# Patient Record
Sex: FEMALE | Race: WHITE | NOT HISPANIC OR LATINO | URBAN - NONMETROPOLITAN AREA
[De-identification: names, ages, dates, MRNs, and addresses within clinical notes are randomized per-mention and may not be internally consistent; named-entity substitution may affect disease eponyms.]

---

## 2017-01-11 RX ORDER — OMEPRAZOLE 20 MG/1
CAPSULE, DELAYED RELEASE ORAL
Qty: 90 CAP | Refills: 0 | Status: SHIPPED | OUTPATIENT
Start: 2017-01-11 | End: 2017-04-01 | Stop reason: SDUPTHER

## 2017-01-11 NOTE — TELEPHONE ENCOUNTER
Was the patient seen in the last year in this department? Yes     Does patient have an active prescription for medications requested? No     Received Request Via: Pharmacy

## 2017-01-18 RX ORDER — ERGOCALCIFEROL 1.25 MG/1
CAPSULE ORAL
Qty: 12 CAP | Refills: 0 | Status: SHIPPED | OUTPATIENT
Start: 2017-01-18 | End: 2017-04-12 | Stop reason: SDUPTHER

## 2017-02-13 ENCOUNTER — OFFICE VISIT (OUTPATIENT)
Dept: URGENT CARE | Facility: PHYSICIAN GROUP | Age: 57
End: 2017-02-13
Payer: COMMERCIAL

## 2017-02-13 VITALS
DIASTOLIC BLOOD PRESSURE: 88 MMHG | TEMPERATURE: 99.5 F | HEIGHT: 66 IN | RESPIRATION RATE: 16 BRPM | BODY MASS INDEX: 32.3 KG/M2 | OXYGEN SATURATION: 95 % | HEART RATE: 96 BPM | SYSTOLIC BLOOD PRESSURE: 142 MMHG | WEIGHT: 201 LBS

## 2017-02-13 DIAGNOSIS — M77.11 LATERAL EPICONDYLITIS OF RIGHT ELBOW: ICD-10-CM

## 2017-02-13 PROCEDURE — 99213 OFFICE O/P EST LOW 20 MIN: CPT | Performed by: NURSE PRACTITIONER

## 2017-02-13 RX ORDER — MELOXICAM 7.5 MG/1
7.5 TABLET ORAL DAILY
Qty: 30 TAB | Refills: 0 | Status: SHIPPED | OUTPATIENT
Start: 2017-02-13 | End: 2017-03-17 | Stop reason: SDUPTHER

## 2017-02-13 RX ORDER — HYDROCODONE BITARTRATE AND ACETAMINOPHEN 5; 325 MG/1; MG/1
1-2 TABLET ORAL EVERY 6 HOURS PRN
Qty: 20 TAB | Refills: 0 | Status: SHIPPED | OUTPATIENT
Start: 2017-02-13 | End: 2019-01-01

## 2017-02-13 ASSESSMENT — ENCOUNTER SYMPTOMS
WEAKNESS: 1
JOINT SWELLING: 1
MYALGIAS: 0
FALLS: 0
ARTHRALGIAS: 0
FEVER: 0

## 2017-02-13 NOTE — Clinical Note
February 13, 2017         Patient: Emily Torres   YOB: 1960   Date of Visit: 2/13/2017           To Whom it May Concern:    Emily Torres was seen in my clinic on 2/13/2017. Please excuse her from work for 2/13/17.        Sincerely,           CAITLYN Matta.  Electronically Signed

## 2017-02-13 NOTE — PROGRESS NOTES
Subjective:      Emily Torres is a 56 y.o. female who presents with Elbow Pain            Arm Injury  This is a new problem. Episode onset: 2 months ago. The problem occurs constantly. The problem has been unchanged. Associated symptoms include joint swelling (sujective) and weakness (right arm). Pertinent negatives include no arthralgias, fever or myalgias. The symptoms are aggravated by exertion (bending arm/ lifting objects). She has tried acetaminophen, NSAIDs and ice for the symptoms. The treatment provided no relief.       Review of Systems   Constitutional: Negative for fever and malaise/fatigue.   Musculoskeletal: Positive for joint swelling (sujective). Negative for myalgias, joint pain, falls and arthralgias.   Neurological: Positive for weakness (right arm).   All other systems reviewed and are negative.    PMH:  has a past medical history of Hypertension; GERD (gastroesophageal reflux disease); Dyslipidemia (5/15/2014); Hypothyroid (5/15/2014); Unspecified vitamin D deficiency (5/15/2014); Screening (3/17/2015); and Primary insomnia (8/24/2016).  MEDS:   Current outpatient prescriptions:   •  meloxicam (MOBIC) 7.5 MG Tab, Take 1 Tab by mouth every day., Disp: 30 Tab, Rfl: 0  •  hydrocodone-acetaminophen (NORCO) 5-325 MG Tab per tablet, Take 1-2 Tabs by mouth every 6 hours as needed., Disp: 20 Tab, Rfl: 0  •  vitamin D, Ergocalciferol, (DRISDOL) 08441 UNITS Cap capsule, TAKE 1 CAP BY MOUTH EVERY 7 DAYS., Disp: 12 Cap, Rfl: 0  •  omeprazole (PRILOSEC) 20 MG delayed-release capsule, TAKE 1 CAP BY MOUTH EVERY DAY., Disp: 90 Cap, Rfl: 0  •  lisinopril-hydrochlorothiazide (PRINZIDE, ZESTORETIC) 20-25 MG per tablet, TAKE 1 TAB BY MOUTH EVERY DAY., Disp: 90 Tab, Rfl: 3  •  vitamin D, Ergocalciferol, (DRISDOL) 00784 UNITS Cap capsule, TAKE 1 CAP BY MOUTH EVERY 7 DAYS., Disp: 12 Cap, Rfl: 0  •  zolpidem (AMBIEN) 5 MG Tab, Take 1 Tab by mouth at bedtime as needed for Sleep., Disp: 30 Tab, Rfl: 5  •   "ipratropium-albuterol (DUONEB) 0.5-2.5 (3) MG/3ML nebulizer solution, 3 mL by Nebulization route every 6 hours as needed for Shortness of Breath., Disp: 3 mL, Rfl: 3  •  fluticasone (FLOVENT HFA) 44 MCG/ACT AERO, Inhale 2 Puffs by mouth 2 times a day., Disp: 1 Inhaler, Rfl: 3  •  albuterol (VENTOLIN OR PROVENTIL) 108 (90 BASE) MCG/ACT AERS, Inhale 2 Puffs by mouth every 6 hours as needed for Shortness of Breath., Disp: 8.5 g, Rfl: 3  ALLERGIES:   Allergies   Allergen Reactions   • Shellfish Allergy      SURGHX:   Past Surgical History   Procedure Laterality Date   • Hemorrhoidectomy       SOCHX:  reports that she quit smoking about 5 years ago. Her smoking use included Cigarettes. She has a 5 pack-year smoking history. She has never used smokeless tobacco. She reports that she drinks about 0.5 oz of alcohol per week.  FH: family history includes Hypertension in her father and mother.       Objective:     /88 mmHg  Pulse 96  Temp(Src) 37.5 °C (99.5 °F)  Resp 16  Ht 1.676 m (5' 6\")  Wt 91.173 kg (201 lb)  BMI 32.46 kg/m2  SpO2 95%  Breastfeeding? No     Physical Exam   Constitutional: She is oriented to person, place, and time. Vital signs are normal. She appears well-developed and well-nourished.   HENT:   Head: Normocephalic.   Eyes: EOM are normal. Pupils are equal, round, and reactive to light.   Neck: Normal range of motion.   Cardiovascular: Normal rate and regular rhythm.    Pulmonary/Chest: Effort normal.   Musculoskeletal: She exhibits tenderness (lateral epicondyle right arm).        Right forearm: She exhibits tenderness. She exhibits no swelling, no edema and no deformity.        Arms:  Neurological: She is alert and oriented to person, place, and time. She has normal reflexes.   Skin: Skin is warm and dry.   Psychiatric: She has a normal mood and affect. Her behavior is normal. Thought content normal.   Vitals reviewed.              Assessment/Plan:     1. Lateral epicondylitis of right " elbow  - meloxicam (MOBIC) 7.5 MG Tab; Take 1 Tab by mouth every day.  Dispense: 30 Tab; Refill: 0  - hydrocodone-acetaminophen (NORCO) 5-325 MG Tab per tablet; Take 1-2 Tabs by mouth every 6 hours as needed.  Dispense: 20 Tab; Refill: 0  - RICE  - Force brace  - Physical therapy exercises  Follow up PRN if s/s do not improve or worsen

## 2017-02-13 NOTE — MR AVS SNAPSHOT
"        Emily Utter   2017 10:05 AM   Office Visit   MRN: [id number]    Department:  [facility]   Dept Phone:  [phone]    Description:  Female : 1960   Provider:  IRMA Matta           Reason for Visit     Elbow Pain irritated it X 2 months, getting worse, cannot lift any weight with that arm due to pain      Allergies as of 2017     Allergen Noted Reactions    Shellfish Allergy 05/15/2014         You were diagnosed with     Lateral epicondylitis of right elbow   [997074]         Vital Signs     Blood Pressure Pulse Temperature Respirations Height Weight    142/88 mmHg 96 37.5 °C (99.5 °F) 16 1.676 m (5' 6\") 91.173 kg (201 lb)    Body Mass Index Oxygen Saturation Breastfeeding? Smoking Status          32.46 kg/m2 95% No Former Smoker        Basic Information     Date Of Birth Sex Race Ethnicity Preferred Language    1960 Female White Non- English      Your appointments     Mar 10, 2017  8:00 AM   Adult Draw/Collection with [facility]   [facility] (--)    [address]  [place] [address]   [phone]            Mar 17, 2017 11:00 AM   Established Patient with Jeanine ALAN M.D.   [facility] (--)    [address]  [place] [id number] [phone]           You will be receiving a confirmation call a few days before your appointment from our automated call confirmation system.              Problem List              ICD-10-CM Priority Class Noted - Resolved    HTN (hypertension) I10 Medium Chronic 2013 - Present    GERD (gastroesophageal reflux disease) K21.9 Medium Chronic 2013 - Present    Dyslipidemia E78.5   5/15/2014 - Present    Hypothyroid E03.9   5/15/2014 - Present    Vitamin D deficiency E55.9   5/15/2014 - Present    Screening Z13.9   3/17/2015 - Present    Obesity E66.9   3/17/2015 - Present    Primary insomnia F51.01   2016 - Present    Mild intermittent asthma without complication " J45.20   8/24/2016 - Present      Health Maintenance        Date Due Completion Dates    IMM DTaP/Tdap/Td Vaccine (1 - Tdap) 10/21/1981 ---    IMM PNEUMOCOCCAL 19-64 (ADULT) MEDIUM RISK SERIES (1 of 1 - PPSV23) 10/21/1981 ---    COLONOSCOPY 10/21/2012 ---    PAP SMEAR 5/15/2013 5/15/2010 (Prv Comp)    Override on 5/15/2010: Previously completed    MAMMOGRAM 10/15/2015 10/15/2014    IMM INFLUENZA (1) 9/1/2016 ---            Current Immunizations     No immunizations on file.      Below and/or attached are the medications your provider expects you to take. Review all of your home medications and newly ordered medications with your provider and/or pharmacist. Follow medication instructions as directed by your provider and/or pharmacist. Please keep your medication list with you and share with your provider. Update the information when medications are discontinued, doses are changed, or new medications (including over-the-counter products) are added; and carry medication information at all times in the event of emergency situations     Allergies:  SHELLFISH ALLERGY - (reactions not documented)               Medications  Valid as of: February 13, 2017 - 11:48 AM    Generic Name Brand Name Tablet Size Instructions for use    Albuterol Sulfate (Aero Soln) albuterol 108 (90 BASE) MCG/ACT Inhale 2 Puffs by mouth every 6 hours as needed for Shortness of Breath.        Ergocalciferol (Cap) DRISDOL 97500 UNITS TAKE 1 CAP BY MOUTH EVERY 7 DAYS.        Ergocalciferol (Cap) DRISDOL 48379 UNITS TAKE 1 CAP BY MOUTH EVERY 7 DAYS.        Fluticasone Propionate HFA (Aerosol) FLOVENT HFA 44 MCG/ACT Inhale 2 Puffs by mouth 2 times a day.        Hydrocodone-Acetaminophen (Tab) NORCO 5-325 MG Take 1-2 Tabs by mouth every 6 hours as needed.        Ipratropium-Albuterol (Solution) DUONEB 0.5-2.5 (3) MG/3ML 3 mL by Nebulization route every 6 hours as needed for Shortness of Breath.        Lisinopril-Hydrochlorothiazide (Tab) PRINZIDE,  ZESTORETIC 20-25 MG TAKE 1 TAB BY MOUTH EVERY DAY.        Meloxicam (Tab) MOBIC 7.5 MG Take 1 Tab by mouth every day.        Omeprazole (CAPSULE DELAYED RELEASE) PRILOSEC 20 MG TAKE 1 CAP BY MOUTH EVERY DAY.        Zolpidem Tartrate (Tab) AMBIEN 5 MG Take 1 Tab by mouth at bedtime as needed for Sleep.        .                 Medicines prescribed today were sent to:     [id number] - [place], [place] - [address]    [address] [place] [address]    Phone: [phone] Fax: [phone]    Open 24 Hours?: No      Medication refill instructions:       If your prescription bottle indicates you have medication refills left, it is not necessary to call your provider’s office. Please contact your pharmacy and they will refill your medication.    If your prescription bottle indicates you do not have any refills left, you may request refills at any time through one of the following ways: The online [email or web address] system (except Urgent Care), by calling your provider’s office, or by asking your pharmacy to contact your provider’s office with a refill request. Medication refills are processed only during regular business hours and may not be available until the next business day. Your provider may request additional information or to have a follow-up visit with you prior to refilling your medication.   *Please Note: Medication refills are assigned a new Rx number when refilled electronically. Your pharmacy may indicate that no refills were authorized even though a new prescription for the same medication is available at the pharmacy. Please request the medicine by name with the pharmacy before contacting your provider for a refill.           [email or web address] Access Code: Activation code not generated  Current [email or web address] Status: Active

## 2017-03-10 ENCOUNTER — HOSPITAL ENCOUNTER (OUTPATIENT)
Dept: LAB | Facility: MEDICAL CENTER | Age: 57
End: 2017-03-10
Attending: INTERNAL MEDICINE
Payer: COMMERCIAL

## 2017-03-10 DIAGNOSIS — E03.9 HYPOTHYROID: ICD-10-CM

## 2017-03-10 DIAGNOSIS — I10 HTN (HYPERTENSION): ICD-10-CM

## 2017-03-10 DIAGNOSIS — E78.5 DYSLIPIDEMIA: ICD-10-CM

## 2017-03-10 DIAGNOSIS — E55.9 VITAMIN D DEFICIENCY: ICD-10-CM

## 2017-03-10 LAB
25(OH)D3 SERPL-MCNC: 52 NG/ML (ref 30–100)
ALBUMIN SERPL BCP-MCNC: 4.2 G/DL (ref 3.2–4.9)
ALBUMIN/GLOB SERPL: 1.4 G/DL
ALP SERPL-CCNC: 53 U/L (ref 30–99)
ALT SERPL-CCNC: 42 U/L (ref 2–50)
ANION GAP SERPL CALC-SCNC: 11 MMOL/L (ref 0–11.9)
AST SERPL-CCNC: 48 U/L (ref 12–45)
BASOPHILS # BLD AUTO: 0.05 K/UL (ref 0–0.12)
BASOPHILS NFR BLD AUTO: 1.4 % (ref 0–1.8)
BILIRUB SERPL-MCNC: 0.6 MG/DL (ref 0.1–1.5)
BUN SERPL-MCNC: 21 MG/DL (ref 8–22)
CALCIUM SERPL-MCNC: 9.9 MG/DL (ref 8.5–10.5)
CHLORIDE SERPL-SCNC: 103 MMOL/L (ref 96–112)
CHOLEST SERPL-MCNC: 234 MG/DL (ref 100–199)
CO2 SERPL-SCNC: 26 MMOL/L (ref 20–33)
CREAT SERPL-MCNC: 0.83 MG/DL (ref 0.5–1.4)
EOSINOPHIL # BLD: 0.03 K/UL (ref 0–0.51)
EOSINOPHIL NFR BLD AUTO: 0.8 % (ref 0–6.9)
ERYTHROCYTE [DISTWIDTH] IN BLOOD BY AUTOMATED COUNT: 42 FL (ref 35.9–50)
GLOBULIN SER CALC-MCNC: 3 G/DL (ref 1.9–3.5)
GLUCOSE SERPL-MCNC: 89 MG/DL (ref 65–99)
HCT VFR BLD AUTO: 40.3 % (ref 37–47)
HDLC SERPL-MCNC: 54 MG/DL
HGB BLD-MCNC: 13.1 G/DL (ref 12–16)
IMM GRANULOCYTES # BLD AUTO: 0.01 K/UL (ref 0–0.11)
IMM GRANULOCYTES NFR BLD AUTO: 0.3 % (ref 0–0.9)
LDLC SERPL CALC-MCNC: 108 MG/DL
LYMPHOCYTES # BLD: 1.53 K/UL (ref 1–4.8)
LYMPHOCYTES NFR BLD AUTO: 43.3 % (ref 22–41)
MCH RBC QN AUTO: 32.5 PG (ref 27–33)
MCHC RBC AUTO-ENTMCNC: 32.5 G/DL (ref 33.6–35)
MCV RBC AUTO: 100 FL (ref 81.4–97.8)
MONOCYTES # BLD: 0.26 K/UL (ref 0–0.85)
MONOCYTES NFR BLD AUTO: 7.4 % (ref 0–13.4)
NEUTROPHILS # BLD: 1.65 K/UL (ref 2–7.15)
NEUTROPHILS NFR BLD AUTO: 46.8 % (ref 44–72)
NRBC # BLD AUTO: 0 K/UL
NRBC BLD-RTO: 0 /100 WBC
PLATELET # BLD AUTO: 253 K/UL (ref 164–446)
PMV BLD AUTO: 9.2 FL (ref 9–12.9)
POTASSIUM SERPL-SCNC: 3.7 MMOL/L (ref 3.6–5.5)
PROT SERPL-MCNC: 7.2 G/DL (ref 6–8.2)
RBC # BLD AUTO: 4.03 M/UL (ref 4.2–5.4)
SODIUM SERPL-SCNC: 140 MMOL/L (ref 135–145)
T4 FREE SERPL-MCNC: 0.59 NG/DL (ref 0.53–1.43)
TRIGL SERPL-MCNC: 359 MG/DL (ref 0–149)
TSH SERPL DL<=0.005 MIU/L-ACNC: 4.08 UIU/ML (ref 0.3–3.7)
WBC # BLD AUTO: 3.5 K/UL (ref 4.8–10.8)

## 2017-03-10 PROCEDURE — 84439 ASSAY OF FREE THYROXINE: CPT

## 2017-03-10 PROCEDURE — 80061 LIPID PANEL: CPT

## 2017-03-10 PROCEDURE — 84443 ASSAY THYROID STIM HORMONE: CPT

## 2017-03-10 PROCEDURE — 85025 COMPLETE CBC W/AUTO DIFF WBC: CPT

## 2017-03-10 PROCEDURE — 80053 COMPREHEN METABOLIC PANEL: CPT

## 2017-03-10 PROCEDURE — 82306 VITAMIN D 25 HYDROXY: CPT

## 2017-03-10 PROCEDURE — 36415 COLL VENOUS BLD VENIPUNCTURE: CPT

## 2017-03-17 ENCOUNTER — OFFICE VISIT (OUTPATIENT)
Dept: MEDICAL GROUP | Facility: PHYSICIAN GROUP | Age: 57
End: 2017-03-17
Payer: COMMERCIAL

## 2017-03-17 VITALS
OXYGEN SATURATION: 97 % | RESPIRATION RATE: 16 BRPM | SYSTOLIC BLOOD PRESSURE: 144 MMHG | TEMPERATURE: 98.6 F | WEIGHT: 201 LBS | DIASTOLIC BLOOD PRESSURE: 100 MMHG | HEART RATE: 76 BPM | BODY MASS INDEX: 32.46 KG/M2

## 2017-03-17 DIAGNOSIS — E78.5 DYSLIPIDEMIA: ICD-10-CM

## 2017-03-17 DIAGNOSIS — E66.09 OBESITY DUE TO EXCESS CALORIES, UNSPECIFIED OBESITY SEVERITY: ICD-10-CM

## 2017-03-17 DIAGNOSIS — M77.11 LATERAL EPICONDYLITIS OF RIGHT ELBOW: ICD-10-CM

## 2017-03-17 DIAGNOSIS — I10 ESSENTIAL HYPERTENSION: ICD-10-CM

## 2017-03-17 DIAGNOSIS — E03.9 HYPOTHYROIDISM, UNSPECIFIED TYPE: ICD-10-CM

## 2017-03-17 PROCEDURE — 99214 OFFICE O/P EST MOD 30 MIN: CPT | Performed by: INTERNAL MEDICINE

## 2017-03-17 RX ORDER — MELOXICAM 7.5 MG/1
7.5 TABLET ORAL DAILY
Qty: 30 TAB | Refills: 3 | Status: SHIPPED | OUTPATIENT
Start: 2017-03-17 | End: 2019-01-01

## 2017-03-17 RX ORDER — LEVOTHYROXINE SODIUM 0.03 MG/1
25 TABLET ORAL
Qty: 60 TAB | Refills: 3 | Status: SHIPPED | OUTPATIENT
Start: 2017-03-17 | End: 2017-11-20 | Stop reason: SDUPTHER

## 2017-03-17 NOTE — MR AVS SNAPSHOT
Emily Torres   3/17/2017 11:00 AM   Office Visit   MRN: [id number]    Department:  [facility]   Dept Phone:  [phone]    Description:  Female : 1960   Provider:  Jeanine ALAN M.D.           Reason for Visit     Other fv labs      Allergies as of 3/17/2017     Allergen Noted Reactions    Shellfish Allergy 05/15/2014         You were diagnosed with     Essential hypertension   [6208773]       Lateral epicondylitis of right elbow   [168538]       Hypothyroidism, unspecified type   [6906614]       Dyslipidemia   [477607]       Obesity due to excess calories, unspecified obesity severity   [2669065]         Vital Signs     Blood Pressure Pulse Temperature Respirations Weight Oxygen Saturation    144/100 mmHg 76 37 °C (98.6 °F) 16 91.173 kg (201 lb) 97%    Smoking Status                   Former Smoker           Basic Information     Date Of Birth Sex Race Ethnicity Preferred Language    1960 Female White Non- English      Your appointments     2017  3:20 PM   Established Patient with Jeanine ALAN M.D.   [facility] (--)    [address] [id number] [phone]           You will be receiving a confirmation call a few days before your appointment from our automated call confirmation system.              Problem List              ICD-10-CM Priority Class Noted - Resolved    HTN (hypertension) I10 Medium Chronic 2013 - Present    GERD (gastroesophageal reflux disease) K21.9 Medium Chronic 2013 - Present    Dyslipidemia E78.5   5/15/2014 - Present    Hypothyroid E03.9   5/15/2014 - Present    Vitamin D deficiency E55.9   5/15/2014 - Present    Screening Z13.9   3/17/2015 - Present    Obesity E66.9   3/17/2015 - Present    Primary insomnia F51.01   2016 - Present    Mild intermittent asthma without complication J45.20   2016 - Present    Lateral epicondylitis of right elbow M77.11   3/17/2017 - Present         Health Maintenance        Date Due Completion Dates    IMM DTaP/Tdap/Td Vaccine (1 - Tdap) 10/21/1981 ---    IMM PNEUMOCOCCAL 19-64 (ADULT) MEDIUM RISK SERIES (1 of 1 - PPSV23) 10/21/1981 ---    COLONOSCOPY 10/21/2012 ---    PAP SMEAR 5/15/2013 5/15/2010 (Prv Comp)    Override on 5/15/2010: Previously completed    MAMMOGRAM 10/15/2015 10/15/2014    IMM INFLUENZA (1) 9/1/2016 ---            Current Immunizations     No immunizations on file.      Below and/or attached are the medications your provider expects you to take. Review all of your home medications and newly ordered medications with your provider and/or pharmacist. Follow medication instructions as directed by your provider and/or pharmacist. Please keep your medication list with you and share with your provider. Update the information when medications are discontinued, doses are changed, or new medications (including over-the-counter products) are added; and carry medication information at all times in the event of emergency situations     Allergies:  SHELLFISH ALLERGY - (reactions not documented)               Medications  Valid as of: March 17, 2017 - 11:37 AM    Generic Name Brand Name Tablet Size Instructions for use    Albuterol Sulfate (Aero Soln) albuterol 108 (90 BASE) MCG/ACT Inhale 2 Puffs by mouth every 6 hours as needed for Shortness of Breath.        Ergocalciferol (Cap) DRISDOL 04321 UNITS TAKE 1 CAP BY MOUTH EVERY 7 DAYS.        Ergocalciferol (Cap) DRISDOL 71810 UNITS TAKE 1 CAP BY MOUTH EVERY 7 DAYS.        Fluticasone Propionate HFA (Aerosol) FLOVENT HFA 44 MCG/ACT Inhale 2 Puffs by mouth 2 times a day.        Hydrocodone-Acetaminophen (Tab) NORCO 5-325 MG Take 1-2 Tabs by mouth every 6 hours as needed.        Ipratropium-Albuterol (Solution) DUONEB 0.5-2.5 (3) MG/3ML 3 mL by Nebulization route every 6 hours as needed for Shortness of Breath.        Levothyroxine Sodium (Tab) SYNTHROID 25 MCG Take 1 Tab by mouth Every morning on an empty  stomach.        Lisinopril-Hydrochlorothiazide (Tab) PRINZIDE, ZESTORETIC 20-25 MG TAKE 1 TAB BY MOUTH EVERY DAY.        Meloxicam (Tab) MOBIC 7.5 MG Take 1 Tab by mouth every day.        Omeprazole (CAPSULE DELAYED RELEASE) PRILOSEC 20 MG TAKE 1 CAP BY MOUTH EVERY DAY.        Zolpidem Tartrate (Tab) AMBIEN 5 MG Take 1 Tab by mouth at bedtime as needed for Sleep.        .                 Medicines prescribed today were sent to:     [facility] #[id number] - [place] - [address]    [address] [place] [address]    Phone: [phone] Fax: [phone]    Open 24 Hours?: No      Medication refill instructions:       If your prescription bottle indicates you have medication refills left, it is not necessary to call your provider’s office. Please contact your pharmacy and they will refill your medication.    If your prescription bottle indicates you do not have any refills left, you may request refills at any time through one of the following ways: The online [employer] system (except Urgent Care), by calling your provider’s office, or by asking your pharmacy to contact your provider’s office with a refill request. Medication refills are processed only during regular business hours and may not be available until the next business day. Your provider may request additional information or to have a follow-up visit with you prior to refilling your medication.   *Please Note: Medication refills are assigned a new Rx number when refilled electronically. Your pharmacy may indicate that no refills were authorized even though a new prescription for the same medication is available at the pharmacy. Please request the medicine by name with the pharmacy before contacting your provider for a refill.        Your To Do List     Future Labs/Procedures Complete By Expires    TSH  As directed 3/17/2018      Instructions      Goal bp at 135/85 most of the time.            [employer] Access Code: Activation code not generated  Current  MyChart Status: Active

## 2017-03-17 NOTE — ASSESSMENT & PLAN NOTE
Patient reports that her elbow is a bit better she has just completed her course of meloxicam and it seems to have helped. She is being very careful about how she uses her arm.

## 2017-03-17 NOTE — PROGRESS NOTES
Chief Complaint   Patient presents with   • Other     fv labs       HISTORY OF PRESENT ILLNESS: Patient is a 56 y.o. female established patient who presents today to discuss the medical issues below.    HTN (hypertension)  Home readings at the pharmacy usually in the 130-130/70 range.  No chest pain palpitations edema    Lateral epicondylitis of right elbow  Patient reports that her elbow is a bit better she has just completed her course of meloxicam and it seems to have helped. She is being very careful about how she uses her arm. No numbness or weakness pain at the lateral elbow worse with supination.    Hypothyroid  Patient is not on replacement and never has been. She did have lab work done. She is starting a new diet today otherwise weight has been pretty steady. No constipation or diarrhea. No hair or skin changes    Dyslipidemia  Patient working on diet, she has lowered the cholesterol weight is status quo.      Obesity  Working on diet, new weight watchers program came today.        Patient Active Problem List    Diagnosis Date Noted   • HTN (hypertension) 06/20/2013     Priority: Medium     Class: Chronic   • GERD (gastroesophageal reflux disease) 06/20/2013     Priority: Medium     Class: Chronic   • Lateral epicondylitis of right elbow 03/17/2017   • Primary insomnia 08/24/2016   • Mild intermittent asthma without complication 08/24/2016   • Screening 03/17/2015   • Obesity 03/17/2015   • Dyslipidemia 05/15/2014   • Hypothyroid 05/15/2014   • Vitamin D deficiency 05/15/2014       Allergies:Shellfish allergy    Current Outpatient Prescriptions   Medication Sig Dispense Refill   • levothyroxine (SYNTHROID) 25 MCG Tab Take 1 Tab by mouth Every morning on an empty stomach. 60 Tab 3   • meloxicam (MOBIC) 7.5 MG Tab Take 1 Tab by mouth every day. 30 Tab 3   • vitamin D, Ergocalciferol, (DRISDOL) 20726 UNITS Cap capsule TAKE 1 CAP BY MOUTH EVERY 7 DAYS. 12 Cap 0   • omeprazole (PRILOSEC) 20 MG delayed-release  capsule TAKE 1 CAP BY MOUTH EVERY DAY. 90 Cap 0   • lisinopril-hydrochlorothiazide (PRINZIDE, ZESTORETIC) 20-25 MG per tablet TAKE 1 TAB BY MOUTH EVERY DAY. 90 Tab 3   • albuterol (VENTOLIN OR PROVENTIL) 108 (90 BASE) MCG/ACT AERS Inhale 2 Puffs by mouth every 6 hours as needed for Shortness of Breath. 8.5 g 3   • hydrocodone-acetaminophen (NORCO) 5-325 MG Tab per tablet Take 1-2 Tabs by mouth every 6 hours as needed. 20 Tab 0   • vitamin D, Ergocalciferol, (DRISDOL) 78809 UNITS Cap capsule TAKE 1 CAP BY MOUTH EVERY 7 DAYS. 12 Cap 0   • zolpidem (AMBIEN) 5 MG Tab Take 1 Tab by mouth at bedtime as needed for Sleep. 30 Tab 5   • ipratropium-albuterol (DUONEB) 0.5-2.5 (3) MG/3ML nebulizer solution 3 mL by Nebulization route every 6 hours as needed for Shortness of Breath. 3 mL 3   • fluticasone (FLOVENT HFA) 44 MCG/ACT AERO Inhale 2 Puffs by mouth 2 times a day. 1 Inhaler 3     No current facility-administered medications for this visit.         Past Medical History   Diagnosis Date   • Hypertension    • GERD (gastroesophageal reflux disease)    • Dyslipidemia 5/15/2014     Recently started working out with  - 3 weeks ago ,  Will try lifestyle x 3-4 months    • Hypothyroid 5/15/2014     TSH 3.82 - repeat in Sept   • Unspecified vitamin D deficiency 5/15/2014     5 uncorrected. Start 50,000IU daily.    • Screening 3/17/2015   • Primary insomnia 8/24/2016   • Lateral epicondylitis of right elbow 3/17/2017       Social History   Substance Use Topics   • Smoking status: Former Smoker -- 1.00 packs/day for 5 years     Types: Cigarettes     Quit date: 05/15/2011   • Smokeless tobacco: Never Used   • Alcohol Use: 0.5 oz/week     1 Glasses of wine per week      Comment: twice weekly       Family Status   Relation Status Death Age   • Mother Alive    • Father Alive    • Sister Alive      Family History   Problem Relation Age of Onset   • Hypertension Mother    • Hypertension Father        ROS:     Respiratory: Negative for cough, sputum production, shortness of breath or wheezing.    Cardiovascular: Negative for chest pain, palpitations, orthopnea, dyspnea with exertion or edema.   Gastrointestinal: Negative for GI upset, nausea, vomiting, abdominal pain, constipation or diarrhea.   Genitourinary: Negative for dysuria, urgency, hesitancy or frequency.       Exam:    Blood pressure 144/100, pulse 76, temperature 37 °C (98.6 °F), resp. rate 16, weight 91.173 kg (201 lb), SpO2 97 %.  General:  Well nourished, well developed female in NAD.  HENT: Normocephalic, bilateral TMs are intact, nasal and oral mucosa with no lesions,   Neck: Supple without bruit. Thyroid is not enlarged.  Pulmonary: Clear to ausculation and percussion.  Normal effort. No rales, rhonchi, or wheezing.  Cardiovascular: Regular rate and rhythm without murmur.   Abdomen: Normal bowel sounds soft and nontender no palpable liver spleen bladder mass.  Extremities: No LE edema noted.  Neuro: Grossly nonfocal.  Psych: Alert and oriented to person, place, and time. Appropriate mood and conversation.    LABS:  Results reviewed and discussed with the patient, questions answered.      This dictation was created using voice recognition software. I have made reasonable attempts to correct errors, however, errors of grammar and content may exist.          Assessment/Plan:    1. Essential hypertension  Borderline discussed home monitoring. She has had good control generally has been doing a lot of chores this morning. We discussed goal, consider medication adjustment if persisting. Monitor with weight loss program. No secondary symptoms.    2. Lateral epicondylitis of right elbow  Refill on the meloxicam. She is utilizing a brace gradually improving, discussed consideration for injection if persisting.  - meloxicam (MOBIC) 7.5 MG Tab; Take 1 Tab by mouth every day.  Dispense: 30 Tab; Refill: 3    3. Hypothyroidism, unspecified type  Borderline TSH  discussed options start low-dose thyroid replacement monitor  - TSH; Future    4. Dyslipidemia  Monitor with adjust to normalize the TSH and diet exercise weight loss program    5. Obesity due to excess calories, unspecified obesity severity  Reviewed weight loss diet programs, behavior modification. Support    Patient was seen for 30 minutes face to face of which more than 50% of the time was spent in counseling and coordination of care regarding the above problems.

## 2017-03-17 NOTE — ASSESSMENT & PLAN NOTE
Patient is not on replacement and never has been she did have lab work done. She is starting a new diet today otherwise weight has been pretty steady. No constipation or diarrhea.

## 2017-04-02 RX ORDER — OMEPRAZOLE 20 MG/1
CAPSULE, DELAYED RELEASE ORAL
Qty: 90 CAP | Refills: 0 | Status: SHIPPED | OUTPATIENT
Start: 2017-04-02 | End: 2017-05-01 | Stop reason: SDUPTHER

## 2017-04-13 RX ORDER — ERGOCALCIFEROL 1.25 MG/1
CAPSULE ORAL
Qty: 12 CAP | Refills: 1 | Status: SHIPPED | OUTPATIENT
Start: 2017-04-13 | End: 2017-10-22 | Stop reason: SDUPTHER

## 2017-08-03 ENCOUNTER — OFFICE VISIT (OUTPATIENT)
Dept: MEDICAL GROUP | Facility: PHYSICIAN GROUP | Age: 57
End: 2017-08-03
Payer: COMMERCIAL

## 2017-08-03 VITALS
DIASTOLIC BLOOD PRESSURE: 76 MMHG | BODY MASS INDEX: 31.82 KG/M2 | OXYGEN SATURATION: 97 % | HEART RATE: 93 BPM | SYSTOLIC BLOOD PRESSURE: 110 MMHG | WEIGHT: 198 LBS | HEIGHT: 66 IN | TEMPERATURE: 98.1 F | RESPIRATION RATE: 16 BRPM

## 2017-08-03 DIAGNOSIS — R79.89 ELEVATED TSH: ICD-10-CM

## 2017-08-03 DIAGNOSIS — B07.0 PLANTAR WART: ICD-10-CM

## 2017-08-03 DIAGNOSIS — E66.09 OBESITY DUE TO EXCESS CALORIES, UNSPECIFIED OBESITY SEVERITY: ICD-10-CM

## 2017-08-03 PROCEDURE — 17110 DESTRUCTION B9 LES UP TO 14: CPT | Performed by: INTERNAL MEDICINE

## 2017-08-03 PROCEDURE — 99212 OFFICE O/P EST SF 10 MIN: CPT | Mod: 25 | Performed by: INTERNAL MEDICINE

## 2017-08-03 ASSESSMENT — PATIENT HEALTH QUESTIONNAIRE - PHQ9: CLINICAL INTERPRETATION OF PHQ2 SCORE: 0

## 2017-08-03 ASSESSMENT — PAIN SCALES - GENERAL: PAINLEVEL: 9=SEVERE PAIN

## 2017-08-03 NOTE — MR AVS SNAPSHOT
"        Emily Utter   8/3/2017 4:00 PM   Office Visit   MRN: [id number]    Department:  [facility]   Dept Phone:  [phone]    Description:  Female : 1960   Provider:  Jeanine ALAN M.D.           Reason for Visit     Warts wart on right foot painful/hard x2 months     Immunizations TDAP     Other Mammo/FIT test       Allergies as of 8/3/2017     Allergen Noted Reactions    Shellfish Allergy 05/15/2014         You were diagnosed with     Obesity due to excess calories, unspecified obesity severity   [6214731]       Plantar wart   [078.12.ICD-9-CM]       Elevated TSH   [276633]         Vital Signs     Blood Pressure Pulse Temperature Respirations Height Weight    110/76 mmHg 93 36.7 °C (98.1 °F) 16 1.676 m (5' 6\") 89.812 kg (198 lb)    Body Mass Index Oxygen Saturation Smoking Status             31.97 kg/m2 97% Former Smoker         Basic Information     Date Of Birth Sex Race Ethnicity Preferred Language    1960 Female White Non- English      Your appointments     Oct 06, 2017  7:30 AM   Adult Draw/Collection with [facility]   [facility] (--)    [address] [facility] [address]   [phone]            Oct 11, 2017  1:30 PM   Established Patient with Jeanine ALAN M.D.   [facility] (--)    [address] [employer] [place] [address] [phone]           You will be receiving a confirmation call a few days before your appointment from our automated call confirmation system.              Problem List              ICD-10-CM Priority Class Noted - Resolved    HTN (hypertension) I10 Medium Chronic 2013 - Present    GERD (gastroesophageal reflux disease) K21.9 Medium Chronic 2013 - Present    Dyslipidemia E78.5   5/15/2014 - Present    Hypothyroid E03.9   5/15/2014 - Present    Vitamin D deficiency E55.9   5/15/2014 - Present    Screening [id number]   3/17/2015 - Present    Obesity E66.9   3/17/2015 - Present    Primary insomnia F51.01   " 8/24/2016 - Present    Mild intermittent asthma without complication J45.20   8/24/2016 - Present    Lateral epicondylitis of right elbow M77.11   3/17/2017 - Present    Plantar wart B07.0   8/3/2017 - Present    Elevated TSH R94.6   8/3/2017 - Present      Health Maintenance        Date Due Completion Dates    IMM DTaP/Tdap/Td Vaccine (1 - Tdap) 10/21/1981 ---    COLONOSCOPY 10/21/2012 ---    PAP SMEAR 5/15/2013 5/15/2010 (Prv Comp)    Override on 5/15/2010: Previously completed    MAMMOGRAM 10/15/2015 10/15/2014    IMM INFLUENZA (1) 9/1/2017 ---            Current Immunizations     Pneumococcal polysaccharide vaccine (PPSV-23) 11/10/2011      Below and/or attached are the medications your provider expects you to take. Review all of your home medications and newly ordered medications with your provider and/or pharmacist. Follow medication instructions as directed by your provider and/or pharmacist. Please keep your medication list with you and share with your provider. Update the information when medications are discontinued, doses are changed, or new medications (including over-the-counter products) are added; and carry medication information at all times in the event of emergency situations     Allergies:  SHELLFISH ALLERGY - (reactions not documented)               Medications  Valid as of: August 03, 2017 -  4:43 PM    Generic Name Brand Name Tablet Size Instructions for use    Albuterol Sulfate (Aero Soln) albuterol 108 (90 BASE) MCG/ACT Inhale 2 Puffs by mouth every 6 hours as needed for Shortness of Breath.        Ergocalciferol (Cap) DRISDOL 99774 UNITS TAKE 1 CAP BY MOUTH EVERY 7 DAYS.        Ergocalciferol (Cap) DRISDOL 18591 UNITS TAKE 1 CAP BY MOUTH EVERY 7 DAYS.        Fluticasone Propionate HFA (Aerosol) FLOVENT HFA 44 MCG/ACT Inhale 2 Puffs by mouth 2 times a day.        Hydrocodone-Acetaminophen (Tab) NORCO 5-325 MG Take 1-2 Tabs by mouth every 6 hours as needed.        Ipratropium-Albuterol  (Solution) DUONEB 0.5-2.5 (3) MG/3ML 3 mL by Nebulization route every 6 hours as needed for Shortness of Breath.        Levothyroxine Sodium (Tab) SYNTHROID 25 MCG Take 1 Tab by mouth Every morning on an empty stomach.        Lisinopril-Hydrochlorothiazide (Tab) PRINZIDE, ZESTORETIC 20-25 MG TAKE 1 TAB BY MOUTH EVERY DAY.        Meloxicam (Tab) MOBIC 7.5 MG Take 1 Tab by mouth every day.        Omeprazole (CAPSULE DELAYED RELEASE) PRILOSEC 20 MG TAKE 1 CAP BY MOUTH EVERY DAY.        Zolpidem Tartrate (Tab) AMBIEN 5 MG Take 1 Tab by mouth at bedtime as needed for Sleep.        .                 Medicines prescribed today were sent to:     [facility][id number] - [place] - [address]    [address]  [place] [address]    Phone: [phone] Fax: [phone]    Open 24 Hours?: No      Medication refill instructions:       If your prescription bottle indicates you have medication refills left, it is not necessary to call your provider’s office. Please contact your pharmacy and they will refill your medication.    If your prescription bottle indicates you do not have any refills left, you may request refills at any time through one of the following ways: The online [employer] system (except Urgent Care), by calling your provider’s office, or by asking your pharmacy to contact your provider’s office with a refill request. Medication refills are processed only during regular business hours and may not be available until the next business day. Your provider may request additional information or to have a follow-up visit with you prior to refilling your medication.   *Please Note: Medication refills are assigned a new Rx number when refilled electronically. Your pharmacy may indicate that no refills were authorized even though a new prescription for the same medication is available at the pharmacy. Please request the medicine by name with the pharmacy before contacting your provider for a refill.           [employer]  Access Code: Activation code not generated  Current MyChart Status: Active

## 2017-08-03 NOTE — PROGRESS NOTES
Chief Complaint   Patient presents with   • Warts     wart on right foot painful/hard x2 months    • Immunizations     TDAP    • Other     Mammo/FIT test        HISTORY OF PRESENT ILLNESS: Patient is a 56 y.o. female established patient who presents today to discuss below issues      Obesity  Patient states she is working on weight loss, she states she had a big lunch.      Plantar wart  Patient reports she noticed a sore spot on the bottom of her right foot times about 2 months now this is just gradually increasing in tenderness. She did trip on the stairs about a week ago sprained her ankle and it was more awkward to be able to not walk on that spot.    Hypothyroid  Patient did have borderline TSH in the past has been taking 25 µg of thyroid feels a bit better. Has not had lab work done.    Patient Active Problem List    Diagnosis Date Noted   • HTN (hypertension) 06/20/2013     Priority: Medium     Class: Chronic   • GERD (gastroesophageal reflux disease) 06/20/2013     Priority: Medium     Class: Chronic   • Plantar wart 08/03/2017   • Lateral epicondylitis of right elbow 03/17/2017   • Primary insomnia 08/24/2016   • Mild intermittent asthma without complication 08/24/2016   • Screening 03/17/2015   • Obesity 03/17/2015   • Dyslipidemia 05/15/2014   • Hypothyroid 05/15/2014   • Vitamin D deficiency 05/15/2014      Allergies:Shellfish allergy    Current Outpatient Prescriptions   Medication Sig Dispense Refill   • omeprazole (PRILOSEC) 20 MG delayed-release capsule TAKE 1 CAP BY MOUTH EVERY DAY. 90 Cap 3   • levothyroxine (SYNTHROID) 25 MCG Tab Take 1 Tab by mouth Every morning on an empty stomach. 60 Tab 3   • vitamin D, Ergocalciferol, (DRISDOL) 24625 UNITS Cap capsule TAKE 1 CAP BY MOUTH EVERY 7 DAYS. 12 Cap 0   • lisinopril-hydrochlorothiazide (PRINZIDE, ZESTORETIC) 20-25 MG per tablet TAKE 1 TAB BY MOUTH EVERY DAY. 90 Tab 3   • fluticasone (FLOVENT HFA) 44 MCG/ACT AERO Inhale 2 Puffs by mouth 2 times a day.  "1 Inhaler 3   • albuterol (VENTOLIN OR PROVENTIL) 108 (90 BASE) MCG/ACT AERS Inhale 2 Puffs by mouth every 6 hours as needed for Shortness of Breath. 8.5 g 3   • vitamin D, Ergocalciferol, (DRISDOL) 27791 UNITS Cap capsule TAKE 1 CAP BY MOUTH EVERY 7 DAYS. 12 Cap 1   • meloxicam (MOBIC) 7.5 MG Tab Take 1 Tab by mouth every day. (Patient not taking: Reported on 8/3/2017) 30 Tab 3   • hydrocodone-acetaminophen (NORCO) 5-325 MG Tab per tablet Take 1-2 Tabs by mouth every 6 hours as needed. (Patient not taking: Reported on 8/3/2017) 20 Tab 0   • zolpidem (AMBIEN) 5 MG Tab Take 1 Tab by mouth at bedtime as needed for Sleep. 30 Tab 5   • ipratropium-albuterol (DUONEB) 0.5-2.5 (3) MG/3ML nebulizer solution 3 mL by Nebulization route every 6 hours as needed for Shortness of Breath. 3 mL 3     No current facility-administered medications for this visit.       Social History   Substance Use Topics   • Smoking status: Former Smoker -- 1.00 packs/day for 5 years     Types: Cigarettes     Quit date: 05/15/2011   • Smokeless tobacco: Never Used   • Alcohol Use: 0.5 oz/week     1 Glasses of wine per week      Comment: twice weekly     Social History     Social History Narrative    TCID,        Family History   Problem Relation Age of Onset   • Hypertension Mother    • Hypertension Father        ROS:  Per HPI    Exam:  Blood pressure 110/76, pulse 93, temperature 36.7 °C (98.1 °F), resp. rate 16, height 1.676 m (5' 6\"), weight 89.812 kg (198 lb), SpO2 97 %. in no apparent distress  Skin: no rashes in visible areas, warm, dry  Cardiovascular: Regular rate and rhythm without murmur.   Pulmonary: Clear to ausculation. No rales, ronchi, or wheezing.  Abdomen: soft non-tender, no palpable liver, spleen, bladder or masses.  Extremities: no clubbing, cyanosis, or edema. The plantar surface proximal to the fifth digit plantar wart no edema erythema   Psych: alert and oriented x 3, judgement and memory " intact        Assessment/Plan:  1. Obesity due to excess calories, unspecified obesity severity  Reviewed diet and exercise weight loss    2. Plantar wart  Area prepped with alcohol utilizing curettes the wart is curetted liquid nitrogen applied recheck in 6 weeks    3. Elevated TSH   discussed laboratory assessment prior to follow-up schedule.    Patient was seen for  10 minutes face to face of which more than 50% of the time was spent in counseling and coordination of care regarding the above problems.

## 2017-08-03 NOTE — ASSESSMENT & PLAN NOTE
Patient reports she noticed a sore spot on the bottom of her right foot times about 2 months now this is just gradually increasing in tenderness. She did trip on the stairs about a week ago sprained her ankle and it was more awkward to be able to not walk on that spot.

## 2017-10-23 RX ORDER — ERGOCALCIFEROL 1.25 MG/1
CAPSULE ORAL
Qty: 12 CAP | Refills: 1 | Status: SHIPPED | OUTPATIENT
Start: 2017-10-23 | End: 2018-03-26 | Stop reason: SDUPTHER

## 2017-12-11 ENCOUNTER — OFFICE VISIT (OUTPATIENT)
Dept: URGENT CARE | Facility: PHYSICIAN GROUP | Age: 57
End: 2017-12-11
Payer: COMMERCIAL

## 2017-12-11 VITALS
BODY MASS INDEX: 32.09 KG/M2 | SYSTOLIC BLOOD PRESSURE: 132 MMHG | RESPIRATION RATE: 20 BRPM | OXYGEN SATURATION: 95 % | DIASTOLIC BLOOD PRESSURE: 88 MMHG | TEMPERATURE: 99 F | HEART RATE: 85 BPM | HEIGHT: 66 IN | WEIGHT: 199.7 LBS

## 2017-12-11 DIAGNOSIS — Z48.02 ENCOUNTER FOR REMOVAL OF SUTURES: ICD-10-CM

## 2017-12-11 PROCEDURE — 99213 OFFICE O/P EST LOW 20 MIN: CPT | Performed by: NURSE PRACTITIONER

## 2017-12-11 ASSESSMENT — ENCOUNTER SYMPTOMS
VOMITING: 0
SHORTNESS OF BREATH: 0
NAUSEA: 0
MYALGIAS: 0
CHILLS: 0
EYE PAIN: 0
DIZZINESS: 0
SORE THROAT: 0
FEVER: 0

## 2017-12-11 NOTE — PROGRESS NOTES
Subjective:     Emily Torres is a 57 y.o. female who presents for Suture / Staple Removal (removal of stitches fro right forearm)       Suture / Staple Removal   The sutures were placed 11 to 14 days ago. She tried regular soap and water washings since the wound repair. The treatment provided significant relief. Her temperature was unmeasured prior to arrival. There has been no drainage from the wound. There is no redness present. There is no swelling present. There is no pain present. She has no difficulty moving the affected extremity or digit.     Past Medical History:   Diagnosis Date   • Dyslipidemia 5/15/2014    Recently started working out with  - 3 weeks ago ,  Will try lifestyle x 3-4 months    • GERD (gastroesophageal reflux disease)    • Hypertension    • Hypothyroid 5/15/2014    TSH 3.82 - repeat in Sept   • Lateral epicondylitis of right elbow 3/17/2017   • Primary insomnia 8/24/2016   • Screening 3/17/2015   • Unspecified vitamin D deficiency 5/15/2014    5 uncorrected. Start 50,000IU daily.      Past Surgical History:   Procedure Laterality Date   • HEMORRHOIDECTOMY       Social History     Social History   • Marital status:      Spouse name: N/A   • Number of children: N/A   • Years of education: N/A     Occupational History   • Not on file.     Social History Main Topics   • Smoking status: Former Smoker     Packs/day: 1.00     Years: 5.00     Types: Cigarettes     Quit date: 5/15/2011   • Smokeless tobacco: Never Used   • Alcohol use 0.5 oz/week     1 Glasses of wine per week      Comment: twice weekly   • Drug use: No   • Sexual activity: Yes     Partners: Male      Comment:      Other Topics Concern   • Not on file     Social History Narrative    TCID,       Family History   Problem Relation Age of Onset   • Hypertension Mother    • Hypertension Father     Review of Systems   Constitutional: Negative for chills and fever.   HENT: Negative for sore throat.  "   Eyes: Negative for pain.   Respiratory: Negative for shortness of breath.    Cardiovascular: Negative for chest pain.   Gastrointestinal: Negative for nausea and vomiting.   Genitourinary: Negative for hematuria.   Musculoskeletal: Negative for myalgias.   Skin: Negative for rash.   Neurological: Negative for dizziness.     Allergies   Allergen Reactions   • Shellfish Allergy       Objective:   /88   Pulse 85   Temp 37.2 °C (99 °F)   Resp 20   Ht 1.676 m (5' 6\")   Wt 90.6 kg (199 lb 11.2 oz)   SpO2 95%   BMI 32.23 kg/m²   Physical Exam   Constitutional: She is oriented to person, place, and time. She appears well-developed and well-nourished. No distress.   HENT:   Head: Normocephalic and atraumatic.   Eyes: Conjunctivae and EOM are normal. Pupils are equal, round, and reactive to light.   Cardiovascular: Normal rate and regular rhythm.    No murmur heard.  Pulmonary/Chest: Effort normal and breath sounds normal. No respiratory distress.   Abdominal: Soft. She exhibits no distension. There is no tenderness.   Neurological: She is alert and oriented to person, place, and time. She has normal reflexes. No sensory deficit.   Skin: Skin is warm and dry. Laceration noted. No erythema.        4 intact sutures removed. Not signs of infection, no erythema, no drainage, wound well approximated and healed.    Psychiatric: She has a normal mood and affect.         Assessment/Plan:   Assessment    1. Encounter for removal of sutures    4 sutures are removed without complication. Patient advised to keep sunscreen and vitamin E oil on scab to help healing process.    Patient given precautionary s/sx that mandate immediate follow up and evaluation in the ED. Advised of risks of not doing so.    DDX, Supportive care, and indications for immediate follow-up discussed with patient.    Instructed to return to clinic or nearest emergency department if we are not available for any change in condition, further concerns, " or worsening of symptoms.    The patient demonstrated a good understanding and agreed with the treatment plan.

## 2018-01-01 ENCOUNTER — OFFICE VISIT (OUTPATIENT)
Dept: URGENT CARE | Facility: PHYSICIAN GROUP | Age: 58
End: 2018-01-01
Payer: COMMERCIAL

## 2018-01-01 VITALS
HEIGHT: 66 IN | DIASTOLIC BLOOD PRESSURE: 94 MMHG | OXYGEN SATURATION: 96 % | SYSTOLIC BLOOD PRESSURE: 136 MMHG | WEIGHT: 193 LBS | BODY MASS INDEX: 31.02 KG/M2 | HEART RATE: 84 BPM | TEMPERATURE: 98.9 F | RESPIRATION RATE: 16 BRPM

## 2018-01-01 DIAGNOSIS — E03.9 HYPOTHYROIDISM, UNSPECIFIED TYPE: ICD-10-CM

## 2018-01-01 DIAGNOSIS — I10 ESSENTIAL HYPERTENSION: ICD-10-CM

## 2018-01-01 DIAGNOSIS — E55.9 VITAMIN D DEFICIENCY: ICD-10-CM

## 2018-01-01 DIAGNOSIS — E78.5 DYSLIPIDEMIA: ICD-10-CM

## 2018-01-01 DIAGNOSIS — J01.90 SUBACUTE SINUSITIS, UNSPECIFIED LOCATION: ICD-10-CM

## 2018-01-01 DIAGNOSIS — E66.9 OBESITY (BMI 30-39.9): ICD-10-CM

## 2018-01-01 PROCEDURE — 99214 OFFICE O/P EST MOD 30 MIN: CPT | Performed by: PHYSICIAN ASSISTANT

## 2018-01-01 RX ORDER — AMOXICILLIN AND CLAVULANATE POTASSIUM 875; 125 MG/1; MG/1
1 TABLET, FILM COATED ORAL 2 TIMES DAILY
Qty: 14 TAB | Refills: 0 | Status: SHIPPED | OUTPATIENT
Start: 2018-01-01 | End: 2018-01-01

## 2018-01-01 RX ORDER — LEVOTHYROXINE SODIUM 0.03 MG/1
25 TABLET ORAL
Qty: 90 TAB | Refills: 0 | Status: SHIPPED | OUTPATIENT
Start: 2018-01-01 | End: 2019-01-01 | Stop reason: SDUPTHER

## 2018-01-10 ENCOUNTER — TELEPHONE (OUTPATIENT)
Dept: MEDICAL GROUP | Facility: PHYSICIAN GROUP | Age: 58
End: 2018-01-10

## 2018-01-10 DIAGNOSIS — Z12.11 COLON CANCER SCREENING: ICD-10-CM

## 2018-01-10 NOTE — TELEPHONE ENCOUNTER
Patient stated that she has one in 2009 with Dr. Santoro (will request records) and she got a news letter stating that she is due for a colonoscopy in the mail.

## 2018-01-10 NOTE — TELEPHONE ENCOUNTER
Please check and see why she needs another colonoscopy, did she have abnormality on the first one? (any history of colon polyps?) usually we do normal colonoscopies in 10 years so we need a reason to refer.  I can place a referral if we know why.    We need a copy of the colonoscopy as well so can she tell us where the last one was done and try to get a copy.

## 2018-01-10 NOTE — TELEPHONE ENCOUNTER
1. Caller Name: Emily                      Call Back Number: [phone]    2. Message: pt called and stated that she is due for a colonoscopy and she would like an order so that she can go and get it done.  Please advise     3. Patient approves office to leave a detailed voicemail/MyChart message: N\A

## 2018-01-24 ENCOUNTER — TELEPHONE (OUTPATIENT)
Dept: MEDICAL GROUP | Facility: PHYSICIAN GROUP | Age: 58
End: 2018-01-24

## 2018-01-24 DIAGNOSIS — Z12.11 COLON CANCER SCREENING: ICD-10-CM

## 2018-01-24 DIAGNOSIS — K21.9 GASTROESOPHAGEAL REFLUX DISEASE, ESOPHAGITIS PRESENCE NOT SPECIFIED: ICD-10-CM

## 2018-01-24 NOTE — TELEPHONE ENCOUNTER
1. Caller Name: Emily                      Call Back Number: [phone]     2. Message: Emily said that a colonoscopy is going to be too expensive and wanted to know if she can do the FIT test instead?  Please advise    3. Patient approves office to leave a detailed voicemail/MyChart message: N\A

## 2018-01-24 NOTE — TELEPHONE ENCOUNTER
She can use the FIT as this is better than nothing however, she needs to understand this is suboptimal screening and I recommend the colonoscopy.

## 2018-02-16 ENCOUNTER — OFFICE VISIT (OUTPATIENT)
Dept: URGENT CARE | Facility: PHYSICIAN GROUP | Age: 58
End: 2018-02-16
Payer: COMMERCIAL

## 2018-02-16 VITALS
HEART RATE: 90 BPM | TEMPERATURE: 97.3 F | RESPIRATION RATE: 16 BRPM | SYSTOLIC BLOOD PRESSURE: 126 MMHG | WEIGHT: 190 LBS | DIASTOLIC BLOOD PRESSURE: 72 MMHG | OXYGEN SATURATION: 97 % | HEIGHT: 66 IN | BODY MASS INDEX: 30.53 KG/M2

## 2018-02-16 DIAGNOSIS — J98.8 WHEEZING-ASSOCIATED RESPIRATORY INFECTION (WARI): ICD-10-CM

## 2018-02-16 PROCEDURE — 99214 OFFICE O/P EST MOD 30 MIN: CPT | Performed by: PHYSICIAN ASSISTANT

## 2018-02-16 RX ORDER — DOXYCYCLINE HYCLATE 100 MG
100 TABLET ORAL 2 TIMES DAILY
Qty: 20 TAB | Refills: 0 | Status: SHIPPED | OUTPATIENT
Start: 2018-02-16 | End: 2018-02-26

## 2018-02-16 RX ORDER — ALBUTEROL SULFATE 90 UG/1
2 AEROSOL, METERED RESPIRATORY (INHALATION) EVERY 4 HOURS PRN
Qty: 1 INHALER | Refills: 0 | Status: SHIPPED | OUTPATIENT
Start: 2018-02-16 | End: 2019-01-01

## 2018-02-16 NOTE — PROGRESS NOTES
Chief Complaint   Patient presents with   • Cough     x2wks nasal drainage, bodyache       HISTORY OF PRESENT ILLNESS: Patient is a 57 y.o. female who presents today because she has a two-week history of right-sided nasal and sinus pain, pressure, drainage and congestion, coughing, wheezing, some shortness of breath, some blood production. Denies any recent fevers, chills, nausea, vomiting or diarrhea. She has been using over-the-counter cough and cold medications, DayQuil, NyQuil, no improvement.    Patient Active Problem List    Diagnosis Date Noted   • HTN (hypertension) 06/20/2013     Priority: Medium     Class: Chronic   • GERD (gastroesophageal reflux disease) 06/20/2013     Priority: Medium     Class: Chronic   • Plantar wart 08/03/2017   • Elevated TSH 08/03/2017   • Lateral epicondylitis of right elbow 03/17/2017   • Primary insomnia 08/24/2016   • Mild intermittent asthma without complication 08/24/2016   • Screening 03/17/2015   • Obesity 03/17/2015   • Dyslipidemia 05/15/2014   • Hypothyroid 05/15/2014   • Vitamin D deficiency 05/15/2014       Allergies:Shellfish allergy    Current Outpatient Prescriptions Ordered in [facility]   Medication Sig Dispense Refill   • doxycycline (VIBRAMYCIN) 100 MG Tab Take 1 Tab by mouth 2 times a day for 10 days. 20 Tab 0   • albuterol 108 (90 Base) MCG/ACT Aero Soln inhalation aerosol Inhale 2 Puffs by mouth every four hours as needed. 1 Inhaler 0   • levothyroxine (SYNTHROID) 25 MCG Tab TAKE 1 TAB BY MOUTH EVERY MORNING ON AN EMPTY STOMACH. 90 Tab 1   • lisinopril-hydrochlorothiazide (PRINZIDE, ZESTORETIC) 20-25 MG per tablet TAKE 1 TAB BY MOUTH EVERY DAY. 90 Tab 1   • vitamin D, Ergocalciferol, (DRISDOL) 75863 units Cap capsule TAKE 1 CAP BY MOUTH EVERY 7 DAYS. 12 Cap 1   • omeprazole (PRILOSEC) 20 MG delayed-release capsule TAKE 1 CAP BY MOUTH EVERY DAY. 90 Cap 3   • albuterol (VENTOLIN OR PROVENTIL) 108 (90 BASE) MCG/ACT AERS Inhale 2 Puffs by mouth every 6 hours as  needed for Shortness of Breath. 8.5 g 3   • meloxicam (MOBIC) 7.5 MG Tab Take 1 Tab by mouth every day. (Patient not taking: Reported on 8/3/2017) 30 Tab 3   • hydrocodone-acetaminophen (NORCO) 5-325 MG Tab per tablet Take 1-2 Tabs by mouth every 6 hours as needed. (Patient not taking: Reported on 8/3/2017) 20 Tab 0   • zolpidem (AMBIEN) 5 MG Tab Take 1 Tab by mouth at bedtime as needed for Sleep. 30 Tab 5   • ipratropium-albuterol (DUONEB) 0.5-2.5 (3) MG/3ML nebulizer solution 3 mL by Nebulization route every 6 hours as needed for Shortness of Breath. 3 mL 3   • fluticasone (FLOVENT HFA) 44 MCG/ACT AERO Inhale 2 Puffs by mouth 2 times a day. 1 Inhaler 3     No current Epic-ordered facility-administered medications on file.        Past Medical History:   Diagnosis Date   • Dyslipidemia 5/15/2014    Recently started working out with  - 3 weeks ago ,  Will try lifestyle x 3-4 months    • GERD (gastroesophageal reflux disease)    • Hypertension    • Hypothyroid 5/15/2014    TSH 3.82 - repeat in Sept   • Lateral epicondylitis of right elbow 3/17/2017   • Primary insomnia 8/24/2016   • Screening 3/17/2015   • Unspecified vitamin D deficiency 5/15/2014    5 uncorrected. Start 50,000IU daily.        Social History   Substance Use Topics   • Smoking status: Former Smoker     Packs/day: 1.00     Years: 5.00     Types: Cigarettes     Quit date: 5/15/2011   • Smokeless tobacco: Never Used   • Alcohol use 0.5 oz/week     1 Glasses of wine per week      Comment: twice weekly       Family Status   Relation Status   • Mother Alive   • Father Alive   • Sister Alive     Family History   Problem Relation Age of Onset   • Hypertension Mother    • Hypertension Father        ROS:  Review of Systems   Constitutional: Negative for fever, chills, weight loss and malaise/fatigue.   HENT: Negative for ear pain, nosebleeds, positive for congestion, no sore throat and neck pain.    Eyes: Negative for blurred vision.  "  Respiratory: Positive for cough, sputum production, shortness of breath and wheezing.    Cardiovascular: Negative for chest pain, palpitations, orthopnea and leg swelling.   Gastrointestinal: Negative for heartburn, nausea, vomiting and abdominal pain.   Genitourinary: Negative for dysuria, urgency and frequency.     Exam:  Blood pressure 126/72, pulse 90, temperature 36.3 °C (97.3 °F), resp. rate 16, height 1.676 m (5' 5.98\"), weight 86.2 kg (190 lb), SpO2 97 %, not currently breastfeeding.  General:  Well nourished, well developed female in NAD  Head:Normocephalic, atraumatic  Eyes: PERRLA, EOM within normal limits, no conjunctival injection, no scleral icterus, visual fields and acuity grossly intact.  Ears: Normal shape and symmetry, no tenderness, no discharge. External canals are without any significant edema or erythema. Tympanic membranes are without any inflammation, no effusion. Gross auditory acuity is intact  Nose: Symmetrical without tenderness, no discharge. Nasal mucosa on the right is erythematous and edematous almost to the point of occlusion  Mouth: reasonable hygiene, no erythema exudates or tonsillar enlargement.  Neck: no masses, range of motion within normal limits, no tracheal deviation. No obvious thyroid enlargement.  Pulmonary: chest is symmetrical with respiration, scattered wheezes and rhonchi bilaterally, not clearing with cough   Cardiovascular: regular rate and rhythm without murmurs, rubs, or gallops.  Extremities: no clubbing, cyanosis, or edema.    Please note that this dictation was created using voice recognition software. I have made every reasonable attempt to correct obvious errors, but I expect that there are errors of grammar and possibly content that I did not discover before finalizing the note.    Assessment/Plan:  1. Wheezing-associated respiratory infection (WARI)  doxycycline (VIBRAMYCIN) 100 MG Tab    albuterol 108 (90 Base) MCG/ACT Aero Soln inhalation aerosol "       Followup with primary care in the next 7-10 days if not significantly improving, return to the urgent care or go to the emergency room sooner for any worsening of symptoms.

## 2018-03-06 ENCOUNTER — TELEPHONE (OUTPATIENT)
Dept: MEDICAL GROUP | Facility: PHYSICIAN GROUP | Age: 58
End: 2018-03-06

## 2018-03-29 ENCOUNTER — TELEPHONE (OUTPATIENT)
Dept: MEDICAL GROUP | Facility: PHYSICIAN GROUP | Age: 58
End: 2018-03-29

## 2018-03-29 RX ORDER — ERGOCALCIFEROL 1.25 MG/1
CAPSULE ORAL
Qty: 12 CAP | Refills: 3 | Status: SHIPPED | OUTPATIENT
Start: 2018-03-29 | End: 2019-01-01 | Stop reason: SDUPTHER

## 2018-05-08 RX ORDER — OMEPRAZOLE 20 MG/1
CAPSULE, DELAYED RELEASE ORAL
Qty: 90 CAP | Refills: 0 | Status: SHIPPED | OUTPATIENT
Start: 2018-05-08 | End: 2018-11-20 | Stop reason: SDUPTHER

## 2018-06-20 ENCOUNTER — TELEPHONE (OUTPATIENT)
Dept: MEDICAL GROUP | Facility: PHYSICIAN GROUP | Age: 58
End: 2018-06-20

## 2018-06-20 DIAGNOSIS — Z12.31 ENCOUNTER FOR SCREENING MAMMOGRAM FOR BREAST CANCER: ICD-10-CM

## 2018-06-20 NOTE — TELEPHONE ENCOUNTER
1. Caller Name: Pt                      Call Back Number: [phone] (home)     2. Message: Pt called in stating that she is due for a Mammo and would like an order placed.    3. Patient approves office to leave a detailed voicemail/MyChart message: yes

## 2018-12-03 NOTE — TELEPHONE ENCOUNTER
*PT NEEDS TO MAKE APPT AND GET LAB UPDATED*  Was the patient seen in the last year in this department? No     Does patient have an active prescription for medications requested? No     Received Request Via: Pharmacy      Pt met protocol?: No    LAST OV 08/03/2017      Lab Results  Component Value Date/Time   TSHULTRASEN 4.080 (H) 03/10/2017 0825

## 2018-12-07 PROBLEM — E66.9 OBESITY (BMI 30-39.9): Status: ACTIVE | Noted: 2018-01-01

## 2018-12-07 NOTE — PROGRESS NOTES
Chief Complaint   Patient presents with   • Pharyngitis       HISTORY OF PRESENT ILLNESS: Patient is a 58 y.o. female who presents today because  she has a 4-6-week history of sinus congestion, intermittent sore throat, intermittent loss of voice.  She has failed over-the-counter regimens.    Patient Active Problem List    Diagnosis Date Noted   • HTN (hypertension) 06/20/2013     Priority: Medium     Class: Chronic   • GERD (gastroesophageal reflux disease) 06/20/2013     Priority: Medium     Class: Chronic   • Obesity (BMI 30-39.9) 12/07/2018   • Plantar wart 08/03/2017   • Elevated TSH 08/03/2017   • Lateral epicondylitis of right elbow 03/17/2017   • Primary insomnia 08/24/2016   • Mild intermittent asthma without complication 08/24/2016   • Screening 03/17/2015   • Obesity 03/17/2015   • Dyslipidemia 05/15/2014   • Hypothyroid 05/15/2014   • Vitamin D deficiency 05/15/2014       Allergies:Shellfish allergy    Current Outpatient Prescriptions Ordered in [facility]   Medication Sig Dispense Refill   • amoxicillin-clavulanate (AUGMENTIN) 875-125 MG Tab Take 1 Tab by mouth 2 times a day for 7 days. 14 Tab 0   • levothyroxine (SYNTHROID) 25 MCG Tab TAKE 1 TAB BY MOUTH EVERY MORNING ON AN EMPTY STOMACH. 90 Tab 0   • omeprazole (PRILOSEC) 20 MG delayed-release capsule TAKE 1 CAPSULE BY MOUTH EVERY DAY 90 Cap 1   • lisinopril-hydrochlorothiazide (PRINZIDE, ZESTORETIC) 20-25 MG per tablet TAKE 1 TAB BY MOUTH EVERY DAY. 90 Tab 1   • vitamin D, Ergocalciferol, (DRISDOL) 76280 units Cap capsule TAKE 1 CAP BY MOUTH EVERY 7 DAYS. 12 Cap 3   • albuterol 108 (90 Base) MCG/ACT Aero Soln inhalation aerosol Inhale 2 Puffs by mouth every four hours as needed. 1 Inhaler 0   • meloxicam (MOBIC) 7.5 MG Tab Take 1 Tab by mouth every day. (Patient not taking: Reported on 8/3/2017) 30 Tab 3   • hydrocodone-acetaminophen (NORCO) 5-325 MG Tab per tablet Take 1-2 Tabs by mouth every 6 hours as needed. (Patient not taking: Reported on 8/3/2017)  20 Tab 0   • zolpidem (AMBIEN) 5 MG Tab Take 1 Tab by mouth at bedtime as needed for Sleep. 30 Tab 5   • ipratropium-albuterol (DUONEB) 0.5-2.5 (3) MG/3ML nebulizer solution 3 mL by Nebulization route every 6 hours as needed for Shortness of Breath. 3 mL 3   • fluticasone (FLOVENT HFA) 44 MCG/ACT AERO Inhale 2 Puffs by mouth 2 times a day. 1 Inhaler 3   • albuterol (VENTOLIN OR PROVENTIL) 108 (90 BASE) MCG/ACT AERS Inhale 2 Puffs by mouth every 6 hours as needed for Shortness of Breath. 8.5 g 3     No current [facility]-ordered facility-administered medications on file.        Past Medical History:   Diagnosis Date   • Dyslipidemia 5/15/2014    Recently started working out with  - 3 weeks ago ,  Will try lifestyle x 3-4 months    • GERD (gastroesophageal reflux disease)    • Hypertension    • Hypothyroid 5/15/2014    TSH 3.82 - repeat in Sept   • Lateral epicondylitis of right elbow 3/17/2017   • Primary insomnia 8/24/2016   • Screening 3/17/2015   • Unspecified vitamin D deficiency 5/15/2014    5 uncorrected. Start 50,000IU daily.        Social History   Substance Use Topics   • Smoking status: Former Smoker     Packs/day: 1.00     Years: 5.00     Types: Cigarettes     Quit date: 5/15/2011   • Smokeless tobacco: Never Used   • Alcohol use 0.5 oz/week     1 Glasses of wine per week      Comment: twice weekly       Family Status   Relation Status   • Mo Alive   • Fa Alive   • Sis Alive     Family History   Problem Relation Age of Onset   • Hypertension Mother    • Hypertension Father        ROS:  Review of Systems   Constitutional: Negative for fever, chills, weight loss and malaise/fatigue.   HENT: Negative for ear pain, nosebleeds, positive for congestion, sore throat and no neck pain.    Eyes: Negative for blurred vision.   Respiratory: Positive for cough, no sputum production, shortness of breath and wheezing.    Cardiovascular: Negative for chest pain, palpitations, orthopnea and leg swelling.  "  Gastrointestinal: Negative for heartburn, nausea, vomiting and abdominal pain.   Genitourinary: Negative for dysuria, urgency and frequency.     Exam:  Blood pressure 136/94, pulse 84, temperature 37.2 °C (98.9 °F), temperature source Temporal, resp. rate 16, height 1.676 m (5' 6\"), weight 87.5 kg (193 lb), SpO2 96 %, not currently breastfeeding.  General:  Well nourished, well developed female in NAD, poor vocal tone  Head:Normocephalic, atraumatic  Eyes: PERRLA, EOM within normal limits, no conjunctival injection, no scleral icterus, visual fields and acuity grossly intact.  Ears: Normal shape and symmetry, no tenderness, no discharge. External canals are without any significant edema or erythema. Tympanic membranes are without any inflammation, no effusion. Gross auditory acuity is intact  Nose: Symmetrical without tenderness, no discharge.  Nasal mucosa on the right is erythematous and edematous to the point of occlusion, there is posterior nasal cavity exudate  Mouth: reasonable hygiene, no erythema exudates or tonsillar enlargement.  Neck: no masses, range of motion within normal limits, no tracheal deviation. No obvious thyroid enlargement.  Pulmonary: chest is symmetrical with respiration, no wheezes, crackles, or rhonchi.  Cardiovascular: regular rate and rhythm without murmurs, rubs, or gallops.  Extremities: no clubbing, cyanosis, or edema.    Please note that this dictation was created using voice recognition software. I have made every reasonable attempt to correct obvious errors, but I expect that there are errors of grammar and possibly content that I did not discover before finalizing the note.    Assessment/Plan:  1. Subacute sinusitis, unspecified location  amoxicillin-clavulanate (AUGMENTIN) 875-125 MG Tab   2. Obesity (BMI 30-39.9)  Patient identified as having weight management issue.  Appropriate orders and counseling given.   Over-the-counter decongestants as tolerated    Followup with " primary care in the next 7-10 days if not significantly improving, return to the urgent care or go to the emergency room sooner for any worsening of symptoms.

## 2019-01-01 ENCOUNTER — HOSPITAL ENCOUNTER (OUTPATIENT)
Dept: LAB | Facility: MEDICAL CENTER | Age: 59
End: 2019-05-20
Attending: INTERNAL MEDICINE
Payer: COMMERCIAL

## 2019-01-01 ENCOUNTER — TELEPHONE (OUTPATIENT)
Dept: MEDICAL GROUP | Facility: PHYSICIAN GROUP | Age: 59
End: 2019-01-01

## 2019-01-01 ENCOUNTER — HOSPITAL ENCOUNTER (OUTPATIENT)
Facility: MEDICAL CENTER | Age: 59
DRG: 432 | End: 2019-11-15
Admitting: HOSPITALIST
Payer: MEDICAID

## 2019-01-01 ENCOUNTER — APPOINTMENT (OUTPATIENT)
Dept: RADIOLOGY | Facility: MEDICAL CENTER | Age: 59
DRG: 432 | End: 2019-01-01
Attending: INTERNAL MEDICINE
Payer: MEDICAID

## 2019-01-01 ENCOUNTER — OFFICE VISIT (OUTPATIENT)
Dept: MEDICAL GROUP | Facility: PHYSICIAN GROUP | Age: 59
End: 2019-01-01
Payer: COMMERCIAL

## 2019-01-01 ENCOUNTER — HOSPITAL ENCOUNTER (INPATIENT)
Facility: MEDICAL CENTER | Age: 59
LOS: 11 days | DRG: 432 | End: 2019-11-26
Attending: HOSPITALIST | Admitting: HOSPITALIST
Payer: MEDICAID

## 2019-01-01 VITALS
OXYGEN SATURATION: 53 % | HEART RATE: 77 BPM | SYSTOLIC BLOOD PRESSURE: 42 MMHG | BODY MASS INDEX: 33.8 KG/M2 | RESPIRATION RATE: 36 BRPM | DIASTOLIC BLOOD PRESSURE: 16 MMHG | TEMPERATURE: 98.6 F | HEIGHT: 66 IN | WEIGHT: 210.32 LBS

## 2019-01-01 VITALS
HEART RATE: 76 BPM | SYSTOLIC BLOOD PRESSURE: 130 MMHG | WEIGHT: 189 LBS | OXYGEN SATURATION: 97 % | RESPIRATION RATE: 14 BRPM | HEIGHT: 66 IN | DIASTOLIC BLOOD PRESSURE: 68 MMHG | TEMPERATURE: 98.7 F | BODY MASS INDEX: 30.37 KG/M2

## 2019-01-01 DIAGNOSIS — R79.89 ABNORMAL CBC: ICD-10-CM

## 2019-01-01 DIAGNOSIS — E03.9 HYPOTHYROIDISM, UNSPECIFIED TYPE: ICD-10-CM

## 2019-01-01 DIAGNOSIS — K76.82 HEPATIC ENCEPHALOPATHY (HCC): ICD-10-CM

## 2019-01-01 DIAGNOSIS — K92.2 GASTROINTESTINAL HEMORRHAGE, UNSPECIFIED GASTROINTESTINAL HEMORRHAGE TYPE: ICD-10-CM

## 2019-01-01 DIAGNOSIS — E78.5 DYSLIPIDEMIA: ICD-10-CM

## 2019-01-01 DIAGNOSIS — E55.9 VITAMIN D DEFICIENCY: ICD-10-CM

## 2019-01-01 DIAGNOSIS — I10 ESSENTIAL HYPERTENSION: ICD-10-CM

## 2019-01-01 DIAGNOSIS — D69.6 DECREASED PLATELET COUNT (HCC): ICD-10-CM

## 2019-01-01 DIAGNOSIS — R74.8 ELEVATED LIVER ENZYMES: ICD-10-CM

## 2019-01-01 DIAGNOSIS — K21.9 GASTROESOPHAGEAL REFLUX DISEASE, ESOPHAGITIS PRESENCE NOT SPECIFIED: ICD-10-CM

## 2019-01-01 DIAGNOSIS — J80 ARDS (ADULT RESPIRATORY DISTRESS SYNDROME) (HCC): ICD-10-CM

## 2019-01-01 DIAGNOSIS — F51.01 PRIMARY INSOMNIA: ICD-10-CM

## 2019-01-01 LAB
25(OH)D3 SERPL-MCNC: 43 NG/ML (ref 30–100)
25(OH)D3 SERPL-MCNC: 44 NG/ML (ref 30–100)
25(OH)D3 SERPL-MCNC: 77 NG/ML (ref 30–100)
ABO + RH BLD: NORMAL
ABO GROUP BLD: ABNORMAL
ABO GROUP BLD: ABNORMAL
ACTION RANGE TRIGGERED IACRT: NO
ACTION RANGE TRIGGERED IACRT: YES
ACTION RANGE TRIGGERED IACRT: YES
AFP-TM SERPL-MCNC: 7 NG/ML (ref 0–9)
ALBUMIN SERPL BCP-MCNC: 2.6 G/DL (ref 3.2–4.9)
ALBUMIN SERPL BCP-MCNC: 3.3 G/DL (ref 3.2–4.9)
ALBUMIN SERPL BCP-MCNC: 3.7 G/DL (ref 3.2–4.9)
ALBUMIN SERPL BCP-MCNC: 4.5 G/DL (ref 3.2–4.9)
ALBUMIN/GLOB SERPL: 0.7 G/DL
ALBUMIN/GLOB SERPL: 1.2 G/DL
ALBUMIN/GLOB SERPL: 1.3 G/DL
ALBUMIN/GLOB SERPL: 1.4 G/DL
ALP SERPL-CCNC: 149 U/L (ref 30–99)
ALP SERPL-CCNC: 156 U/L (ref 30–99)
ALP SERPL-CCNC: 93 U/L (ref 30–99)
ALP SERPL-CCNC: 99 U/L (ref 30–99)
ALT SERPL-CCNC: 141 U/L (ref 2–50)
ALT SERPL-CCNC: 26 U/L (ref 2–50)
ALT SERPL-CCNC: 28 U/L (ref 2–50)
ALT SERPL-CCNC: 32 U/L (ref 2–50)
AMMONIA PLAS-SCNC: 32 UMOL/L (ref 11–45)
AMMONIA PLAS-SCNC: 46 UMOL/L (ref 11–45)
AMMONIA PLAS-SCNC: 51 UMOL/L (ref 11–45)
ANION GAP SERPL CALC-SCNC: 10 MMOL/L (ref 0–11.9)
ANION GAP SERPL CALC-SCNC: 12 MMOL/L (ref 0–11.9)
ANION GAP SERPL CALC-SCNC: 13 MMOL/L (ref 0–11.9)
ANION GAP SERPL CALC-SCNC: 13 MMOL/L (ref 0–11.9)
ANION GAP SERPL CALC-SCNC: 4 MMOL/L (ref 0–11.9)
ANION GAP SERPL CALC-SCNC: 5 MMOL/L (ref 0–11.9)
ANION GAP SERPL CALC-SCNC: 6 MMOL/L (ref 0–11.9)
ANISOCYTOSIS BLD QL SMEAR: ABNORMAL
ANNOTATION COMMENT IMP: ABNORMAL
APPEARANCE FLD: NORMAL
APPEARANCE FLD: NORMAL
APPEARANCE UR: ABNORMAL
APPEARANCE UR: CLEAR
APTT PPP: 36.9 SEC (ref 24.7–36)
APTT PPP: 40.4 SEC (ref 24.7–36)
APTT PPP: 42.2 SEC (ref 24.7–36)
AST SERPL-CCNC: 330 U/L (ref 12–45)
AST SERPL-CCNC: 61 U/L (ref 12–45)
AST SERPL-CCNC: 64 U/L (ref 12–45)
AST SERPL-CCNC: 69 U/L (ref 12–45)
BACTERIA #/AREA URNS HPF: ABNORMAL /HPF
BACTERIA #/AREA URNS HPF: NEGATIVE /HPF
BACTERIA BLD CULT: NORMAL
BACTERIA FLD AEROBE CULT: NORMAL
BACTERIA SPEC RESP CULT: NORMAL
BARCODED ABORH UBTYP: 7300
BARCODED ABORH UBTYP: 7300
BARCODED PRD CODE UBPRD: ABNORMAL
BARCODED PRD CODE UBPRD: ABNORMAL
BARCODED UNIT NUM UBUNT: ABNORMAL
BARCODED UNIT NUM UBUNT: ABNORMAL
BASE EXCESS BLDA CALC-SCNC: -2 MMOL/L (ref -4–3)
BASE EXCESS BLDA CALC-SCNC: -4 MMOL/L (ref -4–3)
BASE EXCESS BLDA CALC-SCNC: -5 MMOL/L (ref -4–3)
BASE EXCESS BLDA CALC-SCNC: 4 MMOL/L (ref -4–3)
BASE EXCESS BLDA CALC-SCNC: 7 MMOL/L (ref -4–3)
BASE EXCESS BLDV CALC-SCNC: 1 MMOL/L (ref -4–3)
BASOPHILS # BLD AUTO: 0 % (ref 0–1.8)
BASOPHILS # BLD AUTO: 0.1 % (ref 0–1.8)
BASOPHILS # BLD AUTO: 0.1 % (ref 0–1.8)
BASOPHILS # BLD AUTO: 0.2 % (ref 0–1.8)
BASOPHILS # BLD AUTO: 0.2 % (ref 0–1.8)
BASOPHILS # BLD AUTO: 0.3 % (ref 0–1.8)
BASOPHILS # BLD: 0 K/UL (ref 0–0.12)
BASOPHILS # BLD: 0.02 K/UL (ref 0–0.12)
BASOPHILS # BLD: 0.02 K/UL (ref 0–0.12)
BASOPHILS # BLD: 0.03 K/UL (ref 0–0.12)
BASOPHILS # BLD: 0.04 K/UL (ref 0–0.12)
BASOPHILS # BLD: 0.05 K/UL (ref 0–0.12)
BILIRUB SERPL-MCNC: 1.3 MG/DL (ref 0.1–1.5)
BILIRUB SERPL-MCNC: 7.7 MG/DL (ref 0.1–1.5)
BILIRUB SERPL-MCNC: 8.2 MG/DL (ref 0.1–1.5)
BILIRUB SERPL-MCNC: 9.4 MG/DL (ref 0.1–1.5)
BILIRUB UR QL STRIP.AUTO: ABNORMAL
BILIRUB UR QL STRIP.AUTO: ABNORMAL
BLD GP AB SCN SERPL QL: ABNORMAL
BODY FLD TYPE: NORMAL
BODY FLD TYPE: NORMAL
BODY TEMPERATURE: ABNORMAL DEGREES
BODY TEMPERATURE: NORMAL DEGREES
BUN SERPL-MCNC: 13 MG/DL (ref 8–22)
BUN SERPL-MCNC: 26 MG/DL (ref 8–22)
BUN SERPL-MCNC: 27 MG/DL (ref 8–22)
BUN SERPL-MCNC: 28 MG/DL (ref 8–22)
BUN SERPL-MCNC: 32 MG/DL (ref 8–22)
BUN SERPL-MCNC: 35 MG/DL (ref 8–22)
BUN SERPL-MCNC: 36 MG/DL (ref 8–22)
BUN SERPL-MCNC: 38 MG/DL (ref 8–22)
BUN SERPL-MCNC: 38 MG/DL (ref 8–22)
BUN SERPL-MCNC: 42 MG/DL (ref 8–22)
BURR CELLS BLD QL SMEAR: NORMAL
CALCIUM SERPL-MCNC: 10.5 MG/DL (ref 8.5–10.5)
CALCIUM SERPL-MCNC: 8.8 MG/DL (ref 8.5–10.5)
CALCIUM SERPL-MCNC: 8.9 MG/DL (ref 8.5–10.5)
CALCIUM SERPL-MCNC: 9 MG/DL (ref 8.5–10.5)
CALCIUM SERPL-MCNC: 9.1 MG/DL (ref 8.5–10.5)
CALCIUM SERPL-MCNC: 9.4 MG/DL (ref 8.5–10.5)
CALCIUM SERPL-MCNC: 9.7 MG/DL (ref 8.5–10.5)
CALCIUM SERPL-MCNC: 9.8 MG/DL (ref 8.5–10.5)
CALCIUM SERPL-MCNC: 9.8 MG/DL (ref 8.5–10.5)
CALCIUM SERPL-MCNC: 9.9 MG/DL (ref 8.5–10.5)
CERULOPLASMIN SERPL-MCNC: 18 MG/DL (ref 17–54)
CFT BLD TEG: 5.1 MIN (ref 5–10)
CFT BLD TEG: 6.2 MIN (ref 5–10)
CHLORIDE SERPL-SCNC: 110 MMOL/L (ref 96–112)
CHLORIDE SERPL-SCNC: 111 MMOL/L (ref 96–112)
CHLORIDE SERPL-SCNC: 113 MMOL/L (ref 96–112)
CHLORIDE SERPL-SCNC: 115 MMOL/L (ref 96–112)
CHLORIDE SERPL-SCNC: 115 MMOL/L (ref 96–112)
CHLORIDE SERPL-SCNC: 116 MMOL/L (ref 96–112)
CHLORIDE SERPL-SCNC: 117 MMOL/L (ref 96–112)
CHLORIDE SERPL-SCNC: 93 MMOL/L (ref 96–112)
CHOLEST SERPL-MCNC: 303 MG/DL (ref 100–199)
CLOT ANGLE BLD TEG: 73.1 DEGREES (ref 53–72)
CLOT ANGLE BLD TEG: 73.1 DEGREES (ref 53–72)
CLOT LYSIS 30M P MA LENFR BLD TEG: 0 % (ref 0–8)
CLOT LYSIS 30M P MA LENFR BLD TEG: 0 % (ref 0–8)
CO2 BLDA-SCNC: 21 MMOL/L (ref 20–33)
CO2 BLDA-SCNC: 22 MMOL/L (ref 20–33)
CO2 BLDA-SCNC: 23 MMOL/L (ref 20–33)
CO2 BLDA-SCNC: 27 MMOL/L (ref 20–33)
CO2 BLDA-SCNC: 32 MMOL/L (ref 20–33)
CO2 BLDV-SCNC: 27 MMOL/L (ref 20–33)
CO2 SERPL-SCNC: 19 MMOL/L (ref 20–33)
CO2 SERPL-SCNC: 22 MMOL/L (ref 20–33)
CO2 SERPL-SCNC: 23 MMOL/L (ref 20–33)
CO2 SERPL-SCNC: 25 MMOL/L (ref 20–33)
CO2 SERPL-SCNC: 27 MMOL/L (ref 20–33)
CO2 SERPL-SCNC: 27 MMOL/L (ref 20–33)
CO2 SERPL-SCNC: 29 MMOL/L (ref 20–33)
CO2 SERPL-SCNC: 34 MMOL/L (ref 20–33)
CO2 SERPL-SCNC: 34 MMOL/L (ref 20–33)
CO2 SERPL-SCNC: 36 MMOL/L (ref 20–33)
COLOR FLD: NORMAL
COLOR FLD: NORMAL
COLOR UR: ABNORMAL
COLOR UR: ABNORMAL
COMPONENT R 8504R: ABNORMAL
COMPONENT R 8504R: ABNORMAL
CREAT SERPL-MCNC: 0.69 MG/DL (ref 0.5–1.4)
CREAT SERPL-MCNC: 0.69 MG/DL (ref 0.5–1.4)
CREAT SERPL-MCNC: 0.74 MG/DL (ref 0.5–1.4)
CREAT SERPL-MCNC: 0.86 MG/DL (ref 0.5–1.4)
CREAT SERPL-MCNC: 0.88 MG/DL (ref 0.5–1.4)
CREAT SERPL-MCNC: 1.05 MG/DL (ref 0.5–1.4)
CREAT SERPL-MCNC: 1.27 MG/DL (ref 0.5–1.4)
CREAT SERPL-MCNC: 1.28 MG/DL (ref 0.5–1.4)
CREAT SERPL-MCNC: 1.3 MG/DL (ref 0.5–1.4)
CREAT SERPL-MCNC: 1.52 MG/DL (ref 0.5–1.4)
CREAT UR-MCNC: 13.1 MG/DL
CRP SERPL HS-MCNC: 5.01 MG/DL (ref 0–0.75)
CSF COMMENTS 1658: NORMAL
CT.EXTRINSIC BLD ROTEM: 1.2 MIN (ref 1–3)
CT.EXTRINSIC BLD ROTEM: 1.2 MIN (ref 1–3)
CYTOLOGY REG CYTOL: NORMAL
D DIMER PPP IA.FEU-MCNC: 9.71 UG/ML (FEU) (ref 0–0.5)
DAT C3D-SP REAG RBC QL: ABNORMAL
DAT C3D-SP REAG RBC QL: ABNORMAL
DAT IGG-SP REAG RBC QL: ABNORMAL
DAT IGG-SP REAG RBC QL: ABNORMAL
EOSINOPHIL # BLD AUTO: 0 K/UL (ref 0–0.51)
EOSINOPHIL # BLD AUTO: 0.14 K/UL (ref 0–0.51)
EOSINOPHIL # BLD AUTO: 0.15 K/UL (ref 0–0.51)
EOSINOPHIL # BLD AUTO: 0.19 K/UL (ref 0–0.51)
EOSINOPHIL # BLD AUTO: 0.23 K/UL (ref 0–0.51)
EOSINOPHIL NFR BLD: 0 % (ref 0–6.9)
EOSINOPHIL NFR BLD: 1 % (ref 0–6.9)
EOSINOPHIL NFR BLD: 1 % (ref 0–6.9)
EOSINOPHIL NFR BLD: 1.2 % (ref 0–6.9)
EOSINOPHIL NFR BLD: 1.3 % (ref 0–6.9)
EPI CELLS #/AREA URNS HPF: NEGATIVE /HPF
EPI CELLS #/AREA URNS HPF: NEGATIVE /HPF
ERYTHROCYTE [DISTWIDTH] IN BLOOD BY AUTOMATED COUNT: 106.3 FL (ref 35.9–50)
ERYTHROCYTE [DISTWIDTH] IN BLOOD BY AUTOMATED COUNT: 116.8 FL (ref 35.9–50)
ERYTHROCYTE [DISTWIDTH] IN BLOOD BY AUTOMATED COUNT: 46.7 FL (ref 35.9–50)
ERYTHROCYTE [DISTWIDTH] IN BLOOD BY AUTOMATED COUNT: 73.6 FL (ref 35.9–50)
ERYTHROCYTE [DISTWIDTH] IN BLOOD BY AUTOMATED COUNT: 75.7 FL (ref 35.9–50)
ERYTHROCYTE [DISTWIDTH] IN BLOOD BY AUTOMATED COUNT: 78.3 FL (ref 35.9–50)
ERYTHROCYTE [DISTWIDTH] IN BLOOD BY AUTOMATED COUNT: 79 FL (ref 35.9–50)
ERYTHROCYTE [DISTWIDTH] IN BLOOD BY AUTOMATED COUNT: 80 FL (ref 35.9–50)
ERYTHROCYTE [DISTWIDTH] IN BLOOD BY AUTOMATED COUNT: 89.2 FL (ref 35.9–50)
FASTING STATUS PATIENT QL REPORTED: NORMAL
FIBRINOGEN PPP-MCNC: 170 MG/DL (ref 215–460)
FOLATE SERPL-MCNC: 12.8 NG/ML
FOLATE SERPL-MCNC: 8.8 NG/ML
GIANT PLATELETS BLD QL SMEAR: NORMAL
GLOBULIN SER CALC-MCNC: 2.6 G/DL (ref 1.9–3.5)
GLOBULIN SER CALC-MCNC: 2.7 G/DL (ref 1.9–3.5)
GLOBULIN SER CALC-MCNC: 3.6 G/DL (ref 1.9–3.5)
GLOBULIN SER CALC-MCNC: 3.7 G/DL (ref 1.9–3.5)
GLUCOSE SERPL-MCNC: 103 MG/DL (ref 65–99)
GLUCOSE SERPL-MCNC: 114 MG/DL (ref 65–99)
GLUCOSE SERPL-MCNC: 118 MG/DL (ref 65–99)
GLUCOSE SERPL-MCNC: 119 MG/DL (ref 65–99)
GLUCOSE SERPL-MCNC: 128 MG/DL (ref 65–99)
GLUCOSE SERPL-MCNC: 128 MG/DL (ref 65–99)
GLUCOSE SERPL-MCNC: 129 MG/DL (ref 65–99)
GLUCOSE SERPL-MCNC: 139 MG/DL (ref 65–99)
GLUCOSE SERPL-MCNC: 139 MG/DL (ref 65–99)
GLUCOSE SERPL-MCNC: 172 MG/DL (ref 65–99)
GLUCOSE UR STRIP.AUTO-MCNC: NEGATIVE MG/DL
GLUCOSE UR STRIP.AUTO-MCNC: NEGATIVE MG/DL
GRAM STN SPEC: NORMAL
HAV AB SER QL IA: POSITIVE
HBV CORE AB SERPL QL IA: NEGATIVE
HBV SURFACE AB SERPL IA-ACNC: <3.1 MIU/ML (ref 0–10)
HBV SURFACE AG SER QL: NEGATIVE
HCO3 BLDA-SCNC: 19.8 MMOL/L (ref 17–25)
HCO3 BLDA-SCNC: 20.8 MMOL/L (ref 17–25)
HCO3 BLDA-SCNC: 22.1 MMOL/L (ref 17–25)
HCO3 BLDA-SCNC: 26.3 MMOL/L (ref 17–25)
HCO3 BLDA-SCNC: 30.9 MMOL/L (ref 17–25)
HCO3 BLDV-SCNC: 25.6 MMOL/L (ref 24–28)
HCT VFR BLD AUTO: 19.5 % (ref 37–47)
HCT VFR BLD AUTO: 21.8 % (ref 37–47)
HCT VFR BLD AUTO: 22.9 % (ref 37–47)
HCT VFR BLD AUTO: 24.6 % (ref 37–47)
HCT VFR BLD AUTO: 24.7 % (ref 37–47)
HCT VFR BLD AUTO: 25.7 % (ref 37–47)
HCT VFR BLD AUTO: 26.3 % (ref 37–47)
HCT VFR BLD AUTO: 26.5 % (ref 37–47)
HCT VFR BLD AUTO: 27.1 % (ref 37–47)
HCT VFR BLD AUTO: 27.2 % (ref 37–47)
HCT VFR BLD AUTO: 27.2 % (ref 37–47)
HCT VFR BLD AUTO: 27.3 % (ref 37–47)
HCT VFR BLD AUTO: 38.6 % (ref 37–47)
HCV AB SER QL: NEGATIVE
HDLC SERPL-MCNC: 115 MG/DL
HGB BLD-MCNC: 12.6 G/DL (ref 12–16)
HGB BLD-MCNC: 6.2 G/DL (ref 12–16)
HGB BLD-MCNC: 6.9 G/DL (ref 12–16)
HGB BLD-MCNC: 7.6 G/DL (ref 12–16)
HGB BLD-MCNC: 7.8 G/DL (ref 12–16)
HGB BLD-MCNC: 8 G/DL (ref 12–16)
HGB BLD-MCNC: 8 G/DL (ref 12–16)
HGB BLD-MCNC: 8.1 G/DL (ref 12–16)
HGB BLD-MCNC: 8.2 G/DL (ref 12–16)
HGB BLD-MCNC: 8.2 G/DL (ref 12–16)
HGB BLD-MCNC: 8.3 G/DL (ref 12–16)
HGB BLD-MCNC: 8.4 G/DL (ref 12–16)
HGB BLD-MCNC: 8.4 G/DL (ref 12–16)
HGB BLD-MCNC: 8.7 G/DL (ref 12–16)
HOROWITZ INDEX BLDA+IHG-RTO: 130 MM[HG]
HOROWITZ INDEX BLDA+IHG-RTO: 160 MM[HG]
HOROWITZ INDEX BLDA+IHG-RTO: 177 MM[HG]
HOROWITZ INDEX BLDA+IHG-RTO: 230 MM[HG]
HOROWITZ INDEX BLDA+IHG-RTO: 310 MM[HG]
HOROWITZ INDEX BLDV+IHG-RTO: 60 MM[HG]
HYALINE CASTS #/AREA URNS LPF: ABNORMAL /LPF
HYALINE CASTS #/AREA URNS LPF: ABNORMAL /LPF
IGA SERPL-MCNC: 641 MG/DL (ref 68–408)
IGG SERPL-MCNC: 1400 MG/DL (ref 768–1632)
IMM GRANULOCYTES # BLD AUTO: 0.14 K/UL (ref 0–0.11)
IMM GRANULOCYTES # BLD AUTO: 0.16 K/UL (ref 0–0.11)
IMM GRANULOCYTES # BLD AUTO: 0.17 K/UL (ref 0–0.11)
IMM GRANULOCYTES # BLD AUTO: 0.23 K/UL (ref 0–0.11)
IMM GRANULOCYTES # BLD AUTO: 0.59 K/UL (ref 0–0.11)
IMM GRANULOCYTES NFR BLD AUTO: 0.9 % (ref 0–0.9)
IMM GRANULOCYTES NFR BLD AUTO: 1 % (ref 0–0.9)
IMM GRANULOCYTES NFR BLD AUTO: 1 % (ref 0–0.9)
IMM GRANULOCYTES NFR BLD AUTO: 1.5 % (ref 0–0.9)
IMM GRANULOCYTES NFR BLD AUTO: 2.4 % (ref 0–0.9)
INR PPP: 1.61 (ref 0.87–1.13)
INR PPP: 1.73 (ref 0.87–1.13)
INR PPP: 1.77 (ref 0.87–1.13)
INST. QUALIFIED PATIENT IIQPT: YES
IRON SATN MFR SERPL: 95 % (ref 15–55)
IRON SERPL-MCNC: 113 UG/DL (ref 40–170)
KETONES UR STRIP.AUTO-MCNC: NEGATIVE MG/DL
KETONES UR STRIP.AUTO-MCNC: NEGATIVE MG/DL
LDLC SERPL CALC-MCNC: 176 MG/DL
LEUKOCYTE ESTERASE UR QL STRIP.AUTO: ABNORMAL
LEUKOCYTE ESTERASE UR QL STRIP.AUTO: ABNORMAL
LYMPHOCYTES # BLD AUTO: 1.11 K/UL (ref 1–4.8)
LYMPHOCYTES # BLD AUTO: 1.49 K/UL (ref 1–4.8)
LYMPHOCYTES # BLD AUTO: 1.52 K/UL (ref 1–4.8)
LYMPHOCYTES # BLD AUTO: 1.7 K/UL (ref 1–4.8)
LYMPHOCYTES # BLD AUTO: 1.75 K/UL (ref 1–4.8)
LYMPHOCYTES # BLD AUTO: 1.79 K/UL (ref 1–4.8)
LYMPHOCYTES # BLD AUTO: 2.05 K/UL (ref 1–4.8)
LYMPHOCYTES # BLD AUTO: 2.7 K/UL (ref 1–4.8)
LYMPHOCYTES NFR BLD: 10.4 % (ref 22–41)
LYMPHOCYTES NFR BLD: 10.8 % (ref 22–41)
LYMPHOCYTES NFR BLD: 11.3 % (ref 22–41)
LYMPHOCYTES NFR BLD: 11.3 % (ref 22–41)
LYMPHOCYTES NFR BLD: 11.4 % (ref 22–41)
LYMPHOCYTES NFR BLD: 13 % (ref 22–41)
LYMPHOCYTES NFR BLD: 4.5 % (ref 22–41)
LYMPHOCYTES NFR BLD: 6.8 % (ref 22–41)
LYMPHOCYTES NFR FLD: 12 %
LYMPHOCYTES NFR FLD: 16 %
MACROCYTES BLD QL SMEAR: ABNORMAL
MAGNESIUM SERPL-MCNC: 2 MG/DL (ref 1.5–2.5)
MAGNESIUM SERPL-MCNC: 2.1 MG/DL (ref 1.5–2.5)
MAGNESIUM SERPL-MCNC: 2.1 MG/DL (ref 1.5–2.5)
MAGNESIUM SERPL-MCNC: 2.2 MG/DL (ref 1.5–2.5)
MAGNESIUM SERPL-MCNC: 2.2 MG/DL (ref 1.5–2.5)
MANUAL DIFF BLD: NORMAL
MCF BLD TEG: 31.7 MM (ref 50–70)
MCF BLD TEG: 61.7 MM (ref 50–70)
MCH RBC QN AUTO: 32.1 PG (ref 27–33)
MCH RBC QN AUTO: 32.6 PG (ref 27–33)
MCH RBC QN AUTO: 32.8 PG (ref 27–33)
MCH RBC QN AUTO: 32.9 PG (ref 27–33)
MCH RBC QN AUTO: 33.3 PG (ref 27–33)
MCH RBC QN AUTO: 33.5 PG (ref 27–33)
MCH RBC QN AUTO: 33.7 PG (ref 27–33)
MCH RBC QN AUTO: 33.8 PG (ref 27–33)
MCH RBC QN AUTO: 35.2 PG (ref 27–33)
MCHC RBC AUTO-ENTMCNC: 29.4 G/DL (ref 33.6–35)
MCHC RBC AUTO-ENTMCNC: 30 G/DL (ref 33.6–35)
MCHC RBC AUTO-ENTMCNC: 30.9 G/DL (ref 33.6–35)
MCHC RBC AUTO-ENTMCNC: 31 G/DL (ref 33.6–35)
MCHC RBC AUTO-ENTMCNC: 31.2 G/DL (ref 33.6–35)
MCHC RBC AUTO-ENTMCNC: 31.7 G/DL (ref 33.6–35)
MCHC RBC AUTO-ENTMCNC: 31.9 G/DL (ref 33.6–35)
MCHC RBC AUTO-ENTMCNC: 32.4 G/DL (ref 33.6–35)
MCHC RBC AUTO-ENTMCNC: 32.6 G/DL (ref 33.6–35)
MCV RBC AUTO: 101.2 FL (ref 81.4–97.8)
MCV RBC AUTO: 101.2 FL (ref 81.4–97.8)
MCV RBC AUTO: 102.1 FL (ref 81.4–97.8)
MCV RBC AUTO: 105.6 FL (ref 81.4–97.8)
MCV RBC AUTO: 107.8 FL (ref 81.4–97.8)
MCV RBC AUTO: 108.4 FL (ref 81.4–97.8)
MCV RBC AUTO: 108.8 FL (ref 81.4–97.8)
MCV RBC AUTO: 111 FL (ref 81.4–97.8)
MCV RBC AUTO: 114.8 FL (ref 81.4–97.8)
MESOTHL CELL NFR FLD: 7 %
METAMYELOCYTES NFR BLD MANUAL: 0.8 %
MICRO URNS: ABNORMAL
MICRO URNS: ABNORMAL
MITOCHONDRIA M2 IGG SER-ACNC: 10.1 UNITS (ref 0–20)
MONOCYTES # BLD AUTO: 0.92 K/UL (ref 0–0.85)
MONOCYTES # BLD AUTO: 1.16 K/UL (ref 0–0.85)
MONOCYTES # BLD AUTO: 1.27 K/UL (ref 0–0.85)
MONOCYTES # BLD AUTO: 1.49 K/UL (ref 0–0.85)
MONOCYTES # BLD AUTO: 1.5 K/UL (ref 0–0.85)
MONOCYTES # BLD AUTO: 1.73 K/UL (ref 0–0.85)
MONOCYTES # BLD AUTO: 1.76 K/UL (ref 0–0.85)
MONOCYTES # BLD AUTO: 1.86 K/UL (ref 0–0.85)
MONOCYTES NFR BLD AUTO: 4.4 % (ref 0–13.4)
MONOCYTES NFR BLD AUTO: 7.2 % (ref 0–13.4)
MONOCYTES NFR BLD AUTO: 7.8 % (ref 0–13.4)
MONOCYTES NFR BLD AUTO: 8.5 % (ref 0–13.4)
MONOCYTES NFR BLD AUTO: 8.7 % (ref 0–13.4)
MONOCYTES NFR BLD AUTO: 9.2 % (ref 0–13.4)
MONOCYTES NFR BLD AUTO: 9.5 % (ref 0–13.4)
MONOCYTES NFR BLD AUTO: 9.5 % (ref 0–13.4)
MONONUC CELLS NFR FLD: 2 %
MONONUC CELLS NFR FLD: 6 %
MORPHOLOGY BLD-IMP: NORMAL
MRSA DNA SPEC QL NAA+PROBE: NORMAL
MYELOCYTES NFR BLD MANUAL: 0.8 %
NEUTROPHILS # BLD AUTO: 10.33 K/UL (ref 2–7.15)
NEUTROPHILS # BLD AUTO: 11.62 K/UL (ref 2–7.15)
NEUTROPHILS # BLD AUTO: 12.58 K/UL (ref 2–7.15)
NEUTROPHILS # BLD AUTO: 13.89 K/UL (ref 2–7.15)
NEUTROPHILS # BLD AUTO: 13.91 K/UL (ref 2–7.15)
NEUTROPHILS # BLD AUTO: 17.18 K/UL (ref 2–7.15)
NEUTROPHILS # BLD AUTO: 18.2 K/UL (ref 2–7.15)
NEUTROPHILS # BLD AUTO: 21.07 K/UL (ref 2–7.15)
NEUTROPHILS NFR BLD: 76.7 % (ref 44–72)
NEUTROPHILS NFR BLD: 77.1 % (ref 44–72)
NEUTROPHILS NFR BLD: 77.6 % (ref 44–72)
NEUTROPHILS NFR BLD: 78.2 % (ref 44–72)
NEUTROPHILS NFR BLD: 80.9 % (ref 44–72)
NEUTROPHILS NFR BLD: 82.6 % (ref 44–72)
NEUTROPHILS NFR BLD: 83.1 % (ref 44–72)
NEUTROPHILS NFR BLD: 85.7 % (ref 44–72)
NEUTROPHILS NFR FLD: 71 %
NEUTROPHILS NFR FLD: 86 %
NITRITE UR QL STRIP.AUTO: NEGATIVE
NITRITE UR QL STRIP.AUTO: NEGATIVE
NRBC # BLD AUTO: 0 K/UL
NRBC # BLD AUTO: 0 K/UL
NRBC # BLD AUTO: 0.02 K/UL
NRBC # BLD AUTO: 0.02 K/UL
NRBC # BLD AUTO: 0.03 K/UL
NRBC # BLD AUTO: 0.04 K/UL
NRBC # BLD AUTO: 0.05 K/UL
NRBC # BLD AUTO: 0.1 K/UL
NRBC BLD-RTO: 0 /100 WBC
NRBC BLD-RTO: 0 /100 WBC
NRBC BLD-RTO: 0.1 /100 WBC
NRBC BLD-RTO: 0.1 /100 WBC
NRBC BLD-RTO: 0.2 /100 WBC
NRBC BLD-RTO: 0.5 /100 WBC
NT-PROBNP SERPL IA-MCNC: 2694 PG/ML (ref 0–125)
NUCLEAR IGG SER QL IA: NORMAL
O2/TOTAL GAS SETTING VFR VENT: 30 %
O2/TOTAL GAS SETTING VFR VENT: 30 %
O2/TOTAL GAS SETTING VFR VENT: 40 %
O2/TOTAL GAS SETTING VFR VENT: 40 %
O2/TOTAL GAS SETTING VFR VENT: 50 %
O2/TOTAL GAS SETTING VFR VENT: 60 %
OVALOCYTES BLD QL SMEAR: NORMAL
PA AA BLD-ACNC: 4.5 %
PA ADP BLD-ACNC: 63.4 %
PATHOLOGIST INTERPRETATION PTRX: ABNORMAL
PCO2 BLDA: 19.8 MMHG (ref 26–37)
PCO2 BLDA: 32.6 MMHG (ref 26–37)
PCO2 BLDA: 33.5 MMHG (ref 26–37)
PCO2 BLDA: 34.3 MMHG (ref 26–37)
PCO2 BLDA: 40.8 MMHG (ref 26–37)
PCO2 BLDV: 41.9 MMHG (ref 41–51)
PCO2 TEMP ADJ BLDA: 19.4 MMHG (ref 26–37)
PCO2 TEMP ADJ BLDA: 32.6 MMHG (ref 26–37)
PCO2 TEMP ADJ BLDA: 32.7 MMHG (ref 26–37)
PCO2 TEMP ADJ BLDA: 33.1 MMHG (ref 26–37)
PCO2 TEMP ADJ BLDA: 40.8 MMHG (ref 26–37)
PCO2 TEMP ADJ BLDV: 41.2 MMHG (ref 41–51)
PH BLDA: 7.38 [PH] (ref 7.4–7.5)
PH BLDA: 7.41 [PH] (ref 7.4–7.5)
PH BLDA: 7.42 [PH] (ref 7.4–7.5)
PH BLDA: 7.49 [PH] (ref 7.4–7.5)
PH BLDA: 7.73 [PH] (ref 7.4–7.5)
PH BLDV: 7.39 [PH] (ref 7.31–7.45)
PH TEMP ADJ BLDA: 7.39 [PH] (ref 7.4–7.5)
PH TEMP ADJ BLDA: 7.41 [PH] (ref 7.4–7.5)
PH TEMP ADJ BLDA: 7.43 [PH] (ref 7.4–7.5)
PH TEMP ADJ BLDA: 7.49 [PH] (ref 7.4–7.5)
PH TEMP ADJ BLDA: 7.74 [PH] (ref 7.4–7.5)
PH TEMP ADJ BLDV: 7.4 [PH] (ref 7.31–7.45)
PH UR STRIP.AUTO: 5 [PH] (ref 5–8)
PH UR STRIP.AUTO: 5 [PH] (ref 5–8)
PHOSPHATE SERPL-MCNC: 2.7 MG/DL (ref 2.5–4.5)
PHOSPHATE SERPL-MCNC: 2.7 MG/DL (ref 2.5–4.5)
PHOSPHATE SERPL-MCNC: 3.5 MG/DL (ref 2.5–4.5)
PHOSPHATE SERPL-MCNC: 4 MG/DL (ref 2.5–4.5)
PLATELET # BLD AUTO: 110 K/UL (ref 164–446)
PLATELET # BLD AUTO: 127 K/UL (ref 164–446)
PLATELET # BLD AUTO: 129 K/UL (ref 164–446)
PLATELET # BLD AUTO: 132 K/UL (ref 164–446)
PLATELET # BLD AUTO: 133 K/UL (ref 164–446)
PLATELET # BLD AUTO: 137 K/UL (ref 164–446)
PLATELET # BLD AUTO: 158 K/UL (ref 164–446)
PLATELET # BLD AUTO: 92 K/UL (ref 164–446)
PLATELET # BLD AUTO: 98 K/UL (ref 164–446)
PLATELET BLD QL SMEAR: NORMAL
PMV BLD AUTO: 10.2 FL (ref 9–12.9)
PMV BLD AUTO: 10.3 FL (ref 9–12.9)
PMV BLD AUTO: 10.4 FL (ref 9–12.9)
PMV BLD AUTO: 10.6 FL (ref 9–12.9)
PMV BLD AUTO: 10.7 FL (ref 9–12.9)
PMV BLD AUTO: 10.7 FL (ref 9–12.9)
PMV BLD AUTO: 11.1 FL (ref 9–12.9)
PMV BLD AUTO: 11.3 FL (ref 9–12.9)
PMV BLD AUTO: 9.8 FL (ref 9–12.9)
PO2 BLDA: 106 MMHG (ref 64–87)
PO2 BLDA: 64 MMHG (ref 64–87)
PO2 BLDA: 65 MMHG (ref 64–87)
PO2 BLDA: 69 MMHG (ref 64–87)
PO2 BLDA: 93 MMHG (ref 64–87)
PO2 BLDV: 24 MMHG (ref 25–40)
PO2 TEMP ADJ BLDA: 102 MMHG (ref 64–87)
PO2 TEMP ADJ BLDA: 61 MMHG (ref 64–87)
PO2 TEMP ADJ BLDA: 65 MMHG (ref 64–87)
PO2 TEMP ADJ BLDA: 69 MMHG (ref 64–87)
PO2 TEMP ADJ BLDA: 91 MMHG (ref 64–87)
PO2 TEMP ADJ BLDV: 23 MMHG (ref 25–40)
POIKILOCYTOSIS BLD QL SMEAR: NORMAL
POLYCHROMASIA BLD QL SMEAR: NORMAL
POTASSIUM SERPL-SCNC: 2.3 MMOL/L (ref 3.6–5.5)
POTASSIUM SERPL-SCNC: 2.3 MMOL/L (ref 3.6–5.5)
POTASSIUM SERPL-SCNC: 2.4 MMOL/L (ref 3.6–5.5)
POTASSIUM SERPL-SCNC: 2.6 MMOL/L (ref 3.6–5.5)
POTASSIUM SERPL-SCNC: 2.8 MMOL/L (ref 3.6–5.5)
POTASSIUM SERPL-SCNC: 2.8 MMOL/L (ref 3.6–5.5)
POTASSIUM SERPL-SCNC: 2.9 MMOL/L (ref 3.6–5.5)
POTASSIUM SERPL-SCNC: 3.1 MMOL/L (ref 3.6–5.5)
POTASSIUM SERPL-SCNC: 3.1 MMOL/L (ref 3.6–5.5)
POTASSIUM SERPL-SCNC: 3.2 MMOL/L (ref 3.6–5.5)
POTASSIUM SERPL-SCNC: 3.4 MMOL/L (ref 3.6–5.5)
POTASSIUM SERPL-SCNC: 3.5 MMOL/L (ref 3.6–5.5)
POTASSIUM SERPL-SCNC: 3.6 MMOL/L (ref 3.6–5.5)
POTASSIUM SERPL-SCNC: 3.7 MMOL/L (ref 3.6–5.5)
POTASSIUM SERPL-SCNC: 3.7 MMOL/L (ref 3.6–5.5)
POTASSIUM SERPL-SCNC: 3.8 MMOL/L (ref 3.6–5.5)
POTASSIUM SERPL-SCNC: 3.9 MMOL/L (ref 3.6–5.5)
POTASSIUM SERPL-SCNC: 4 MMOL/L (ref 3.6–5.5)
POTASSIUM SERPL-SCNC: 4.5 MMOL/L (ref 3.6–5.5)
PREALB SERPL-MCNC: 10 MG/DL (ref 18–38)
PROCALCITONIN SERPL-MCNC: 0.68 NG/ML
PRODUCT TYPE UPROD: ABNORMAL
PRODUCT TYPE UPROD: ABNORMAL
PROT SERPL-MCNC: 5.9 G/DL (ref 6–8.2)
PROT SERPL-MCNC: 6.2 G/DL (ref 6–8.2)
PROT SERPL-MCNC: 6.4 G/DL (ref 6–8.2)
PROT SERPL-MCNC: 8.2 G/DL (ref 6–8.2)
PROT UR QL STRIP: NEGATIVE MG/DL
PROT UR QL STRIP: NEGATIVE MG/DL
PROTHROMBIN TIME: 19.6 SEC (ref 12–14.6)
PROTHROMBIN TIME: 20.8 SEC (ref 12–14.6)
PROTHROMBIN TIME: 21.1 SEC (ref 12–14.6)
RBC # BLD AUTO: 1.87 M/UL (ref 4.2–5.4)
RBC # BLD AUTO: 2.37 M/UL (ref 4.2–5.4)
RBC # BLD AUTO: 2.43 M/UL (ref 4.2–5.4)
RBC # BLD AUTO: 2.44 M/UL (ref 4.2–5.4)
RBC # BLD AUTO: 2.46 M/UL (ref 4.2–5.4)
RBC # BLD AUTO: 2.49 M/UL (ref 4.2–5.4)
RBC # BLD AUTO: 2.49 M/UL (ref 4.2–5.4)
RBC # BLD AUTO: 2.51 M/UL (ref 4.2–5.4)
RBC # BLD AUTO: 3.58 M/UL (ref 4.2–5.4)
RBC # FLD: 2000 CELLS/UL
RBC # FLD: NORMAL CELLS/UL
RBC # URNS HPF: ABNORMAL /HPF
RBC # URNS HPF: ABNORMAL /HPF
RBC BLD AUTO: PRESENT
RBC UR QL AUTO: ABNORMAL
RBC UR QL AUTO: NEGATIVE
RETINYL PALMITATE SERPL-MCNC: <0.02 MG/L (ref 0–0.1)
RH BLD: ABNORMAL
RH BLD: ABNORMAL
SAO2 % BLDA: 93 % (ref 93–99)
SAO2 % BLDA: 93 % (ref 93–99)
SAO2 % BLDA: 95 % (ref 93–99)
SAO2 % BLDA: 98 % (ref 93–99)
SAO2 % BLDA: 99 % (ref 93–99)
SAO2 % BLDV: 41 %
SCHISTOCYTES BLD QL SMEAR: NORMAL
SIGNIFICANT IND 70042: NORMAL
SITE SITE: NORMAL
SMA IGG SER-ACNC: 33 UNITS (ref 0–19)
SMOOTH MUSCLE IGG TITR SER: ABNORMAL {TITER}
SMUDGE CELLS BLD QL SMEAR: NORMAL
SODIUM SERPL-SCNC: 133 MMOL/L (ref 135–145)
SODIUM SERPL-SCNC: 141 MMOL/L (ref 135–145)
SODIUM SERPL-SCNC: 146 MMOL/L (ref 135–145)
SODIUM SERPL-SCNC: 146 MMOL/L (ref 135–145)
SODIUM SERPL-SCNC: 152 MMOL/L (ref 135–145)
SODIUM SERPL-SCNC: 153 MMOL/L (ref 135–145)
SODIUM SERPL-SCNC: 154 MMOL/L (ref 135–145)
SODIUM SERPL-SCNC: 155 MMOL/L (ref 135–145)
SODIUM SERPL-SCNC: 155 MMOL/L (ref 135–145)
SODIUM SERPL-SCNC: 156 MMOL/L (ref 135–145)
SODIUM UR-SCNC: 112 MMOL/L
SOURCE SOURCE: NORMAL
SP GR UR STRIP.AUTO: 1.02
SP GR UR STRIP.AUTO: 1.02
SPECIMEN DRAWN FROM PATIENT: ABNORMAL
SPECIMEN DRAWN FROM PATIENT: NORMAL
SPHEROCYTES BLD QL SMEAR: NORMAL
STAT TRANS INVEST 8506STI: ABNORMAL
T4 FREE SERPL-MCNC: 0.83 NG/DL (ref 0.53–1.43)
TARGETS BLD QL SMEAR: NORMAL
TEG ALGORITHM TGALG: ABNORMAL
TEG ALGORITHM TGALG: ABNORMAL
TIBC SERPL-MCNC: 119 UG/DL (ref 250–450)
TRIGL SERPL-MCNC: 60 MG/DL (ref 0–149)
TRIGL SERPL-MCNC: 91 MG/DL (ref 0–149)
TSH SERPL DL<=0.005 MIU/L-ACNC: 1.45 UIU/ML (ref 0.38–5.33)
TSH SERPL DL<=0.005 MIU/L-ACNC: 5.76 UIU/ML (ref 0.38–5.33)
TTG IGA SER IA-ACNC: 3 U/ML (ref 0–3)
UNIT STATUS USTAT: ABNORMAL
UNIT STATUS USTAT: ABNORMAL
UROBILINOGEN UR STRIP.AUTO-MCNC: 0.2 MG/DL
UROBILINOGEN UR STRIP.AUTO-MCNC: 0.2 MG/DL
VARIANT LYMPHS BLD QL SMEAR: NORMAL
VIT A SERPL-MCNC: <0.06 MG/L (ref 0.3–1.2)
VIT B1 BLD-MCNC: 138 NMOL/L (ref 70–180)
VIT B12 SERPL-MCNC: >1500 PG/ML (ref 211–911)
WBC # BLD AUTO: 13.4 K/UL (ref 4.8–10.8)
WBC # BLD AUTO: 14.9 K/UL (ref 4.8–10.8)
WBC # BLD AUTO: 16.2 K/UL (ref 4.8–10.8)
WBC # BLD AUTO: 17.2 K/UL (ref 4.8–10.8)
WBC # BLD AUTO: 18.1 K/UL (ref 4.8–10.8)
WBC # BLD AUTO: 20.8 K/UL (ref 4.8–10.8)
WBC # BLD AUTO: 21.9 K/UL (ref 4.8–10.8)
WBC # BLD AUTO: 24.6 K/UL (ref 4.8–10.8)
WBC # BLD AUTO: 3.3 K/UL (ref 4.8–10.8)
WBC # FLD: 223 CELLS/UL
WBC # FLD: NORMAL CELLS/UL
WBC #/AREA URNS HPF: ABNORMAL /HPF
WBC #/AREA URNS HPF: ABNORMAL /HPF

## 2019-01-01 PROCEDURE — 80053 COMPREHEN METABOLIC PANEL: CPT

## 2019-01-01 PROCEDURE — 700101 HCHG RX REV CODE 250: Performed by: INTERNAL MEDICINE

## 2019-01-01 PROCEDURE — 87205 SMEAR GRAM STAIN: CPT

## 2019-01-01 PROCEDURE — 84443 ASSAY THYROID STIM HORMONE: CPT

## 2019-01-01 PROCEDURE — 700111 HCHG RX REV CODE 636 W/ 250 OVERRIDE (IP): Performed by: INTERNAL MEDICINE

## 2019-01-01 PROCEDURE — 83735 ASSAY OF MAGNESIUM: CPT

## 2019-01-01 PROCEDURE — 94003 VENT MGMT INPAT SUBQ DAY: CPT

## 2019-01-01 PROCEDURE — 82140 ASSAY OF AMMONIA: CPT

## 2019-01-01 PROCEDURE — 302136 NUTRITION PUMP: Performed by: INTERNAL MEDICINE

## 2019-01-01 PROCEDURE — 700105 HCHG RX REV CODE 258: Performed by: INTERNAL MEDICINE

## 2019-01-01 PROCEDURE — 94667 MNPJ CHEST WALL 1ST: CPT

## 2019-01-01 PROCEDURE — 31500 INSERT EMERGENCY AIRWAY: CPT | Performed by: INTERNAL MEDICINE

## 2019-01-01 PROCEDURE — 71045 X-RAY EXAM CHEST 1 VIEW: CPT

## 2019-01-01 PROCEDURE — 94640 AIRWAY INHALATION TREATMENT: CPT

## 2019-01-01 PROCEDURE — 85027 COMPLETE CBC AUTOMATED: CPT

## 2019-01-01 PROCEDURE — 99223 1ST HOSP IP/OBS HIGH 75: CPT | Performed by: INTERNAL MEDICINE

## 2019-01-01 PROCEDURE — 4410569 US-PARACENTESIS, ABD WITH IMAGING

## 2019-01-01 PROCEDURE — 85347 COAGULATION TIME ACTIVATED: CPT

## 2019-01-01 PROCEDURE — 82784 ASSAY IGA/IGD/IGG/IGM EACH: CPT

## 2019-01-01 PROCEDURE — A9270 NON-COVERED ITEM OR SERVICE: HCPCS | Performed by: INTERNAL MEDICINE

## 2019-01-01 PROCEDURE — 0B938ZZ DRAINAGE OF RIGHT MAIN BRONCHUS, VIA NATURAL OR ARTIFICIAL OPENING ENDOSCOPIC: ICD-10-PCS | Performed by: INTERNAL MEDICINE

## 2019-01-01 PROCEDURE — 88112 CYTOPATH CELL ENHANCE TECH: CPT

## 2019-01-01 PROCEDURE — 302307 BAG FECAL MANAGEMENT TEMPORARY COLLECTION WITH FILTER - SEAL SIGNAL FMS: Performed by: INTERNAL MEDICINE

## 2019-01-01 PROCEDURE — 83550 IRON BINDING TEST: CPT

## 2019-01-01 PROCEDURE — 83540 ASSAY OF IRON: CPT

## 2019-01-01 PROCEDURE — 87641 MR-STAPH DNA AMP PROBE: CPT

## 2019-01-01 PROCEDURE — 05HY33Z INSERTION OF INFUSION DEVICE INTO UPPER VEIN, PERCUTANEOUS APPROACH: ICD-10-PCS | Performed by: INTERNAL MEDICINE

## 2019-01-01 PROCEDURE — 99292 CRITICAL CARE ADDL 30 MIN: CPT | Mod: 25 | Performed by: INTERNAL MEDICINE

## 2019-01-01 PROCEDURE — 86900 BLOOD TYPING SEROLOGIC ABO: CPT

## 2019-01-01 PROCEDURE — 700111 HCHG RX REV CODE 636 W/ 250 OVERRIDE (IP): Performed by: HOSPITALIST

## 2019-01-01 PROCEDURE — 99233 SBSQ HOSP IP/OBS HIGH 50: CPT | Performed by: HOSPITALIST

## 2019-01-01 PROCEDURE — 86256 FLUORESCENT ANTIBODY TITER: CPT

## 2019-01-01 PROCEDURE — 82607 VITAMIN B-12: CPT

## 2019-01-01 PROCEDURE — 80048 BASIC METABOLIC PNL TOTAL CA: CPT

## 2019-01-01 PROCEDURE — 770022 HCHG ROOM/CARE - ICU (200)

## 2019-01-01 PROCEDURE — 85007 BL SMEAR W/DIFF WBC COUNT: CPT

## 2019-01-01 PROCEDURE — 36415 COLL VENOUS BLD VENIPUNCTURE: CPT

## 2019-01-01 PROCEDURE — 700111 HCHG RX REV CODE 636 W/ 250 OVERRIDE (IP)

## 2019-01-01 PROCEDURE — 700102 HCHG RX REV CODE 250 W/ 637 OVERRIDE(OP): Performed by: INTERNAL MEDICINE

## 2019-01-01 PROCEDURE — 85025 COMPLETE CBC W/AUTO DIFF WBC: CPT

## 2019-01-01 PROCEDURE — 85018 HEMOGLOBIN: CPT

## 2019-01-01 PROCEDURE — 86038 ANTINUCLEAR ANTIBODIES: CPT

## 2019-01-01 PROCEDURE — 99291 CRITICAL CARE FIRST HOUR: CPT | Mod: 25 | Performed by: INTERNAL MEDICINE

## 2019-01-01 PROCEDURE — 31720 CLEARANCE OF AIRWAYS: CPT

## 2019-01-01 PROCEDURE — C9113 INJ PANTOPRAZOLE SODIUM, VIA: HCPCS | Performed by: INTERNAL MEDICINE

## 2019-01-01 PROCEDURE — 80061 LIPID PANEL: CPT

## 2019-01-01 PROCEDURE — 36600 WITHDRAWAL OF ARTERIAL BLOOD: CPT

## 2019-01-01 PROCEDURE — 81001 URINALYSIS AUTO W/SCOPE: CPT

## 2019-01-01 PROCEDURE — 500066 HCHG BITE BLOCK, ECT: Performed by: INTERNAL MEDICINE

## 2019-01-01 PROCEDURE — 84132 ASSAY OF SERUM POTASSIUM: CPT

## 2019-01-01 PROCEDURE — 84132 ASSAY OF SERUM POTASSIUM: CPT | Mod: 91

## 2019-01-01 PROCEDURE — 85018 HEMOGLOBIN: CPT | Mod: 91

## 2019-01-01 PROCEDURE — 82105 ALPHA-FETOPROTEIN SERUM: CPT

## 2019-01-01 PROCEDURE — 84100 ASSAY OF PHOSPHORUS: CPT

## 2019-01-01 PROCEDURE — 76937 US GUIDE VASCULAR ACCESS: CPT

## 2019-01-01 PROCEDURE — 700105 HCHG RX REV CODE 258

## 2019-01-01 PROCEDURE — 5A1955Z RESPIRATORY VENTILATION, GREATER THAN 96 CONSECUTIVE HOURS: ICD-10-PCS | Performed by: INTERNAL MEDICINE

## 2019-01-01 PROCEDURE — 86880 COOMBS TEST DIRECT: CPT | Mod: 91

## 2019-01-01 PROCEDURE — 86140 C-REACTIVE PROTEIN: CPT

## 2019-01-01 PROCEDURE — 85610 PROTHROMBIN TIME: CPT

## 2019-01-01 PROCEDURE — 76700 US EXAM ABDOM COMPLETE: CPT

## 2019-01-01 PROCEDURE — 99291 CRITICAL CARE FIRST HOUR: CPT | Performed by: INTERNAL MEDICINE

## 2019-01-01 PROCEDURE — 85610 PROTHROMBIN TIME: CPT | Mod: 91

## 2019-01-01 PROCEDURE — 86901 BLOOD TYPING SEROLOGIC RH(D): CPT

## 2019-01-01 PROCEDURE — 302146: Performed by: INTERNAL MEDICINE

## 2019-01-01 PROCEDURE — 84425 ASSAY OF VITAMIN B-1: CPT

## 2019-01-01 PROCEDURE — 770001 HCHG ROOM/CARE - MED/SURG/GYN PRIV*

## 2019-01-01 PROCEDURE — 0DJ08ZZ INSPECTION OF UPPER INTESTINAL TRACT, VIA NATURAL OR ARTIFICIAL OPENING ENDOSCOPIC: ICD-10-PCS | Performed by: INTERNAL MEDICINE

## 2019-01-01 PROCEDURE — 85384 FIBRINOGEN ACTIVITY: CPT

## 2019-01-01 PROCEDURE — 160035 HCHG PACU - 1ST 60 MINS PHASE I: Performed by: INTERNAL MEDICINE

## 2019-01-01 PROCEDURE — 99232 SBSQ HOSP IP/OBS MODERATE 35: CPT | Performed by: HOSPITALIST

## 2019-01-01 PROCEDURE — 302146: Performed by: HOSPITALIST

## 2019-01-01 PROCEDURE — 306802 SYSTEM FECAL MANAGEMENT CATHETER KIT FLEXI - SEAL: Performed by: INTERNAL MEDICINE

## 2019-01-01 PROCEDURE — 85730 THROMBOPLASTIN TIME PARTIAL: CPT | Mod: 91

## 2019-01-01 PROCEDURE — 99358 PROLONG SERVICE W/O CONTACT: CPT | Performed by: INTERNAL MEDICINE

## 2019-01-01 PROCEDURE — 302978 HCHG BRONCHOSCOPY-DIAGNOSTIC

## 2019-01-01 PROCEDURE — 85014 HEMATOCRIT: CPT

## 2019-01-01 PROCEDURE — 84439 ASSAY OF FREE THYROXINE: CPT

## 2019-01-01 PROCEDURE — 87340 HEPATITIS B SURFACE AG IA: CPT

## 2019-01-01 PROCEDURE — 36430 TRANSFUSION BLD/BLD COMPNT: CPT

## 2019-01-01 PROCEDURE — P9016 RBC LEUKOCYTES REDUCED: HCPCS | Mod: 91

## 2019-01-01 PROCEDURE — 85576 BLOOD PLATELET AGGREGATION: CPT

## 2019-01-01 PROCEDURE — 85014 HEMATOCRIT: CPT | Mod: 91

## 2019-01-01 PROCEDURE — 84145 PROCALCITONIN (PCT): CPT

## 2019-01-01 PROCEDURE — 86706 HEP B SURFACE ANTIBODY: CPT

## 2019-01-01 PROCEDURE — 30233N1 TRANSFUSION OF NONAUTOLOGOUS RED BLOOD CELLS INTO PERIPHERAL VEIN, PERCUTANEOUS APPROACH: ICD-10-PCS | Performed by: INTERNAL MEDICINE

## 2019-01-01 PROCEDURE — 82803 BLOOD GASES ANY COMBINATION: CPT

## 2019-01-01 PROCEDURE — 84134 ASSAY OF PREALBUMIN: CPT

## 2019-01-01 PROCEDURE — 94690 O2 UPTK REST INDIRECT: CPT

## 2019-01-01 PROCEDURE — 86803 HEPATITIS C AB TEST: CPT

## 2019-01-01 PROCEDURE — 302307 BAG FECAL MANAGEMENT TEMPORARY COLLECTION WITH FILTER - SEAL SIGNAL FMS: Performed by: HOSPITALIST

## 2019-01-01 PROCEDURE — 94668 MNPJ CHEST WALL SBSQ: CPT

## 2019-01-01 PROCEDURE — 89051 BODY FLUID CELL COUNT: CPT

## 2019-01-01 PROCEDURE — 82306 VITAMIN D 25 HYDROXY: CPT | Mod: 91

## 2019-01-01 PROCEDURE — 82570 ASSAY OF URINE CREATININE: CPT

## 2019-01-01 PROCEDURE — 770020 HCHG ROOM/CARE - TELE (206)

## 2019-01-01 PROCEDURE — 86923 COMPATIBILITY TEST ELECTRIC: CPT

## 2019-01-01 PROCEDURE — 0W9G3ZZ DRAINAGE OF PERITONEAL CAVITY, PERCUTANEOUS APPROACH: ICD-10-PCS | Performed by: RADIOLOGY

## 2019-01-01 PROCEDURE — 160048 HCHG OR STATISTICAL LEVEL 1-5: Performed by: INTERNAL MEDICINE

## 2019-01-01 PROCEDURE — 0B9H8ZX DRAINAGE OF LUNG LINGULA, VIA NATURAL OR ARTIFICIAL OPENING ENDOSCOPIC, DIAGNOSTIC: ICD-10-PCS | Performed by: INTERNAL MEDICINE

## 2019-01-01 PROCEDURE — 86708 HEPATITIS A ANTIBODY: CPT

## 2019-01-01 PROCEDURE — 36556 INSERT NON-TUNNEL CV CATH: CPT

## 2019-01-01 PROCEDURE — 99214 OFFICE O/P EST MOD 30 MIN: CPT | Performed by: NURSE PRACTITIONER

## 2019-01-01 PROCEDURE — 86850 RBC ANTIBODY SCREEN: CPT

## 2019-01-01 PROCEDURE — 302106 OSTOMY POWDER: Performed by: INTERNAL MEDICINE

## 2019-01-01 PROCEDURE — 31624 DX BRONCHOSCOPE/LAVAGE: CPT | Mod: 51 | Performed by: INTERNAL MEDICINE

## 2019-01-01 PROCEDURE — 82390 ASSAY OF CERULOPLASMIN: CPT

## 2019-01-01 PROCEDURE — 84590 ASSAY OF VITAMIN A: CPT

## 2019-01-01 PROCEDURE — 85379 FIBRIN DEGRADATION QUANT: CPT

## 2019-01-01 PROCEDURE — 82746 ASSAY OF FOLIC ACID SERUM: CPT

## 2019-01-01 PROCEDURE — 94002 VENT MGMT INPAT INIT DAY: CPT

## 2019-01-01 PROCEDURE — 160202 HCHG ENDO MINUTES - 1ST 30 MINS LEVEL 3: Performed by: INTERNAL MEDICINE

## 2019-01-01 PROCEDURE — 84478 ASSAY OF TRIGLYCERIDES: CPT

## 2019-01-01 PROCEDURE — 31645 BRNCHSC W/THER ASPIR 1ST: CPT | Performed by: INTERNAL MEDICINE

## 2019-01-01 PROCEDURE — P9047 ALBUMIN (HUMAN), 25%, 50ML: HCPCS | Performed by: INTERNAL MEDICINE

## 2019-01-01 PROCEDURE — 700101 HCHG RX REV CODE 250: Performed by: HOSPITALIST

## 2019-01-01 PROCEDURE — 87040 BLOOD CULTURE FOR BACTERIA: CPT | Mod: 91

## 2019-01-01 PROCEDURE — 88305 TISSUE EXAM BY PATHOLOGIST: CPT

## 2019-01-01 PROCEDURE — 84300 ASSAY OF URINE SODIUM: CPT

## 2019-01-01 PROCEDURE — 302214 INTUBATION BOX: Performed by: INTERNAL MEDICINE

## 2019-01-01 PROCEDURE — 87070 CULTURE OTHR SPECIMN AEROBIC: CPT

## 2019-01-01 PROCEDURE — 85730 THROMBOPLASTIN TIME PARTIAL: CPT

## 2019-01-01 PROCEDURE — 99233 SBSQ HOSP IP/OBS HIGH 50: CPT | Performed by: INTERNAL MEDICINE

## 2019-01-01 PROCEDURE — 83516 IMMUNOASSAY NONANTIBODY: CPT | Mod: 91

## 2019-01-01 PROCEDURE — 82306 VITAMIN D 25 HYDROXY: CPT

## 2019-01-01 PROCEDURE — 86704 HEP B CORE ANTIBODY TOTAL: CPT

## 2019-01-01 PROCEDURE — 0BH18EZ INSERTION OF ENDOTRACHEAL AIRWAY INTO TRACHEA, VIA NATURAL OR ARTIFICIAL OPENING ENDOSCOPIC: ICD-10-PCS | Performed by: INTERNAL MEDICINE

## 2019-01-01 PROCEDURE — 83880 ASSAY OF NATRIURETIC PEPTIDE: CPT

## 2019-01-01 PROCEDURE — 31500 INSERT EMERGENCY AIRWAY: CPT

## 2019-01-01 PROCEDURE — C1751 CATH, INF, PER/CENT/MIDLINE: HCPCS

## 2019-01-01 PROCEDURE — 94150 VITAL CAPACITY TEST: CPT

## 2019-01-01 PROCEDURE — 87040 BLOOD CULTURE FOR BACTERIA: CPT

## 2019-01-01 PROCEDURE — 36620 INSERTION CATHETER ARTERY: CPT

## 2019-01-01 PROCEDURE — 160002 HCHG RECOVERY MINUTES (STAT): Performed by: INTERNAL MEDICINE

## 2019-01-01 PROCEDURE — 0B978ZZ DRAINAGE OF LEFT MAIN BRONCHUS, VIA NATURAL OR ARTIFICIAL OPENING ENDOSCOPIC: ICD-10-PCS | Performed by: INTERNAL MEDICINE

## 2019-01-01 RX ORDER — LEVOTHYROXINE SODIUM 0.05 MG/1
50 TABLET ORAL
Qty: 90 TAB | Refills: 0 | Status: SHIPPED | OUTPATIENT
Start: 2019-01-01 | End: 2019-01-01 | Stop reason: SDUPTHER

## 2019-01-01 RX ORDER — POTASSIUM CHLORIDE 7.45 MG/ML
10 INJECTION INTRAVENOUS ONCE
Status: COMPLETED | OUTPATIENT
Start: 2019-01-01 | End: 2019-01-01

## 2019-01-01 RX ORDER — LEVOTHYROXINE SODIUM 0.05 MG/1
50 TABLET ORAL
Status: DISCONTINUED | OUTPATIENT
Start: 2019-01-01 | End: 2019-01-01

## 2019-01-01 RX ORDER — MIDODRINE HYDROCHLORIDE 5 MG/1
5 TABLET ORAL
Status: DISCONTINUED | OUTPATIENT
Start: 2019-01-01 | End: 2019-01-01

## 2019-01-01 RX ORDER — SODIUM CHLORIDE, SODIUM LACTATE, POTASSIUM CHLORIDE, CALCIUM CHLORIDE 600; 310; 30; 20 MG/100ML; MG/100ML; MG/100ML; MG/100ML
INJECTION, SOLUTION INTRAVENOUS
Status: COMPLETED
Start: 2019-01-01 | End: 2019-01-01

## 2019-01-01 RX ORDER — POTASSIUM CHLORIDE 14.9 MG/ML
20 INJECTION INTRAVENOUS ONCE
Status: COMPLETED | OUTPATIENT
Start: 2019-01-01 | End: 2019-01-01

## 2019-01-01 RX ORDER — OXYCODONE HYDROCHLORIDE 5 MG/1
5 TABLET ORAL EVERY 4 HOURS PRN
Status: DISCONTINUED | OUTPATIENT
Start: 2019-01-01 | End: 2019-01-01 | Stop reason: HOSPADM

## 2019-01-01 RX ORDER — POTASSIUM CHLORIDE 29.8 MG/ML
40 INJECTION INTRAVENOUS ONCE
Status: COMPLETED | OUTPATIENT
Start: 2019-01-01 | End: 2019-01-01

## 2019-01-01 RX ORDER — LACTULOSE 20 G/30ML
30 SOLUTION ORAL 3 TIMES DAILY
Status: DISCONTINUED | OUTPATIENT
Start: 2019-01-01 | End: 2019-01-01

## 2019-01-01 RX ORDER — MORPHINE SULFATE 10 MG/ML
5 INJECTION, SOLUTION INTRAMUSCULAR; INTRAVENOUS
Status: DISCONTINUED | OUTPATIENT
Start: 2019-01-01 | End: 2019-01-01 | Stop reason: HOSPADM

## 2019-01-01 RX ORDER — ONDANSETRON 4 MG/1
4 TABLET, ORALLY DISINTEGRATING ORAL EVERY 4 HOURS PRN
Status: DISCONTINUED | OUTPATIENT
Start: 2019-01-01 | End: 2019-01-01

## 2019-01-01 RX ORDER — SUCCINYLCHOLINE CHLORIDE 20 MG/ML
100 INJECTION INTRAMUSCULAR; INTRAVENOUS ONCE
Status: COMPLETED | OUTPATIENT
Start: 2019-01-01 | End: 2019-01-01

## 2019-01-01 RX ORDER — SODIUM CHLORIDE, SODIUM LACTATE, POTASSIUM CHLORIDE, CALCIUM CHLORIDE 600; 310; 30; 20 MG/100ML; MG/100ML; MG/100ML; MG/100ML
INJECTION, SOLUTION INTRAVENOUS CONTINUOUS
Status: DISCONTINUED | OUTPATIENT
Start: 2019-01-01 | End: 2019-01-01

## 2019-01-01 RX ORDER — SODIUM CHLORIDE 9 MG/ML
INJECTION, SOLUTION INTRAVENOUS CONTINUOUS
Status: DISPENSED | OUTPATIENT
Start: 2019-01-01 | End: 2019-01-01

## 2019-01-01 RX ORDER — OCTREOTIDE ACETATE 100 UG/ML
50 INJECTION, SOLUTION INTRAVENOUS; SUBCUTANEOUS ONCE
Status: COMPLETED | OUTPATIENT
Start: 2019-01-01 | End: 2019-01-01

## 2019-01-01 RX ORDER — PHENYLEPHRINE HCL IN 0.9% NACL 0.5 MG/5ML
SYRINGE (ML) INTRAVENOUS
Status: COMPLETED
Start: 2019-01-01 | End: 2019-01-01

## 2019-01-01 RX ORDER — POTASSIUM CHLORIDE 7.45 MG/ML
INJECTION INTRAVENOUS
Status: COMPLETED
Start: 2019-01-01 | End: 2019-01-01

## 2019-01-01 RX ORDER — LEVOTHYROXINE SODIUM 0.05 MG/1
50 TABLET ORAL
Qty: 90 TAB | Refills: 0 | Status: SHIPPED | OUTPATIENT
Start: 2019-01-01

## 2019-01-01 RX ORDER — METHYLPREDNISOLONE SODIUM SUCCINATE 125 MG/2ML
62.5 INJECTION, POWDER, LYOPHILIZED, FOR SOLUTION INTRAMUSCULAR; INTRAVENOUS ONCE
Status: COMPLETED | OUTPATIENT
Start: 2019-01-01 | End: 2019-01-01

## 2019-01-01 RX ORDER — SODIUM CHLORIDE 9 MG/ML
INJECTION, SOLUTION INTRAVENOUS
Status: COMPLETED
Start: 2019-01-01 | End: 2019-01-01

## 2019-01-01 RX ORDER — IPRATROPIUM BROMIDE AND ALBUTEROL SULFATE 2.5; .5 MG/3ML; MG/3ML
3 SOLUTION RESPIRATORY (INHALATION)
Status: DISCONTINUED | OUTPATIENT
Start: 2019-01-01 | End: 2019-01-01

## 2019-01-01 RX ORDER — ALBUMIN (HUMAN) 12.5 G/50ML
100 SOLUTION INTRAVENOUS DAILY
Status: DISCONTINUED | OUTPATIENT
Start: 2019-01-01 | End: 2019-01-01

## 2019-01-01 RX ORDER — ONDANSETRON 2 MG/ML
4 INJECTION INTRAMUSCULAR; INTRAVENOUS EVERY 4 HOURS PRN
Status: DISCONTINUED | OUTPATIENT
Start: 2019-01-01 | End: 2019-01-01

## 2019-01-01 RX ORDER — LEVOTHYROXINE SODIUM 0.05 MG/1
25 TABLET ORAL
Qty: 90 TAB | Refills: 0 | Status: SHIPPED | OUTPATIENT
Start: 2019-01-01 | End: 2019-01-01 | Stop reason: SDUPTHER

## 2019-01-01 RX ORDER — LISINOPRIL AND HYDROCHLOROTHIAZIDE 25; 20 MG/1; MG/1
1 TABLET ORAL
Qty: 90 TAB | Refills: 1 | Status: SHIPPED | OUTPATIENT
Start: 2019-01-01 | End: 2019-01-01 | Stop reason: SDUPTHER

## 2019-01-01 RX ORDER — GLYCOPYRROLATE 1 MG/1
1 TABLET ORAL 3 TIMES DAILY PRN
Status: DISCONTINUED | OUTPATIENT
Start: 2019-01-01 | End: 2019-01-01 | Stop reason: HOSPADM

## 2019-01-01 RX ORDER — DIPHENHYDRAMINE HYDROCHLORIDE 50 MG/ML
50 INJECTION INTRAMUSCULAR; INTRAVENOUS ONCE
Status: COMPLETED | OUTPATIENT
Start: 2019-01-01 | End: 2019-01-01

## 2019-01-01 RX ORDER — NOREPINEPHRINE BITARTRATE 1 MG/ML
INJECTION, SOLUTION INTRAVENOUS
Status: ACTIVE
Start: 2019-01-01 | End: 2019-01-01

## 2019-01-01 RX ORDER — MIDAZOLAM HYDROCHLORIDE 1 MG/ML
.5-2 INJECTION INTRAMUSCULAR; INTRAVENOUS PRN
Status: ACTIVE | OUTPATIENT
Start: 2019-01-01 | End: 2019-01-01

## 2019-01-01 RX ORDER — SODIUM CHLORIDE 9 MG/ML
INJECTION, SOLUTION INTRAVENOUS
Status: ACTIVE
Start: 2019-01-01 | End: 2019-01-01

## 2019-01-01 RX ORDER — ERGOCALCIFEROL 1.25 MG/1
50000 CAPSULE ORAL
Qty: 12 CAP | Refills: 0 | Status: SHIPPED | OUTPATIENT
Start: 2019-01-01

## 2019-01-01 RX ORDER — ZINC SULFATE 50(220)MG
220 CAPSULE ORAL EVERY 12 HOURS
Status: DISCONTINUED | OUTPATIENT
Start: 2019-01-01 | End: 2019-01-01

## 2019-01-01 RX ORDER — PANTOPRAZOLE SODIUM 40 MG/10ML
40 INJECTION, POWDER, LYOPHILIZED, FOR SOLUTION INTRAVENOUS 2 TIMES DAILY
Status: DISCONTINUED | OUTPATIENT
Start: 2019-01-01 | End: 2019-01-01

## 2019-01-01 RX ORDER — SODIUM CHLORIDE 9 MG/ML
500 INJECTION, SOLUTION INTRAVENOUS
Status: ACTIVE | OUTPATIENT
Start: 2019-01-01 | End: 2019-01-01

## 2019-01-01 RX ORDER — ONDANSETRON 2 MG/ML
8 INJECTION INTRAMUSCULAR; INTRAVENOUS EVERY 8 HOURS PRN
Status: DISCONTINUED | OUTPATIENT
Start: 2019-01-01 | End: 2019-01-01 | Stop reason: HOSPADM

## 2019-01-01 RX ORDER — POTASSIUM CHLORIDE 7.45 MG/ML
10 INJECTION INTRAVENOUS
Status: COMPLETED | OUTPATIENT
Start: 2019-01-01 | End: 2019-01-01

## 2019-01-01 RX ORDER — PHYTONADIONE 5 MG/1
10 TABLET ORAL DAILY
Status: DISPENSED | OUTPATIENT
Start: 2019-01-01 | End: 2019-01-01

## 2019-01-01 RX ORDER — ZOLPIDEM TARTRATE 5 MG/1
5 TABLET ORAL NIGHTLY PRN
Qty: 30 TAB | Refills: 3 | Status: SHIPPED | OUTPATIENT
Start: 2019-01-01 | End: 2019-01-01

## 2019-01-01 RX ORDER — LORAZEPAM 2 MG/ML
1-2 INJECTION INTRAMUSCULAR
Status: DISCONTINUED | OUTPATIENT
Start: 2019-01-01 | End: 2019-01-01 | Stop reason: HOSPADM

## 2019-01-01 RX ORDER — LORAZEPAM 2 MG/ML
1 CONCENTRATE ORAL
Status: DISCONTINUED | OUTPATIENT
Start: 2019-01-01 | End: 2019-01-01 | Stop reason: HOSPADM

## 2019-01-01 RX ORDER — OMEPRAZOLE 20 MG/1
20 CAPSULE, DELAYED RELEASE ORAL
Qty: 90 CAP | Refills: 1 | Status: SHIPPED | OUTPATIENT
Start: 2019-01-01

## 2019-01-01 RX ORDER — FUROSEMIDE 10 MG/ML
40 INJECTION INTRAMUSCULAR; INTRAVENOUS ONCE
Status: COMPLETED | OUTPATIENT
Start: 2019-01-01 | End: 2019-01-01

## 2019-01-01 RX ORDER — ERGOCALCIFEROL 1.25 MG/1
CAPSULE ORAL
Qty: 12 CAP | Refills: 0 | Status: SHIPPED | OUTPATIENT
Start: 2019-01-01 | End: 2019-01-01 | Stop reason: SDUPTHER

## 2019-01-01 RX ORDER — MIDODRINE HYDROCHLORIDE 5 MG/1
10 TABLET ORAL
Status: DISCONTINUED | OUTPATIENT
Start: 2019-01-01 | End: 2019-01-01

## 2019-01-01 RX ORDER — ETOMIDATE 2 MG/ML
25 INJECTION INTRAVENOUS ONCE
Status: COMPLETED | OUTPATIENT
Start: 2019-01-01 | End: 2019-01-01

## 2019-01-01 RX ORDER — LEVOTHYROXINE SODIUM 0.03 MG/1
25 TABLET ORAL
Qty: 90 TAB | Refills: 0 | Status: SHIPPED | OUTPATIENT
Start: 2019-01-01 | End: 2019-01-01 | Stop reason: SDUPTHER

## 2019-01-01 RX ORDER — GLYCOPYRROLATE 0.2 MG/ML
0.2 INJECTION INTRAMUSCULAR; INTRAVENOUS 3 TIMES DAILY PRN
Status: DISCONTINUED | OUTPATIENT
Start: 2019-01-01 | End: 2019-01-01 | Stop reason: HOSPADM

## 2019-01-01 RX ORDER — FUROSEMIDE 10 MG/ML
40 INJECTION INTRAMUSCULAR; INTRAVENOUS
Status: DISCONTINUED | OUTPATIENT
Start: 2019-01-01 | End: 2019-01-01

## 2019-01-01 RX ORDER — ONDANSETRON 4 MG/1
8 TABLET, ORALLY DISINTEGRATING ORAL EVERY 8 HOURS PRN
Status: DISCONTINUED | OUTPATIENT
Start: 2019-01-01 | End: 2019-01-01 | Stop reason: HOSPADM

## 2019-01-01 RX ORDER — POTASSIUM CHLORIDE 20 MEQ/1
40 TABLET, EXTENDED RELEASE ORAL ONCE
Status: COMPLETED | OUTPATIENT
Start: 2019-01-01 | End: 2019-01-01

## 2019-01-01 RX ORDER — LISINOPRIL AND HYDROCHLOROTHIAZIDE 25; 20 MG/1; MG/1
1 TABLET ORAL
Qty: 90 TAB | Refills: 1 | Status: SHIPPED | OUTPATIENT
Start: 2019-01-01

## 2019-01-01 RX ORDER — IPRATROPIUM BROMIDE AND ALBUTEROL SULFATE 2.5; .5 MG/3ML; MG/3ML
3 SOLUTION RESPIRATORY (INHALATION)
Status: DISCONTINUED | OUTPATIENT
Start: 2019-01-01 | End: 2019-01-01 | Stop reason: HOSPADM

## 2019-01-01 RX ORDER — DEXMEDETOMIDINE HYDROCHLORIDE 4 UG/ML
.1-1.5 INJECTION, SOLUTION INTRAVENOUS CONTINUOUS
Status: DISCONTINUED | OUTPATIENT
Start: 2019-01-01 | End: 2019-01-01

## 2019-01-01 RX ORDER — SODIUM CHLORIDE, SODIUM LACTATE, POTASSIUM CHLORIDE, AND CALCIUM CHLORIDE .6; .31; .03; .02 G/100ML; G/100ML; G/100ML; G/100ML
1000 INJECTION, SOLUTION INTRAVENOUS ONCE
Status: COMPLETED | OUTPATIENT
Start: 2019-01-01 | End: 2019-01-01

## 2019-01-01 RX ORDER — PHYTONADIONE 5 MG/1
10 TABLET ORAL DAILY
Status: DISCONTINUED | OUTPATIENT
Start: 2019-01-01 | End: 2019-01-01

## 2019-01-01 RX ADMIN — LACTULOSE 30 ML: 20 SOLUTION ORAL at 12:02

## 2019-01-01 RX ADMIN — CHLOROTHIAZIDE SODIUM 500 MG: 500 INJECTION, POWDER, LYOPHILIZED, FOR SOLUTION INTRAVENOUS at 09:10

## 2019-01-01 RX ADMIN — MORPHINE SULFATE 5 MG: 10 INJECTION INTRAVENOUS at 11:59

## 2019-01-01 RX ADMIN — DEXMEDETOMIDINE HYDROCHLORIDE 0.2 MCG/KG/HR: 100 INJECTION, SOLUTION INTRAVENOUS at 10:51

## 2019-01-01 RX ADMIN — SODIUM CHLORIDE 8 MG/HR: 9 INJECTION, SOLUTION INTRAVENOUS at 11:19

## 2019-01-01 RX ADMIN — CHLOROTHIAZIDE SODIUM 500 MG: 500 INJECTION, POWDER, LYOPHILIZED, FOR SOLUTION INTRAVENOUS at 15:40

## 2019-01-01 RX ADMIN — NOREPINEPHRINE BITARTRATE 4 MCG/MIN: 1 INJECTION INTRAVENOUS at 15:13

## 2019-01-01 RX ADMIN — FUROSEMIDE 40 MG: 10 INJECTION, SOLUTION INTRAMUSCULAR; INTRAVENOUS at 05:15

## 2019-01-01 RX ADMIN — RIFAXIMIN 550 MG: 550 TABLET ORAL at 17:27

## 2019-01-01 RX ADMIN — POTASSIUM BICARBONATE 50 MEQ: 978 TABLET, EFFERVESCENT ORAL at 17:53

## 2019-01-01 RX ADMIN — PROPOFOL 70 MCG/KG/MIN: 10 INJECTION, EMULSION INTRAVENOUS at 08:12

## 2019-01-01 RX ADMIN — AMPICILLIN SODIUM AND SULBACTAM SODIUM 3 G: 2; 1 INJECTION, POWDER, FOR SOLUTION INTRAMUSCULAR; INTRAVENOUS at 23:29

## 2019-01-01 RX ADMIN — LACTULOSE 30 ML: 20 SOLUTION ORAL at 17:12

## 2019-01-01 RX ADMIN — DEXMEDETOMIDINE HYDROCHLORIDE 0.2 MCG/KG/HR: 100 INJECTION, SOLUTION INTRAVENOUS at 05:19

## 2019-01-01 RX ADMIN — IPRATROPIUM BROMIDE AND ALBUTEROL SULFATE 3 ML: .5; 3 SOLUTION RESPIRATORY (INHALATION) at 06:36

## 2019-01-01 RX ADMIN — MORPHINE SULFATE 5 MG: 10 INJECTION INTRAVENOUS at 17:58

## 2019-01-01 RX ADMIN — POTASSIUM CHLORIDE 10 MEQ: 7.46 INJECTION, SOLUTION INTRAVENOUS at 21:15

## 2019-01-01 RX ADMIN — LORAZEPAM 2 MG: 2 INJECTION INTRAMUSCULAR at 15:22

## 2019-01-01 RX ADMIN — MORPHINE SULFATE 5 MG: 10 INJECTION INTRAVENOUS at 21:13

## 2019-01-01 RX ADMIN — ALTEPLASE 2 MG: 2.2 INJECTION, POWDER, LYOPHILIZED, FOR SOLUTION INTRAVENOUS at 20:49

## 2019-01-01 RX ADMIN — OXYCODONE HYDROCHLORIDE 5 MG: 5 TABLET ORAL at 04:12

## 2019-01-01 RX ADMIN — OMEPRAZOLE 40 MG: KIT at 18:04

## 2019-01-01 RX ADMIN — POTASSIUM BICARBONATE 50 MEQ: 978 TABLET, EFFERVESCENT ORAL at 05:16

## 2019-01-01 RX ADMIN — AMPICILLIN SODIUM AND SULBACTAM SODIUM 3 G: 2; 1 INJECTION, POWDER, FOR SOLUTION INTRAMUSCULAR; INTRAVENOUS at 04:42

## 2019-01-01 RX ADMIN — LORAZEPAM 2 MG: 2 INJECTION INTRAMUSCULAR at 22:31

## 2019-01-01 RX ADMIN — LACTULOSE 30 ML: 20 SOLUTION ORAL at 18:04

## 2019-01-01 RX ADMIN — RIFAXIMIN 550 MG: 550 TABLET ORAL at 18:04

## 2019-01-01 RX ADMIN — AMPICILLIN SODIUM AND SULBACTAM SODIUM 3 G: 2; 1 INJECTION, POWDER, FOR SOLUTION INTRAMUSCULAR; INTRAVENOUS at 17:04

## 2019-01-01 RX ADMIN — MORPHINE SULFATE 5 MG: 10 INJECTION INTRAVENOUS at 12:15

## 2019-01-01 RX ADMIN — IPRATROPIUM BROMIDE AND ALBUTEROL SULFATE 3 ML: .5; 3 SOLUTION RESPIRATORY (INHALATION) at 15:27

## 2019-01-01 RX ADMIN — IPRATROPIUM BROMIDE AND ALBUTEROL SULFATE 3 ML: .5; 3 SOLUTION RESPIRATORY (INHALATION) at 14:29

## 2019-01-01 RX ADMIN — MORPHINE SULFATE 5 MG: 10 INJECTION INTRAVENOUS at 02:44

## 2019-01-01 RX ADMIN — FUROSEMIDE 40 MG: 10 INJECTION, SOLUTION INTRAMUSCULAR; INTRAVENOUS at 15:13

## 2019-01-01 RX ADMIN — POTASSIUM CHLORIDE 10 MEQ: 7.46 INJECTION, SOLUTION INTRAVENOUS at 02:39

## 2019-01-01 RX ADMIN — DEXMEDETOMIDINE HYDROCHLORIDE 0.4 MCG/KG/HR: 100 INJECTION, SOLUTION INTRAVENOUS at 10:15

## 2019-01-01 RX ADMIN — RIFAXIMIN 550 MG: 550 TABLET ORAL at 23:36

## 2019-01-01 RX ADMIN — POTASSIUM CHLORIDE 40 MEQ: 1500 TABLET, EXTENDED RELEASE ORAL at 03:41

## 2019-01-01 RX ADMIN — FENTANYL CITRATE 100 MCG: 50 INJECTION, SOLUTION INTRAMUSCULAR; INTRAVENOUS at 00:34

## 2019-01-01 RX ADMIN — IPRATROPIUM BROMIDE AND ALBUTEROL SULFATE 3 ML: .5; 3 SOLUTION RESPIRATORY (INHALATION) at 03:39

## 2019-01-01 RX ADMIN — POTASSIUM CHLORIDE 20 MEQ: 14.9 INJECTION, SOLUTION INTRAVENOUS at 07:24

## 2019-01-01 RX ADMIN — CHLOROTHIAZIDE SODIUM 500 MG: 500 INJECTION, POWDER, LYOPHILIZED, FOR SOLUTION INTRAVENOUS at 08:43

## 2019-01-01 RX ADMIN — PROPOFOL 20 MCG/KG/MIN: 10 INJECTION, EMULSION INTRAVENOUS at 21:18

## 2019-01-01 RX ADMIN — AMPICILLIN SODIUM AND SULBACTAM SODIUM 3 G: 2; 1 INJECTION, POWDER, FOR SOLUTION INTRAMUSCULAR; INTRAVENOUS at 12:21

## 2019-01-01 RX ADMIN — LORAZEPAM 2 MG: 2 INJECTION INTRAMUSCULAR at 09:18

## 2019-01-01 RX ADMIN — SODIUM CHLORIDE 500 ML: 9 INJECTION, SOLUTION INTRAVENOUS at 20:59

## 2019-01-01 RX ADMIN — MIDODRINE HYDROCHLORIDE 10 MG: 5 TABLET ORAL at 17:53

## 2019-01-01 RX ADMIN — IPRATROPIUM BROMIDE AND ALBUTEROL SULFATE 3 ML: .5; 3 SOLUTION RESPIRATORY (INHALATION) at 19:00

## 2019-01-01 RX ADMIN — FUROSEMIDE 40 MG: 10 INJECTION, SOLUTION INTRAMUSCULAR; INTRAVENOUS at 11:49

## 2019-01-01 RX ADMIN — ZINC SULFATE 220 MG (50 MG) CAPSULE 220 MG: CAPSULE at 17:53

## 2019-01-01 RX ADMIN — AMPICILLIN SODIUM AND SULBACTAM SODIUM 3 G: 2; 1 INJECTION, POWDER, FOR SOLUTION INTRAMUSCULAR; INTRAVENOUS at 18:04

## 2019-01-01 RX ADMIN — LEVOTHYROXINE SODIUM 50 MCG: 50 TABLET ORAL at 05:18

## 2019-01-01 RX ADMIN — POTASSIUM CHLORIDE 20 MEQ: 14.9 INJECTION, SOLUTION INTRAVENOUS at 07:02

## 2019-01-01 RX ADMIN — DIPHENHYDRAMINE HYDROCHLORIDE 50 MG: 50 INJECTION INTRAMUSCULAR; INTRAVENOUS at 07:43

## 2019-01-01 RX ADMIN — OMEPRAZOLE 40 MG: KIT at 05:01

## 2019-01-01 RX ADMIN — FUROSEMIDE 40 MG: 10 INJECTION, SOLUTION INTRAMUSCULAR; INTRAVENOUS at 04:18

## 2019-01-01 RX ADMIN — SODIUM CHLORIDE 8 MG/HR: 9 INJECTION, SOLUTION INTRAVENOUS at 21:38

## 2019-01-01 RX ADMIN — POTASSIUM BICARBONATE 50 MEQ: 978 TABLET, EFFERVESCENT ORAL at 05:18

## 2019-01-01 RX ADMIN — LEVOTHYROXINE SODIUM 50 MCG: 50 TABLET ORAL at 04:42

## 2019-01-01 RX ADMIN — POTASSIUM BICARBONATE 50 MEQ: 978 TABLET, EFFERVESCENT ORAL at 12:21

## 2019-01-01 RX ADMIN — CEFTRIAXONE SODIUM 1 G: 1 INJECTION, POWDER, FOR SOLUTION INTRAMUSCULAR; INTRAVENOUS at 23:16

## 2019-01-01 RX ADMIN — AMPICILLIN SODIUM AND SULBACTAM SODIUM 3 G: 2; 1 INJECTION, POWDER, FOR SOLUTION INTRAMUSCULAR; INTRAVENOUS at 11:17

## 2019-01-01 RX ADMIN — AMPICILLIN SODIUM AND SULBACTAM SODIUM 3 G: 2; 1 INJECTION, POWDER, FOR SOLUTION INTRAMUSCULAR; INTRAVENOUS at 05:01

## 2019-01-01 RX ADMIN — POTASSIUM CHLORIDE 10 MEQ: 7.46 INJECTION, SOLUTION INTRAVENOUS at 21:18

## 2019-01-01 RX ADMIN — NOREPINEPHRINE BITARTRATE 8 MCG/MIN: 1 INJECTION INTRAVENOUS at 09:17

## 2019-01-01 RX ADMIN — AMPICILLIN SODIUM AND SULBACTAM SODIUM 3 G: 2; 1 INJECTION, POWDER, FOR SOLUTION INTRAMUSCULAR; INTRAVENOUS at 00:28

## 2019-01-01 RX ADMIN — RIFAXIMIN 550 MG: 550 TABLET ORAL at 17:53

## 2019-01-01 RX ADMIN — POTASSIUM CHLORIDE 10 MEQ: 7.46 INJECTION, SOLUTION INTRAVENOUS at 03:42

## 2019-01-01 RX ADMIN — ZINC SULFATE 220 MG (50 MG) CAPSULE 220 MG: CAPSULE at 17:47

## 2019-01-01 RX ADMIN — SODIUM CHLORIDE 8 MG/HR: 9 INJECTION, SOLUTION INTRAVENOUS at 00:28

## 2019-01-01 RX ADMIN — AMPICILLIN SODIUM AND SULBACTAM SODIUM 3 G: 2; 1 INJECTION, POWDER, FOR SOLUTION INTRAMUSCULAR; INTRAVENOUS at 05:15

## 2019-01-01 RX ADMIN — PANTOPRAZOLE SODIUM 40 MG: 40 INJECTION, POWDER, LYOPHILIZED, FOR SOLUTION INTRAVENOUS at 05:12

## 2019-01-01 RX ADMIN — PROPOFOL 55 MCG/KG/MIN: 10 INJECTION, EMULSION INTRAVENOUS at 13:26

## 2019-01-01 RX ADMIN — RIFAXIMIN 550 MG: 550 TABLET ORAL at 05:01

## 2019-01-01 RX ADMIN — OCTREOTIDE ACETATE 50 MCG/HR: 200 INJECTION, SOLUTION INTRAVENOUS; SUBCUTANEOUS at 01:06

## 2019-01-01 RX ADMIN — FUROSEMIDE 40 MG: 10 INJECTION, SOLUTION INTRAMUSCULAR; INTRAVENOUS at 05:18

## 2019-01-01 RX ADMIN — IPRATROPIUM BROMIDE AND ALBUTEROL SULFATE 3 ML: .5; 3 SOLUTION RESPIRATORY (INHALATION) at 02:00

## 2019-01-01 RX ADMIN — SODIUM CHLORIDE, POTASSIUM CHLORIDE, SODIUM LACTATE AND CALCIUM CHLORIDE: 600; 310; 30; 20 INJECTION, SOLUTION INTRAVENOUS at 09:35

## 2019-01-01 RX ADMIN — LORAZEPAM 1 MG: 2 INJECTION INTRAMUSCULAR at 09:32

## 2019-01-01 RX ADMIN — RIFAXIMIN 550 MG: 550 TABLET ORAL at 09:50

## 2019-01-01 RX ADMIN — LORAZEPAM 2 MG: 2 INJECTION INTRAMUSCULAR at 12:52

## 2019-01-01 RX ADMIN — AMPICILLIN SODIUM AND SULBACTAM SODIUM 3 G: 2; 1 INJECTION, POWDER, FOR SOLUTION INTRAMUSCULAR; INTRAVENOUS at 10:17

## 2019-01-01 RX ADMIN — POTASSIUM CHLORIDE 10 MEQ: 7.46 INJECTION, SOLUTION INTRAVENOUS at 04:45

## 2019-01-01 RX ADMIN — PANTOPRAZOLE SODIUM 40 MG: 40 INJECTION, POWDER, LYOPHILIZED, FOR SOLUTION INTRAVENOUS at 06:11

## 2019-01-01 RX ADMIN — AMPICILLIN SODIUM AND SULBACTAM SODIUM 3 G: 2; 1 INJECTION, POWDER, FOR SOLUTION INTRAMUSCULAR; INTRAVENOUS at 05:07

## 2019-01-01 RX ADMIN — MORPHINE SULFATE 5 MG: 10 INJECTION INTRAVENOUS at 15:22

## 2019-01-01 RX ADMIN — RIFAXIMIN 550 MG: 550 TABLET ORAL at 19:02

## 2019-01-01 RX ADMIN — IPRATROPIUM BROMIDE AND ALBUTEROL SULFATE 3 ML: .5; 3 SOLUTION RESPIRATORY (INHALATION) at 08:34

## 2019-01-01 RX ADMIN — LACTULOSE 30 ML: 20 SOLUTION ORAL at 12:29

## 2019-01-01 RX ADMIN — LEVOTHYROXINE SODIUM 50 MCG: 50 TABLET ORAL at 05:01

## 2019-01-01 RX ADMIN — MORPHINE SULFATE 5 MG: 10 INJECTION INTRAVENOUS at 19:45

## 2019-01-01 RX ADMIN — MIDODRINE HYDROCHLORIDE 10 MG: 5 TABLET ORAL at 05:30

## 2019-01-01 RX ADMIN — AMPICILLIN SODIUM AND SULBACTAM SODIUM 3 G: 2; 1 INJECTION, POWDER, FOR SOLUTION INTRAMUSCULAR; INTRAVENOUS at 17:28

## 2019-01-01 RX ADMIN — LACTULOSE 30 ML: 20 SOLUTION ORAL at 12:13

## 2019-01-01 RX ADMIN — MIDODRINE HYDROCHLORIDE 10 MG: 5 TABLET ORAL at 17:28

## 2019-01-01 RX ADMIN — PHYTONADIONE 10 MG: 5 TABLET ORAL at 17:46

## 2019-01-01 RX ADMIN — POTASSIUM CHLORIDE 10 MEQ: 7.46 INJECTION, SOLUTION INTRAVENOUS at 20:10

## 2019-01-01 RX ADMIN — Medication 75 MCG/HR: at 10:25

## 2019-01-01 RX ADMIN — IPRATROPIUM BROMIDE AND ALBUTEROL SULFATE 3 ML: .5; 3 SOLUTION RESPIRATORY (INHALATION) at 18:22

## 2019-01-01 RX ADMIN — FENTANYL CITRATE 100 MCG: 50 INJECTION, SOLUTION INTRAMUSCULAR; INTRAVENOUS at 02:02

## 2019-01-01 RX ADMIN — PROPOFOL 65 MCG/KG/MIN: 10 INJECTION, EMULSION INTRAVENOUS at 10:31

## 2019-01-01 RX ADMIN — FUROSEMIDE 40 MG: 10 INJECTION, SOLUTION INTRAMUSCULAR; INTRAVENOUS at 04:43

## 2019-01-01 RX ADMIN — OCTREOTIDE ACETATE 50 MCG: 100 INJECTION, SOLUTION INTRAVENOUS; SUBCUTANEOUS at 00:14

## 2019-01-01 RX ADMIN — PANTOPRAZOLE SODIUM 40 MG: 40 INJECTION, POWDER, LYOPHILIZED, FOR SOLUTION INTRAVENOUS at 05:15

## 2019-01-01 RX ADMIN — POTASSIUM BICARBONATE 50 MEQ: 978 TABLET, EFFERVESCENT ORAL at 12:12

## 2019-01-01 RX ADMIN — SUCCINYLCHOLINE CHLORIDE 100 MG: 20 INJECTION, SOLUTION INTRAMUSCULAR; INTRAVENOUS; PARENTERAL at 05:31

## 2019-01-01 RX ADMIN — GLYCOPYRROLATE 0.2 MG: 0.2 INJECTION INTRAMUSCULAR; INTRAVENOUS at 00:41

## 2019-01-01 RX ADMIN — POTASSIUM CHLORIDE 10 MEQ: 7.46 INJECTION, SOLUTION INTRAVENOUS at 01:44

## 2019-01-01 RX ADMIN — MORPHINE SULFATE 5 MG: 10 INJECTION INTRAVENOUS at 17:31

## 2019-01-01 RX ADMIN — POTASSIUM CHLORIDE 20 MEQ: 14.9 INJECTION, SOLUTION INTRAVENOUS at 19:06

## 2019-01-01 RX ADMIN — DEXMEDETOMIDINE HYDROCHLORIDE 0.1 MCG/KG/HR: 100 INJECTION, SOLUTION INTRAVENOUS at 04:41

## 2019-01-01 RX ADMIN — POTASSIUM CHLORIDE 10 MEQ: 7.46 INJECTION, SOLUTION INTRAVENOUS at 08:10

## 2019-01-01 RX ADMIN — ETOMIDATE 25 MG: 2 INJECTION INTRAVENOUS at 05:30

## 2019-01-01 RX ADMIN — POTASSIUM BICARBONATE 50 MEQ: 978 TABLET, EFFERVESCENT ORAL at 12:29

## 2019-01-01 RX ADMIN — SODIUM CHLORIDE, POTASSIUM CHLORIDE, SODIUM LACTATE AND CALCIUM CHLORIDE 1000 ML: 600; 310; 30; 20 INJECTION, SOLUTION INTRAVENOUS at 18:13

## 2019-01-01 RX ADMIN — POTASSIUM BICARBONATE 50 MEQ: 978 TABLET, EFFERVESCENT ORAL at 05:01

## 2019-01-01 RX ADMIN — LACTULOSE 30 ML: 20 SOLUTION ORAL at 05:16

## 2019-01-01 RX ADMIN — SODIUM CHLORIDE 80 MG: 9 INJECTION, SOLUTION INTRAVENOUS at 00:01

## 2019-01-01 RX ADMIN — OMEPRAZOLE 40 MG: KIT at 17:53

## 2019-01-01 RX ADMIN — PREDNISOLONE 40 MG: 15 SOLUTION ORAL at 03:41

## 2019-01-01 RX ADMIN — IPRATROPIUM BROMIDE AND ALBUTEROL SULFATE 3 ML: .5; 3 SOLUTION RESPIRATORY (INHALATION) at 10:37

## 2019-01-01 RX ADMIN — AMPICILLIN SODIUM AND SULBACTAM SODIUM 3 G: 2; 1 INJECTION, POWDER, FOR SOLUTION INTRAMUSCULAR; INTRAVENOUS at 17:13

## 2019-01-01 RX ADMIN — POTASSIUM BICARBONATE 50 MEQ: 978 TABLET, EFFERVESCENT ORAL at 19:02

## 2019-01-01 RX ADMIN — ALBUMIN (HUMAN) 100 G: 12.5 SOLUTION INTRAVENOUS at 17:17

## 2019-01-01 RX ADMIN — POTASSIUM CHLORIDE 20 MEQ: 14.9 INJECTION, SOLUTION INTRAVENOUS at 00:30

## 2019-01-01 RX ADMIN — POTASSIUM CHLORIDE 20 MEQ: 14.9 INJECTION, SOLUTION INTRAVENOUS at 14:10

## 2019-01-01 RX ADMIN — ZINC SULFATE 220 MG (50 MG) CAPSULE 220 MG: CAPSULE at 05:01

## 2019-01-01 RX ADMIN — LACTULOSE 30 ML: 20 SOLUTION ORAL at 05:19

## 2019-01-01 RX ADMIN — MIDODRINE HYDROCHLORIDE 10 MG: 5 TABLET ORAL at 12:39

## 2019-01-01 RX ADMIN — LACTULOSE 30 ML: 20 SOLUTION ORAL at 04:42

## 2019-01-01 RX ADMIN — MORPHINE SULFATE 5 MG: 10 INJECTION INTRAVENOUS at 15:43

## 2019-01-01 RX ADMIN — MORPHINE SULFATE 5 MG: 10 INJECTION INTRAVENOUS at 18:35

## 2019-01-01 RX ADMIN — FENTANYL CITRATE 100 MCG: 50 INJECTION, SOLUTION INTRAMUSCULAR; INTRAVENOUS at 08:07

## 2019-01-01 RX ADMIN — AMPICILLIN SODIUM AND SULBACTAM SODIUM 3 G: 2; 1 INJECTION, POWDER, FOR SOLUTION INTRAMUSCULAR; INTRAVENOUS at 23:53

## 2019-01-01 RX ADMIN — IPRATROPIUM BROMIDE AND ALBUTEROL SULFATE 3 ML: .5; 3 SOLUTION RESPIRATORY (INHALATION) at 22:38

## 2019-01-01 RX ADMIN — POTASSIUM CHLORIDE 10 MEQ: 7.46 INJECTION, SOLUTION INTRAVENOUS at 07:28

## 2019-01-01 RX ADMIN — POTASSIUM BICARBONATE 25 MEQ: 978 TABLET, EFFERVESCENT ORAL at 08:37

## 2019-01-01 RX ADMIN — PROPOFOL 20 MCG/KG/MIN: 10 INJECTION, EMULSION INTRAVENOUS at 07:09

## 2019-01-01 RX ADMIN — LORAZEPAM 2 MG: 2 INJECTION INTRAMUSCULAR at 20:18

## 2019-01-01 RX ADMIN — ZINC SULFATE 220 MG (50 MG) CAPSULE 220 MG: CAPSULE at 18:04

## 2019-01-01 RX ADMIN — POTASSIUM BICARBONATE 50 MEQ: 978 TABLET, EFFERVESCENT ORAL at 18:03

## 2019-01-01 RX ADMIN — POTASSIUM BICARBONATE 50 MEQ: 978 TABLET, EFFERVESCENT ORAL at 11:17

## 2019-01-01 RX ADMIN — MIDODRINE HYDROCHLORIDE 10 MG: 5 TABLET ORAL at 08:37

## 2019-01-01 RX ADMIN — CHLOROTHIAZIDE SODIUM 500 MG: 500 INJECTION, POWDER, LYOPHILIZED, FOR SOLUTION INTRAVENOUS at 09:17

## 2019-01-01 RX ADMIN — IPRATROPIUM BROMIDE AND ALBUTEROL SULFATE 3 ML: .5; 3 SOLUTION RESPIRATORY (INHALATION) at 07:07

## 2019-01-01 RX ADMIN — POTASSIUM CHLORIDE 40 MEQ: 1500 TABLET, EXTENDED RELEASE ORAL at 03:46

## 2019-01-01 RX ADMIN — SODIUM CHLORIDE, SODIUM LACTATE, POTASSIUM CHLORIDE, AND CALCIUM CHLORIDE 1000 ML: .6; .31; .03; .02 INJECTION, SOLUTION INTRAVENOUS at 18:13

## 2019-01-01 RX ADMIN — IPRATROPIUM BROMIDE AND ALBUTEROL SULFATE 3 ML: .5; 3 SOLUTION RESPIRATORY (INHALATION) at 22:52

## 2019-01-01 RX ADMIN — PROPOFOL 30 MCG/KG/MIN: 10 INJECTION, EMULSION INTRAVENOUS at 06:00

## 2019-01-01 RX ADMIN — LACTULOSE 30 ML: 20 SOLUTION ORAL at 11:17

## 2019-01-01 RX ADMIN — LEVOTHYROXINE SODIUM 50 MCG: 50 TABLET ORAL at 05:15

## 2019-01-01 RX ADMIN — LACTULOSE 30 ML: 20 SOLUTION ORAL at 17:46

## 2019-01-01 RX ADMIN — AMPICILLIN SODIUM AND SULBACTAM SODIUM 3 G: 2; 1 INJECTION, POWDER, FOR SOLUTION INTRAMUSCULAR; INTRAVENOUS at 23:30

## 2019-01-01 RX ADMIN — ZINC SULFATE 220 MG (50 MG) CAPSULE 220 MG: CAPSULE at 05:16

## 2019-01-01 RX ADMIN — LACTULOSE 30 ML: 20 SOLUTION ORAL at 12:21

## 2019-01-01 RX ADMIN — LEVOTHYROXINE SODIUM 50 MCG: 50 TABLET ORAL at 13:49

## 2019-01-01 RX ADMIN — IPRATROPIUM BROMIDE AND ALBUTEROL SULFATE 3 ML: .5; 3 SOLUTION RESPIRATORY (INHALATION) at 06:46

## 2019-01-01 RX ADMIN — POTASSIUM CHLORIDE 20 MEQ: 14.9 INJECTION, SOLUTION INTRAVENOUS at 02:15

## 2019-01-01 RX ADMIN — LACTULOSE 30 ML: 20 SOLUTION ORAL at 05:01

## 2019-01-01 RX ADMIN — LACTULOSE 30 ML: 20 SOLUTION ORAL at 18:24

## 2019-01-01 RX ADMIN — IPRATROPIUM BROMIDE AND ALBUTEROL SULFATE 3 ML: .5; 3 SOLUTION RESPIRATORY (INHALATION) at 11:36

## 2019-01-01 RX ADMIN — ZINC SULFATE 220 MG (50 MG) CAPSULE 220 MG: CAPSULE at 04:42

## 2019-01-01 RX ADMIN — POTASSIUM CHLORIDE 40 MEQ: 29.8 INJECTION, SOLUTION INTRAVENOUS at 12:47

## 2019-01-01 RX ADMIN — PROPOFOL 35 MCG/KG/MIN: 10 INJECTION, EMULSION INTRAVENOUS at 16:26

## 2019-01-01 RX ADMIN — OMEPRAZOLE 40 MG: KIT at 05:18

## 2019-01-01 RX ADMIN — ZINC SULFATE 220 MG (50 MG) CAPSULE 220 MG: CAPSULE at 19:02

## 2019-01-01 RX ADMIN — IPRATROPIUM BROMIDE AND ALBUTEROL SULFATE 3 ML: .5; 3 SOLUTION RESPIRATORY (INHALATION) at 10:13

## 2019-01-01 RX ADMIN — RIFAXIMIN 550 MG: 550 TABLET ORAL at 17:13

## 2019-01-01 RX ADMIN — IPRATROPIUM BROMIDE AND ALBUTEROL SULFATE 3 ML: .5; 3 SOLUTION RESPIRATORY (INHALATION) at 10:18

## 2019-01-01 RX ADMIN — LORAZEPAM 2 MG: 2 INJECTION INTRAMUSCULAR at 17:02

## 2019-01-01 RX ADMIN — RIFAXIMIN 550 MG: 550 TABLET ORAL at 10:36

## 2019-01-01 RX ADMIN — VANCOMYCIN HYDROCHLORIDE 2400 MG: 500 INJECTION, POWDER, LYOPHILIZED, FOR SOLUTION INTRAVENOUS at 10:17

## 2019-01-01 RX ADMIN — IPRATROPIUM BROMIDE AND ALBUTEROL SULFATE 3 ML: .5; 3 SOLUTION RESPIRATORY (INHALATION) at 22:45

## 2019-01-01 RX ADMIN — IPRATROPIUM BROMIDE AND ALBUTEROL SULFATE 3 ML: .5; 3 SOLUTION RESPIRATORY (INHALATION) at 14:11

## 2019-01-01 RX ADMIN — CEFTRIAXONE SODIUM 1 G: 1 INJECTION, POWDER, FOR SOLUTION INTRAMUSCULAR; INTRAVENOUS at 23:36

## 2019-01-01 RX ADMIN — MORPHINE SULFATE 5 MG: 10 INJECTION INTRAVENOUS at 00:14

## 2019-01-01 RX ADMIN — IPRATROPIUM BROMIDE AND ALBUTEROL SULFATE 3 ML: .5; 3 SOLUTION RESPIRATORY (INHALATION) at 21:55

## 2019-01-01 RX ADMIN — PANTOPRAZOLE SODIUM 40 MG: 40 INJECTION, POWDER, LYOPHILIZED, FOR SOLUTION INTRAVENOUS at 04:44

## 2019-01-01 RX ADMIN — AMPICILLIN SODIUM AND SULBACTAM SODIUM 3 G: 2; 1 INJECTION, POWDER, FOR SOLUTION INTRAMUSCULAR; INTRAVENOUS at 17:15

## 2019-01-01 RX ADMIN — LIDOCAINE HYDROCHLORIDE 2 ML: 10 INJECTION, SOLUTION INFILTRATION; PERINEURAL at 06:03

## 2019-01-01 RX ADMIN — AMPICILLIN SODIUM AND SULBACTAM SODIUM 3 G: 2; 1 INJECTION, POWDER, FOR SOLUTION INTRAMUSCULAR; INTRAVENOUS at 12:15

## 2019-01-01 RX ADMIN — FENTANYL CITRATE 100 MCG: 50 INJECTION, SOLUTION INTRAMUSCULAR; INTRAVENOUS at 02:41

## 2019-01-01 RX ADMIN — IPRATROPIUM BROMIDE AND ALBUTEROL SULFATE 3 ML: .5; 3 SOLUTION RESPIRATORY (INHALATION) at 14:48

## 2019-01-01 RX ADMIN — LORAZEPAM 2 MG: 2 INJECTION INTRAMUSCULAR at 18:35

## 2019-01-01 RX ADMIN — POTASSIUM BICARBONATE 50 MEQ: 978 TABLET, EFFERVESCENT ORAL at 09:10

## 2019-01-01 RX ADMIN — ZINC SULFATE 220 MG (50 MG) CAPSULE 220 MG: CAPSULE at 05:19

## 2019-01-01 RX ADMIN — FUROSEMIDE 40 MG: 10 INJECTION, SOLUTION INTRAMUSCULAR; INTRAVENOUS at 18:04

## 2019-01-01 RX ADMIN — MORPHINE SULFATE 5 MG: 10 INJECTION INTRAVENOUS at 05:38

## 2019-01-01 RX ADMIN — FUROSEMIDE 40 MG: 10 INJECTION, SOLUTION INTRAMUSCULAR; INTRAVENOUS at 17:13

## 2019-01-01 RX ADMIN — LACTULOSE 30 ML: 20 SOLUTION ORAL at 13:49

## 2019-01-01 RX ADMIN — POTASSIUM CHLORIDE 20 MEQ: 14.9 INJECTION, SOLUTION INTRAVENOUS at 20:06

## 2019-01-01 RX ADMIN — POTASSIUM CHLORIDE 40 MEQ: 29.8 INJECTION, SOLUTION INTRAVENOUS at 01:35

## 2019-01-01 RX ADMIN — LORAZEPAM 2 MG: 2 INJECTION INTRAMUSCULAR at 18:24

## 2019-01-01 RX ADMIN — FUROSEMIDE 40 MG: 10 INJECTION, SOLUTION INTRAMUSCULAR; INTRAVENOUS at 05:08

## 2019-01-01 RX ADMIN — MORPHINE SULFATE 5 MG: 10 INJECTION INTRAVENOUS at 04:43

## 2019-01-01 RX ADMIN — IPRATROPIUM BROMIDE AND ALBUTEROL SULFATE 3 ML: .5; 3 SOLUTION RESPIRATORY (INHALATION) at 23:14

## 2019-01-01 RX ADMIN — OCTREOTIDE ACETATE 50 MCG/HR: 200 INJECTION, SOLUTION INTRAVENOUS; SUBCUTANEOUS at 01:19

## 2019-01-01 RX ADMIN — POTASSIUM CHLORIDE 10 MEQ: 7.46 INJECTION, SOLUTION INTRAVENOUS at 16:21

## 2019-01-01 RX ADMIN — MORPHINE SULFATE 5 MG: 10 INJECTION INTRAVENOUS at 22:31

## 2019-01-01 RX ADMIN — IPRATROPIUM BROMIDE AND ALBUTEROL SULFATE 3 ML: .5; 3 SOLUTION RESPIRATORY (INHALATION) at 00:19

## 2019-01-01 RX ADMIN — PANTOPRAZOLE SODIUM 40 MG: 40 INJECTION, POWDER, LYOPHILIZED, FOR SOLUTION INTRAVENOUS at 17:13

## 2019-01-01 RX ADMIN — AMPICILLIN SODIUM AND SULBACTAM SODIUM 3 G: 2; 1 INJECTION, POWDER, FOR SOLUTION INTRAMUSCULAR; INTRAVENOUS at 23:01

## 2019-01-01 RX ADMIN — LORAZEPAM 2 MG: 2 INJECTION INTRAMUSCULAR at 01:05

## 2019-01-01 RX ADMIN — OMEPRAZOLE 40 MG: KIT at 19:02

## 2019-01-01 RX ADMIN — LACTULOSE 30 ML: 20 SOLUTION ORAL at 05:49

## 2019-01-01 RX ADMIN — POTASSIUM CHLORIDE 20 MEQ: 14.9 INJECTION, SOLUTION INTRAVENOUS at 07:01

## 2019-01-01 RX ADMIN — MORPHINE SULFATE 5 MG: 10 INJECTION INTRAVENOUS at 09:18

## 2019-01-01 RX ADMIN — AMPICILLIN SODIUM AND SULBACTAM SODIUM 3 G: 2; 1 INJECTION, POWDER, FOR SOLUTION INTRAMUSCULAR; INTRAVENOUS at 05:18

## 2019-01-01 RX ADMIN — POTASSIUM CHLORIDE 40 MEQ: 29.8 INJECTION, SOLUTION INTRAVENOUS at 20:53

## 2019-01-01 RX ADMIN — POTASSIUM CHLORIDE 20 MEQ: 14.9 INJECTION, SOLUTION INTRAVENOUS at 06:44

## 2019-01-01 RX ADMIN — POTASSIUM CHLORIDE 20 MEQ: 14.9 INJECTION, SOLUTION INTRAVENOUS at 00:28

## 2019-01-01 RX ADMIN — MORPHINE SULFATE 5 MG: 10 INJECTION INTRAVENOUS at 20:10

## 2019-01-01 RX ADMIN — SODIUM CHLORIDE: 9 INJECTION, SOLUTION INTRAVENOUS at 02:59

## 2019-01-01 RX ADMIN — NOREPINEPHRINE BITARTRATE 1 MCG/MIN: 1 INJECTION INTRAVENOUS at 08:04

## 2019-01-01 RX ADMIN — PANTOPRAZOLE SODIUM 40 MG: 40 INJECTION, POWDER, LYOPHILIZED, FOR SOLUTION INTRAVENOUS at 17:03

## 2019-01-01 RX ADMIN — ZINC SULFATE 220 MG (50 MG) CAPSULE 220 MG: CAPSULE at 17:13

## 2019-01-01 RX ADMIN — MIDODRINE HYDROCHLORIDE 10 MG: 5 TABLET ORAL at 18:03

## 2019-01-01 RX ADMIN — PANTOPRAZOLE SODIUM 40 MG: 40 INJECTION, POWDER, LYOPHILIZED, FOR SOLUTION INTRAVENOUS at 18:00

## 2019-01-01 RX ADMIN — RIFAXIMIN 550 MG: 550 TABLET ORAL at 05:21

## 2019-01-01 RX ADMIN — METHYLPREDNISOLONE SODIUM SUCCINATE 62.5 MG: 125 INJECTION, POWDER, FOR SOLUTION INTRAMUSCULAR; INTRAVENOUS at 07:24

## 2019-01-01 RX ADMIN — AMPICILLIN SODIUM AND SULBACTAM SODIUM 3 G: 2; 1 INJECTION, POWDER, FOR SOLUTION INTRAMUSCULAR; INTRAVENOUS at 12:02

## 2019-01-01 RX ADMIN — MORPHINE SULFATE 5 MG: 10 INJECTION INTRAVENOUS at 17:02

## 2019-01-01 RX ADMIN — RIFAXIMIN 550 MG: 550 TABLET ORAL at 05:15

## 2019-01-01 RX ADMIN — POTASSIUM CHLORIDE 10 MEQ: 7.46 INJECTION, SOLUTION INTRAVENOUS at 15:09

## 2019-01-01 RX ADMIN — ZINC SULFATE 220 MG (50 MG) CAPSULE 220 MG: CAPSULE at 17:28

## 2019-01-01 RX ADMIN — OMEPRAZOLE 40 MG: KIT at 05:16

## 2019-01-01 RX ADMIN — IPRATROPIUM BROMIDE AND ALBUTEROL SULFATE 3 ML: .5; 3 SOLUTION RESPIRATORY (INHALATION) at 03:05

## 2019-01-01 RX ADMIN — POTASSIUM CHLORIDE 10 MEQ: 7.46 INJECTION, SOLUTION INTRAVENOUS at 06:09

## 2019-01-01 RX ADMIN — FENTANYL CITRATE 100 MCG: 50 INJECTION, SOLUTION INTRAMUSCULAR; INTRAVENOUS at 05:51

## 2019-01-01 RX ADMIN — IPRATROPIUM BROMIDE AND ALBUTEROL SULFATE 3 ML: .5; 3 SOLUTION RESPIRATORY (INHALATION) at 18:45

## 2019-01-01 RX ADMIN — POTASSIUM CHLORIDE 20 MEQ: 14.9 INJECTION, SOLUTION INTRAVENOUS at 14:24

## 2019-01-01 RX ADMIN — POTASSIUM CHLORIDE 20 MEQ: 14.9 INJECTION, SOLUTION INTRAVENOUS at 13:00

## 2019-01-01 RX ADMIN — MIDODRINE HYDROCHLORIDE 10 MG: 5 TABLET ORAL at 07:36

## 2019-01-01 RX ADMIN — GLYCOPYRROLATE 0.2 MG: 0.2 INJECTION INTRAMUSCULAR; INTRAVENOUS at 20:17

## 2019-01-01 RX ADMIN — OMEPRAZOLE 40 MG: KIT at 17:27

## 2019-01-01 RX ADMIN — RIFAXIMIN 550 MG: 550 TABLET ORAL at 04:43

## 2019-01-01 RX ADMIN — MIDODRINE HYDROCHLORIDE 10 MG: 5 TABLET ORAL at 12:13

## 2019-01-01 RX ADMIN — MIDODRINE HYDROCHLORIDE 5 MG: 5 TABLET ORAL at 17:46

## 2019-01-01 RX ADMIN — RIFAXIMIN 550 MG: 550 TABLET ORAL at 17:46

## 2019-01-01 RX ADMIN — IPRATROPIUM BROMIDE AND ALBUTEROL SULFATE 3 ML: .5; 3 SOLUTION RESPIRATORY (INHALATION) at 02:39

## 2019-01-01 RX ADMIN — IPRATROPIUM BROMIDE AND ALBUTEROL SULFATE 3 ML: .5; 3 SOLUTION RESPIRATORY (INHALATION) at 02:07

## 2019-01-01 RX ADMIN — POTASSIUM BICARBONATE 50 MEQ: 978 TABLET, EFFERVESCENT ORAL at 17:27

## 2019-01-01 RX ADMIN — MIDODRINE HYDROCHLORIDE 10 MG: 5 TABLET ORAL at 12:29

## 2019-01-01 RX ADMIN — OCTREOTIDE ACETATE 50 MCG/HR: 200 INJECTION, SOLUTION INTRAVENOUS; SUBCUTANEOUS at 00:19

## 2019-01-01 RX ADMIN — MIDODRINE HYDROCHLORIDE 10 MG: 5 TABLET ORAL at 18:04

## 2019-01-01 RX ADMIN — CHLOROTHIAZIDE SODIUM 500 MG: 500 INJECTION, POWDER, LYOPHILIZED, FOR SOLUTION INTRAVENOUS at 15:32

## 2019-01-01 ASSESSMENT — ENCOUNTER SYMPTOMS
SENSORY CHANGE: 0
FALLS: 0
MYALGIAS: 0
CONSTIPATION: 0
FLANK PAIN: 0
HALLUCINATIONS: 0
FEVER: 0
LOSS OF CONSCIOUSNESS: 0
SPEECH CHANGE: 0
TINGLING: 0
VOMITING: 0
WEAKNESS: 1
BLOOD IN STOOL: 0
ROS SKIN COMMENTS: JAUNDICE
DIZZINESS: 0
HEADACHES: 0
BLURRED VISION: 0
CHILLS: 0
SENSORY CHANGE: 0
WEAKNESS: 1
NAUSEA: 0
COUGH: 0
DOUBLE VISION: 0
WHEEZING: 0
PND: 0
TINGLING: 0
BLURRED VISION: 0
FLANK PAIN: 0
SHORTNESS OF BREATH: 0
HEMOPTYSIS: 0
BACK PAIN: 0
SPUTUM PRODUCTION: 0
HEARTBURN: 0
VOMITING: 0
WHEEZING: 0
HEARTBURN: 0
BRUISES/BLEEDS EASILY: 0
SPUTUM PRODUCTION: 0
DIZZINESS: 0
PALPITATIONS: 0
TREMORS: 1
VOMITING: 0
COUGH: 0
NECK PAIN: 0
DEPRESSION: 1
HALLUCINATIONS: 0
DIARRHEA: 0
FALLS: 0
BLOOD IN STOOL: 0
SEIZURES: 0
DIARRHEA: 1
DIZZINESS: 0
NECK PAIN: 0
FLANK PAIN: 0
FOCAL WEAKNESS: 0
MYALGIAS: 0
SHORTNESS OF BREATH: 1
ABDOMINAL PAIN: 0
ROS SKIN COMMENTS: JAUNDICE
INSOMNIA: 0
CHILLS: 0
FEVER: 0
DIARRHEA: 1
FEVER: 0
MEMORY LOSS: 1
NAUSEA: 0
MEMORY LOSS: 1
NAUSEA: 0
BLURRED VISION: 0
FOCAL WEAKNESS: 0
WHEEZING: 0
PALPITATIONS: 0
SPEECH CHANGE: 0
MYALGIAS: 0
SORE THROAT: 0
ABDOMINAL PAIN: 1
PND: 0
BACK PAIN: 0
SEIZURES: 0
HEADACHES: 0
DIAPHORESIS: 0
BLOOD IN STOOL: 0
HEADACHES: 0
PALPITATIONS: 0
LOSS OF CONSCIOUSNESS: 0
TREMORS: 1
CHILLS: 0
COUGH: 0
WEAKNESS: 1
ABDOMINAL PAIN: 0
DEPRESSION: 1
FOCAL WEAKNESS: 0
HEMOPTYSIS: 0
SHORTNESS OF BREATH: 1
SEIZURES: 0
INSOMNIA: 0
NERVOUS/ANXIOUS: 1
NECK PAIN: 0
DOUBLE VISION: 0
SPUTUM PRODUCTION: 0
CONSTIPATION: 0
BACK PAIN: 0
NERVOUS/ANXIOUS: 1

## 2019-01-01 ASSESSMENT — COGNITIVE AND FUNCTIONAL STATUS - GENERAL
PERSONAL GROOMING: TOTAL
EATING MEALS: TOTAL
MOVING FROM LYING ON BACK TO SITTING ON SIDE OF FLAT BED: UNABLE
STANDING UP FROM CHAIR USING ARMS: TOTAL
DRESSING REGULAR UPPER BODY CLOTHING: TOTAL
CLIMB 3 TO 5 STEPS WITH RAILING: TOTAL
SUGGESTED CMS G CODE MODIFIER MOBILITY: CM
SUGGESTED CMS G CODE MODIFIER DAILY ACTIVITY: CN
HELP NEEDED FOR BATHING: TOTAL
TOILETING: TOTAL
MOVING TO AND FROM BED TO CHAIR: UNABLE
WALKING IN HOSPITAL ROOM: TOTAL
TURNING FROM BACK TO SIDE WHILE IN FLAT BAD: A LOT
MOBILITY SCORE: 7
DAILY ACTIVITIY SCORE: 6
DRESSING REGULAR LOWER BODY CLOTHING: TOTAL

## 2019-01-01 ASSESSMENT — PATIENT HEALTH QUESTIONNAIRE - PHQ9
SUM OF ALL RESPONSES TO PHQ9 QUESTIONS 1 AND 2: 0
2. FEELING DOWN, DEPRESSED, IRRITABLE, OR HOPELESS: NOT AT ALL
CLINICAL INTERPRETATION OF PHQ2 SCORE: 0
1. LITTLE INTEREST OR PLEASURE IN DOING THINGS: NOT AT ALL

## 2019-01-01 ASSESSMENT — LIFESTYLE VARIABLES
AVERAGE NUMBER OF DAYS PER WEEK YOU HAVE A DRINK CONTAINING ALCOHOL: 0
HAVE YOU EVER FELT YOU SHOULD CUT DOWN ON YOUR DRINKING: NO
EVER_SMOKED: NEVER
HOW MANY TIMES IN THE PAST YEAR HAVE YOU HAD 5 OR MORE DRINKS IN A DAY: 0
CONSUMPTION TOTAL: NEGATIVE
SUBSTANCE_ABUSE: 1
EVER_SMOKED: NEVER
HAVE PEOPLE ANNOYED YOU BY CRITICIZING YOUR DRINKING: NO
TOTAL SCORE: 0
SUBSTANCE_ABUSE: 0
EVER FELT BAD OR GUILTY ABOUT YOUR DRINKING: NO
TOTAL SCORE: 0
EVER HAD A DRINK FIRST THING IN THE MORNING TO STEADY YOUR NERVES TO GET RID OF A HANGOVER: NO
TOTAL SCORE: 0
EVER_SMOKED: NEVER
EVER_SMOKED: NEVER
ALCOHOL_USE: NO
SUBSTANCE_ABUSE: 0
ON A TYPICAL DAY WHEN YOU DRINK ALCOHOL HOW MANY DRINKS DO YOU HAVE: 0

## 2019-01-01 ASSESSMENT — COPD QUESTIONNAIRES
HAVE YOU SMOKED AT LEAST 100 CIGARETTES IN YOUR ENTIRE LIFE: NO/DON'T KNOW
DURING THE PAST 4 WEEKS HOW MUCH DID YOU FEEL SHORT OF BREATH: NONE/LITTLE OF THE TIME
DURING THE PAST 4 WEEKS HOW MUCH DID YOU FEEL SHORT OF BREATH: NONE/LITTLE OF THE TIME
COPD SCREENING SCORE: 1
COPD SCREENING SCORE: 1
DO YOU EVER COUGH UP ANY MUCUS OR PHLEGM?: NO/ONLY WITH OCCASIONAL COLDS OR INFECTIONS
DO YOU EVER COUGH UP ANY MUCUS OR PHLEGM?: NO/ONLY WITH OCCASIONAL COLDS OR INFECTIONS
HAVE YOU SMOKED AT LEAST 100 CIGARETTES IN YOUR ENTIRE LIFE: NO/DON'T KNOW

## 2019-01-01 ASSESSMENT — PULMONARY FUNCTION TESTS: FVC: 1.2

## 2019-03-26 NOTE — TELEPHONE ENCOUNTER
Was the patient seen in the last year in this department? No     Does patient have an active prescription for medications requested? No     Received Request Via: Pharmacy     Last OV 8/3/17, last labs 3/10/17, no OV scheduled

## 2019-03-27 NOTE — TELEPHONE ENCOUNTER
Was the patient seen in the last year in this department? No     Does patient have an active prescription for medications requested? No     Received Request Via: Pharmacy     Last OV 8/3/17  Labs 3/10/17    I called patient to inform her that she will receive no further refills without scheduling an appointment.  I scheduled her an apt with  on 5/31/19.  Can patient get a refill until her apt?

## 2019-04-18 NOTE — TELEPHONE ENCOUNTER
Was the patient seen in the last year in this department? No     Does patient have an active prescription for medications requested? No     Received Request Via: Pharmacy     Last OV 8/3/17, last labs 3/10/17, OV scheduled for 5/24/19

## 2019-06-07 PROBLEM — R79.89 ELEVATED TSH: Status: RESOLVED | Noted: 2017-08-03 | Resolved: 2019-01-01

## 2019-06-07 PROBLEM — R74.8 ELEVATED LIVER ENZYMES: Status: ACTIVE | Noted: 2019-01-01

## 2019-06-07 PROBLEM — D69.6 DECREASED PLATELET COUNT (HCC): Status: ACTIVE | Noted: 2019-01-01

## 2019-06-07 NOTE — ASSESSMENT & PLAN NOTE
Abnormal CBC, low white count red blood cell platelets.  Will refer to hematology for further evaluation.  Will repeat CBC.    Component      Latest Ref Rng & Units 3/10/2017 5/20/2019           8:25 AM  9:19 AM   WBC      4.8 - 10.8 K/uL 3.5 (L) 3.3 (L)   RBC      4.20 - 5.40 M/uL 4.03 (L) 3.58 (L)   Hemoglobin      12.0 - 16.0 g/dL 13.1 12.6   Hematocrit      37.0 - 47.0 % 40.3 38.6   MCV      81.4 - 97.8 fL 100.0 (H) 107.8 (H)   MCH      27.0 - 33.0 pg 32.5 35.2 (H)   MCHC      33.6 - 35.0 g/dL 32.5 (L) 32.6 (L)   RDW      35.9 - 50.0 fL 42.0 46.7   Platelet Count      164 - 446 K/uL 253 127 (L)   MPV      9.0 - 12.9 fL 9.2 10.7   Neutrophils-Polys      44.00 - 72.00 % 46.80    Lymphocytes      22.00 - 41.00 % 43.30 (H)    Monocytes      0.00 - 13.40 % 7.40    Eosinophils      0.00 - 6.90 % 0.80    Basophils      0.00 - 1.80 % 1.40    Immature Granulocytes      0.00 - 0.90 % 0.30    Nucleated RBC      /100 WBC 0.00    Neutrophils (Absolute)      2.00 - 7.15 K/uL 1.65 (L)    Lymphs (Absolute)      1.00 - 4.80 K/uL 1.53    Monos (Absolute)      0.00 - 0.85 K/uL 0.26    Eos (Absolute)      0.00 - 0.51 K/uL 0.03    Baso (Absolute)      0.00 - 0.12 K/uL 0.05    Immature Granulocytes (abs)      0.00 - 0.11 K/uL 0.01    NRBC (Absolute)      K/uL 0.00

## 2019-06-07 NOTE — ASSESSMENT & PLAN NOTE
Chronic health problem.  Taking levothyroxine 25 mcg daily  Denies skin or hair changes, brain fog, fatigue, constipation.TSH is elevated.  Will increase levothyroxine to 50 mcg daily.    Component      Latest Ref Rng & Units 3/10/2017 3/10/2017 5/20/2019 5/20/2019           8:25 AM  8:25 AM  9:19 AM  9:19 AM   TSH      0.380 - 5.330 uIU/mL 4.080 (H)  5.760 (H)    Free T-4      0.53 - 1.43 ng/dL  0.59  0.83

## 2019-06-07 NOTE — ASSESSMENT & PLAN NOTE
Elevated liver enzymes.  Patient reports she is drinking approximately 15 ounces of wine nightly.  Denies abdominal pain, bowel changes.  Advised patient to decrease alcohol intake or to stop drinking alcohol as this is potentially damaging her liver.  Patient is determined to stay healthy and will stop alcohol intake.  Will recheck liver enzymes in 3 months.    Component      Latest Ref Rng & Units 3/10/2017 5/20/2019           8:25 AM  9:19 AM   AST(SGOT)      12 - 45 U/L 48 (H) 330 (H)   ALT(SGPT)      2 - 50 U/L 42 141 (H)   Alkaline Phosphatase      30 - 99 U/L 53 156 (H)

## 2019-06-07 NOTE — ASSESSMENT & PLAN NOTE
This is a chronic health problem that is uncontrolled with current lifestyle measures. LFT's also elevated. Lifestyle measures reviewed including weight loss, routine aerobic exercise, low-fat diet.    Component      Latest Ref Rng & Units 3/10/2017 5/20/2019           8:25 AM  9:19 AM   Cholesterol,Tot      100 - 199 mg/dL 234 (H) 303 (H)   Triglycerides      0 - 149 mg/dL 359 (H) 60   HDL      >=40 mg/dL 54 115   LDL      <100 mg/dL 108 (H) 176 (H)

## 2019-06-07 NOTE — ASSESSMENT & PLAN NOTE
This is a chronic health problem that is well controlled with current medications and lifestyle measures. Takes Vitamin D 50,000 units once a week.    Component      Latest Ref Rng & Units 3/10/2017 5/20/2019           8:25 AM  9:19 AM   25-Hydroxy   Vitamin D 25      30 - 100 ng/mL 52 77

## 2019-06-07 NOTE — ASSESSMENT & PLAN NOTE
This is a chronic health problem that is well controlled with current medications and lifestyle measures. Patient takes Ambien as needed, which is rarely. Tolerating medication, denies sleepwalking or sleep eating.  Last prescription lasted over 2 years.  Reviewed good sleep hygiene including no caffeine after noon, no screen time an hour before bed, relaxing routine prior to bedtime.

## 2019-06-07 NOTE — ASSESSMENT & PLAN NOTE
This is a chronic health problem that is well controlled with current medications and lifestyle measures.  Taking omeprazole 20 mg daily.  Denies abdominal pain, blood in stool, melena.

## 2019-06-07 NOTE — TELEPHONE ENCOUNTER
1. Caller Name: [facility]                      Call Back Number: [phone]    2. Message: Received a fax from pharmacy requesting clarification on the sig for levothyroxine. Sig states take 0.5 tab every day but Pt states she was told to take a whole tab. Please advise.    3. Patient approves office to leave a detailed voicemail/MyChart message: no

## 2019-06-07 NOTE — PROGRESS NOTES
CC:  To establish care, lab review, hypothyroid, GERD    HISTORY OF THE PRESENT ILLNESS: Patient is a 58 y.o. female. This pleasant patient is here today to establish care and for evaluation management problems.  Patient's previous primary care provider is Dr. Cobos.      Vitamin D deficiency  This is a chronic health problem that is well controlled with current medications and lifestyle measures. Takes Vitamin D 50,000 units once a week.    Component      Latest Ref Rng & Units 3/10/2017 5/20/2019           8:25 AM  9:19 AM   25-Hydroxy   Vitamin D 25      30 - 100 ng/mL 52 77       Hypothyroid  Chronic health problem.  Taking levothyroxine 25 mcg daily  Denies skin or hair changes, brain fog, fatigue, constipation.TSH is elevated.  Will increase levothyroxine to 50 mcg daily.    Component      Latest Ref Rng & Units 3/10/2017 3/10/2017 5/20/2019 5/20/2019           8:25 AM  8:25 AM  9:19 AM  9:19 AM   TSH      0.380 - 5.330 uIU/mL 4.080 (H)  5.760 (H)    Free T-4      0.53 - 1.43 ng/dL  0.59  0.83       GERD (gastroesophageal reflux disease)  This is a chronic health problem that is well controlled with current medications and lifestyle measures.  Taking omeprazole 20 mg daily.  Denies abdominal pain, blood in stool, melena.    HTN (hypertension)  This is a chronic health problem that is well controlled with current medications and lifestyle measures.  Taking lisinopril-hydrochlorothiazide 20-25 mg daily.  Tolerating medication, denies cough or lightheadedness.  Does not monitor blood pressure at home.  The patient denies chest pain, shortness of breath, headache, vision changes, epistaxis, or dyspnea on exertion.  Lifestyle measures reviewed including DASH diet, weight loss.    Elevated liver enzymes  Elevated liver enzymes.  Patient reports she is drinking approximately 15 ounces of wine nightly.  Denies abdominal pain, bowel changes.  Advised patient to decrease alcohol intake or to stop drinking alcohol as  this is potentially damaging her liver.  Patient is determined to stay healthy and will stop alcohol intake.  Will recheck liver enzymes in 3 months.    Component      Latest Ref Rng & Units 3/10/2017 5/20/2019           8:25 AM  9:19 AM   AST(SGOT)      12 - 45 U/L 48 (H) 330 (H)   ALT(SGPT)      2 - 50 U/L 42 141 (H)   Alkaline Phosphatase      30 - 99 U/L 53 156 (H)       Decreased platelet count (HCC)  Abnormal CBC, low white count red blood cell platelets.  Will refer to hematology for further evaluation.  Will repeat CBC.    Component      Latest Ref Rng & Units 3/10/2017 5/20/2019           8:25 AM  9:19 AM   WBC      4.8 - 10.8 K/uL 3.5 (L) 3.3 (L)   RBC      4.20 - 5.40 M/uL 4.03 (L) 3.58 (L)   Hemoglobin      12.0 - 16.0 g/dL 13.1 12.6   Hematocrit      37.0 - 47.0 % 40.3 38.6   MCV      81.4 - 97.8 fL 100.0 (H) 107.8 (H)   MCH      27.0 - 33.0 pg 32.5 35.2 (H)   MCHC      33.6 - 35.0 g/dL 32.5 (L) 32.6 (L)   RDW      35.9 - 50.0 fL 42.0 46.7   Platelet Count      164 - 446 K/uL 253 127 (L)   MPV      9.0 - 12.9 fL 9.2 10.7   Neutrophils-Polys      44.00 - 72.00 % 46.80    Lymphocytes      22.00 - 41.00 % 43.30 (H)    Monocytes      0.00 - 13.40 % 7.40    Eosinophils      0.00 - 6.90 % 0.80    Basophils      0.00 - 1.80 % 1.40    Immature Granulocytes      0.00 - 0.90 % 0.30    Nucleated RBC      /100 WBC 0.00    Neutrophils (Absolute)      2.00 - 7.15 K/uL 1.65 (L)    Lymphs (Absolute)      1.00 - 4.80 K/uL 1.53    Monos (Absolute)      0.00 - 0.85 K/uL 0.26    Eos (Absolute)      0.00 - 0.51 K/uL 0.03    Baso (Absolute)      0.00 - 0.12 K/uL 0.05    Immature Granulocytes (abs)      0.00 - 0.11 K/uL 0.01    NRBC (Absolute)      K/uL 0.00        Dyslipidemia  This is a chronic health problem that is uncontrolled with current lifestyle measures. LFT's also elevated. Lifestyle measures reviewed including weight loss, routine aerobic exercise, low-fat diet.    Component      Latest Ref Rng & Units 3/10/2017  5/20/2019           8:25 AM  9:19 AM   Cholesterol,Tot      100 - 199 mg/dL 234 (H) 303 (H)   Triglycerides      0 - 149 mg/dL 359 (H) 60   HDL      >=40 mg/dL 54 115   LDL      <100 mg/dL 108 (H) 176 (H)       Primary insomnia  This is a chronic health problem that is well controlled with current medications and lifestyle measures. Patient takes Ambien as needed, which is rarely. Tolerating medication, denies sleepwalking or sleep eating.  Last prescription lasted over 2 years.  Reviewed good sleep hygiene including no caffeine after noon, no screen time an hour before bed, relaxing routine prior to bedtime.      Allergies: Shellfish allergy    Current Outpatient Prescriptions Ordered in [facility]   Medication Sig Dispense Refill   • lisinopril-hydrochlorothiazide (PRINZIDE, ZESTORETIC) 20-25 MG per tablet Take 1 Tab by mouth every day. TAKE 1 TAB BY MOUTH EVERY DAY. 90 Tab 1   • vitamin D, Ergocalciferol, (DRISDOL) 13330 units Cap capsule Take 1 Cap by mouth every 7 days. 12 Cap 0   • omeprazole (PRILOSEC) 20 MG delayed-release capsule Take 1 Cap by mouth every day. 90 Cap 1   • zolpidem (AMBIEN) 5 MG Tab Take 1 Tab by mouth at bedtime as needed for Sleep for up to 90 days. 30 Tab 3   • levothyroxine (SYNTHROID) 50 MCG Tab Take 1 Tab by mouth Every morning on an empty stomach. 90 Tab 0   • fluticasone (FLOVENT HFA) 44 MCG/ACT AERO Inhale 2 Puffs by mouth 2 times a day. 1 Inhaler 3   • albuterol (VENTOLIN OR PROVENTIL) 108 (90 BASE) MCG/ACT AERS Inhale 2 Puffs by mouth every 6 hours as needed for Shortness of Breath. 8.5 g 3     No current [facility]-ordered facility-administered medications on file.        Past Medical History:   Diagnosis Date   • Dyslipidemia 5/15/2014    Recently started working out with  - 3 weeks ago ,  Will try lifestyle x 3-4 months    • GERD (gastroesophageal reflux disease)    • Hypertension    • Hypothyroid 5/15/2014    TSH 3.82 - repeat in Sept   • Lateral epicondylitis of  "right elbow 3/17/2017   • Primary insomnia 8/24/2016   • Screening 3/17/2015   • Unspecified vitamin D deficiency 5/15/2014    5 uncorrected. Start 50,000IU daily.        Past Surgical History:   Procedure Laterality Date   • HEMORRHOIDECTOMY         Social History   Substance Use Topics   • Smoking status: Former Smoker     Packs/day: 1.00     Years: 5.00     Types: Cigarettes     Quit date: 5/15/2011   • Smokeless tobacco: Never Used   • Alcohol use 0.5 oz/week     1 Glasses of wine per week      Comment: twice weekly       Family History   Problem Relation Age of Onset   • Hypertension Mother    • Hypertension Father        ROS:  As in HPI, otherwise negative for chest pain, dyspnea, abdominal pain, dysuria, blood in stool, fever          Exam: /68 (BP Location: Left arm, Patient Position: Sitting, BP Cuff Size: Adult)   Pulse 76   Temp 37.1 °C (98.7 °F)   Resp 14   Ht 1.676 m (5' 6\")   Wt 85.7 kg (189 lb)   SpO2 97%  Body mass index is 30.51 kg/m².    General: Alert, pleasant, obese habitus, well nourished, well developed female in NAD  HEENT: Normocephalic. Eyes conjunctiva clear lids without ptosis, pupils equal and reactive to light, ears normal shape and contour, canals are clear bilaterally, tympanic membranes are pearly gray with good light reflex, nasal mucosa without erythema and drainage, oropharynx is without erythema, edema or exudates.   Neck: Supple without bruit. Thyroid is not enlarged.  Pulmonary: Clear to ausculation.  Normal effort. No rales, ronchi, or wheezing.  Cardiovascular: Normal rate and rhythm without murmur. Carotid and radial pulses are intact and equal bilaterally.  No lower extremity edema.  Abdomen: Soft, nontender, nondistended. Normal bowel sounds. Liver and spleen are not palpable  Neurologic: Grossly nonfocal  Lymph: No cervical or supraclavicular lymph nodes are palpable  Skin: Warm and dry.    Musculoskeletal: Normal gait.   Psych: Normal mood and affect. Alert " and oriented. Judgment and insight is normal.    Please note that this dictation was created using voice recognition software. I have made every reasonable attempt to correct obvious errors, but I expect that there are errors of grammar and possibly content that I did not discover before finalizing the note.      Assessment/Plan  1. Vitamin D deficiency  Continue with weekly vitamin D supplementation.  - vitamin D, Ergocalciferol, (DRISDOL) 85200 units Cap capsule; Take 1 Cap by mouth every 7 days.  Dispense: 12 Cap; Refill: 0    2. Hypothyroidism, unspecified type  Will increase levothyroxine to 50 mcg daily.  Patient will have labs rechecked prior to next appointment in 10 to 12 weeks.  - TSH; Future  - levothyroxine (SYNTHROID) 50 MCG Tab; Take 1 Tab by mouth Every morning on an empty stomach.  Dispense: 90 Tab; Refill: 0    3. Gastroesophageal reflux disease, esophagitis presence not specified  Continue with omeprazole and lifestyle measures.  - omeprazole (PRILOSEC) 20 MG delayed-release capsule; Take 1 Cap by mouth every day.  Dispense: 90 Cap; Refill: 1    4. Essential hypertension  Continue with lisinopril-hydrochlorothiazide and lifestyle measures including routine aerobic exercise, weight loss.  - lisinopril-hydrochlorothiazide (PRINZIDE, ZESTORETIC) 20-25 MG per tablet; Take 1 Tab by mouth every day. TAKE 1 TAB BY MOUTH EVERY DAY.  Dispense: 90 Tab; Refill: 1    5. Elevated liver enzymes  Likely due to alcohol intake.  Patient will decrease or eliminate alcohol and will recheck labs in 10 to 12 weeks.  - HEPATIC FUNCTION PANEL; Future    6. Decreased platelet count (HCC)  Would like to refer patient to hematology for further evaluation and management.  - CBC WITH DIFFERENTIAL; Future  - REFERRAL TO HEMATOLOGY ONCOLOGY Referral to? [facility]    7. Dyslipidemia  Reviewed lifestyle measures with patient including weight loss and low-fat diet.  Hesitant to start on statin secondary to elevated  liver enzymes.  Will recheck in 3 months.  - HEPATIC FUNCTION PANEL; Future  -LIPID PROFILE; Future    8. Primary insomnia   reviewed and appropriate.  Patient understands not to drink alcohol when taking Ambien.  - zolpidem (AMBIEN) 5 MG Tab; Take 1 Tab by mouth at bedtime as needed for Sleep for up to 90 days.  Dispense: 30 Tab; Refill: 3      Patient will return to clinic in 3 months for lab review or sooner if needed.

## 2019-06-07 NOTE — ASSESSMENT & PLAN NOTE
This is a chronic health problem that is well controlled with current medications and lifestyle measures.  Taking lisinopril-hydrochlorothiazide 20-25 mg daily.  Tolerating medication, denies cough or lightheadedness.  Does not monitor blood pressure at home.  The patient denies chest pain, shortness of breath, headache, vision changes, epistaxis, or dyspnea on exertion.  Lifestyle measures reviewed including DASH diet, weight loss.

## 2019-06-07 NOTE — PATIENT INSTRUCTIONS
Decrease alcohol intake    Work on weight loss, routine aerobic exercise, and low fat diet    Have fasting labs done shortly before next appointment    Referred to hematology

## 2019-06-18 NOTE — TELEPHONE ENCOUNTER
I received message from hematology.  Recommendations are to get folate and vitamin B12 levels, which could be treated by primary care. Consider referral to hematology pending lab results.    Please call patient and let her know that referral to hematology has been postponed.  I have added 2 other labs to the lab tests she will have done in a couple months.  We will review the results at her next appointment.

## 2019-08-12 NOTE — TELEPHONE ENCOUNTER
Was the patient seen in the last year in this department? Yes    Does patient have an active prescription for medications requested? No     Received Request Via: Pharmacy      Pt met protocol?: Yes   Pt last ov 6/19   TSH   Date Value Ref Range Status   05/20/2019 5.760 (H) 0.380 - 5.330 uIU/mL Final     Comment:     Please note new reference ranges effective 12/14/2017 10:00 AM  Pregnant Females, 1st Trimester  0.050-3.700  Pregnant Females, 2nd Trimester  0.310-4.350  Pregnant Females, 3rd Trimester  0.410-5.180

## 2019-11-15 PROBLEM — I81 PORTAL VEIN THROMBOSIS: Status: ACTIVE | Noted: 2019-01-01

## 2019-11-15 PROBLEM — K70.11 ALCOHOLIC HEPATITIS WITH ASCITES: Status: ACTIVE | Noted: 2019-01-01

## 2019-11-15 PROBLEM — D64.9 ANEMIA: Status: ACTIVE | Noted: 2019-01-01

## 2019-11-15 PROBLEM — K92.2 GI BLEED: Status: ACTIVE | Noted: 2019-01-01

## 2019-11-15 PROBLEM — K70.10 ALCOHOLIC HEPATITIS: Status: ACTIVE | Noted: 2019-01-01

## 2019-11-15 PROBLEM — K76.82 HEPATIC ENCEPHALOPATHY (HCC): Status: ACTIVE | Noted: 2019-01-01

## 2019-11-16 PROBLEM — K76.82 HEPATIC ENCEPHALOPATHY (HCC): Status: RESOLVED | Noted: 2019-01-01 | Resolved: 2019-01-01

## 2019-11-16 PROBLEM — N17.9 AKI (ACUTE KIDNEY INJURY) (HCC): Status: ACTIVE | Noted: 2019-01-01

## 2019-11-16 PROBLEM — E87.6 HYPOKALEMIA: Status: ACTIVE | Noted: 2019-01-01

## 2019-11-16 PROBLEM — D62 ANEMIA ASSOCIATED WITH ACUTE BLOOD LOSS: Status: ACTIVE | Noted: 2019-01-01

## 2019-11-16 PROBLEM — D72.829 LEUKOCYTOSIS: Status: ACTIVE | Noted: 2019-01-01

## 2019-11-16 NOTE — PROGRESS NOTES
2 RN skin check complete with FORREST Cummings.   Devices in place purwick and telemetry box.  Skin assessed under devices - yes.  Confirmed pressure ulcers found on - none.  New potential pressure ulcers noted on - none. Wound consult placed - yes. Pictures taken and uploaded.   Patient has 3+ pitting edema in BLE and BUE. Two skin tears on RUE. Open to air. Patient has scattered scraps and bruises on BUE and BLE. Sacrum is pink, but blanchable. Toes are red, but blanching.   The following interventions in place waffle mattress, pillows provided for support and repositioning, q2 turns, and barrier paste used.

## 2019-11-16 NOTE — PROGRESS NOTES
Pt in bed alert and confused with parents present. No complaints of pain at this time. No signs or symptoms of resp distress noted or reported on RA. Bed in low and locked position,c all button within reach and fall precautions are in place

## 2019-11-16 NOTE — PROGRESS NOTES
This document serves as a transfer acceptance note.  The patient has not physically been seen by a physician at this facility yet.    Patient admitted to [facility] on 11/8 with the patient presented with a hemoglobin of 5.4, end-stage liver disease, encephalopathy.  Was transiently moved to ICU with pressors and TPN apparently.  The patient overall improved after 2 units of packed red cells but then on 11/12 was found to have a portal vein thrombosis and was placed on heparin drip.  She since then had a drop in her hemoglobin and dark stools.  The patient therefore to be transferred for a gastroenterology evaluation and upper endoscopy with possible banding.  The case was discussed with Dr. Estrella from gastroenterology.  The patient's encephalopathy has cleared, she was on hydrocortisone for blood pressure support to be weaned off, is taking p.o.'s currently okay, was told the patient is hemodynamically stable    Accepting RN to call hospitalists on arrival for full orders.  Accepting RN to notify involved subspecialty on arrival.

## 2019-11-16 NOTE — ASSESSMENT & PLAN NOTE
Likely secondary to steroids  Infectious work up - check blood cultures x 2 / UA  R/O SBP - paracentesis requested

## 2019-11-16 NOTE — PROGRESS NOTES
Patient admitted into T714-02. Assumed care at 2135. This pt is AOx2 - patient disoriented to time and situation, patient not able to ambulate, patient incontinent - purwick in place, 0/10 pain. Patient audibly wheezing - paged Dr. Underwood for orders. Patient sating at 98% on 3L nasal cannula. Patient and RN discussed plan of care: questions answered. Labs noted, assessment complete. Tele box in place. Pt is on 3L of O2 via nasal cannula. Call light in place, fall precautions in place, patient educated on importance of calling for assistance. No additional needs at this time. VSS
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
## 2019-11-16 NOTE — PROGRESS NOTES
Bedside report received from night shift RN. Pt alert and confused lying in bed with no complaints of pain. No signs or symptoms of resp distress noted or reported on 2.5 L/min via NC. Pt placed on continuous pulse ox. Pt remains NPO at this time. Pt is incontinent of bowel and bladder. Bed in low and locked position, call button within reach and fall precautions are in place

## 2019-11-16 NOTE — CARE PLAN
Problem: Safety  Goal: Will remain free from injury  Outcome: PROGRESSING AS EXPECTED  Note:   Verified that safety precautions are in place and education provided to pt on fall safety and utilization of call button      Problem: Knowledge Deficit  Goal: Knowledge of disease process/condition, treatment plan, diagnostic tests, and medications will improve  Outcome: PROGRESSING AS EXPECTED  Note:   Pt updated on POC, tests, and medications. Pt verbalizes understanding and has no further questions at this time. Pt educated on calling for any more questions.

## 2019-11-16 NOTE — PROGRESS NOTES
Please release ADT order that is signed and held and page Direct Admit On Call Hospitalist when pt arrives, thank you.

## 2019-11-16 NOTE — PROGRESS NOTES
"Pt transported off floor to US. Monitor room notified. Received a call from US and they report that LR is beeping and they do not know how to fix \"upstream occlusion\". Instructions provided to pause infusion. Albumin will be given when pt returns from paracentesis  "

## 2019-11-16 NOTE — H&P
Hospital Medicine History & Physical Note    Date of Service  11/15/2019    Primary Care Physician  CAITLYN Guzman.    Consultants  ABISAI Estrella    Code Status  Full Code    Chief Complaint  Hepatic encephalopathy and GI bleed    History of Presenting Illness  59 y.o. female with a past medical history of alcohol abuse, end-stage liver disease, GERD, hypothyroidism who was transferred from [facility] on 11/15/2019 with GI bleed and hepatic encephalopathy.  The patient presented to [facility] with symptoms of abdominal distention and swelling.  Initial evaluation revealed a hemoglobin of 7, INR 1.4 and a total bilirubin of 11.6 with elevated liver enzymes concerning for alcoholic liver failure.  She was noted to be profoundly hypotensive for which she was admitted to the ICU and started on IV Levophed and stress dose hydrocortisone.  The patient received 7 days of IV Zosyn and IV Flagyl.  She was started on lactulose, thiamine, folate and multivitamins.  She also was placed on alcohol withdrawal protocol.  The patient had a drop in her hemoglobin to 5.4 for which she was transfused with 2 units of PRBC.  She was started on IV PPI and octreotide.  She underwent a abdominal ultrasound on 11/12/2019 that revealed portal vein thrombosis for which she was started on IV heparin.  The patient later had a drop in her hemoglobin and guaiac positive stools.  She was transferred to [facility] for evaluation by GI.  At this time the patient appears somnolent and confused consistent with hepatic encephalopathy.  She denies any fevers, chills or chest pain.  She does report some shortness of breath and abdominal distention.  She states her last drink was 4 weeks ago.  She denies smoking or illicit drug use.  She denies using any blood thinners.    I reviewed the medical records from the transferring facility which are summarized as follows:    CBC 11/15/2019: WBC 17.5, hemoglobin 7.4, hematocrit 21.8, MCV  97, platelets 136    CMP 11/15/2019: Sodium 142, potassium 3.8, chloride 115, CO2 19, glucose 121, BUN 43, creatinine 1.71, magnesium 2.1, calcium 9, albumin 1.8, alkaline phosphatase 177, AST 77, ALT 22, total bilirubin 7.9,     APTT 77.5, INR 1.9, PT 21.6    Hepatitis panel and HIV negative    CT abdomen pelvis without IV contrast: Nondilated air-fluid levels in the colon with colonic wall thickening.  Differential diagnosis includes infectious or inflammatory colitis.  Nonspecific small/moderate ascites.  Minimal colonic diverticulosis.  Gallbladder stones and sludge with gallbladder wall thickening.  Small left pleural effusion with atelectasis in the left lower lobe.  Small hiatal hernia.  Hepatomegaly with hepatic steatosis.  3.9 cm supraumbilical hernia containing fat and small blood vessels.    Ultrasound abdomen complete: Echogenic liver with mildly enlarged spleen and mild ascites.  No gallstones or duct dilatation.    Ultrasound abdomen duplex: Thrombus in the main portal vein and right and left portal vein branches.    Chest x-ray 11/12/2019: Retrocardiac airspace disease most consistent with atelectasis    2D echo: Left ventricular ejection fraction is greater than 70%.  Left ventricular diastolic function is normal.  The right ventricle cavity size wall thickness and systolic function are normal.  No significant valvular abnormalities    Review of Systems  Review of Systems   Constitutional: Positive for malaise/fatigue. Negative for chills, diaphoresis and fever.   HENT: Negative for hearing loss and sore throat.    Eyes: Negative for blurred vision.   Respiratory: Negative for cough, sputum production, shortness of breath and wheezing.    Cardiovascular: Negative for chest pain, palpitations and leg swelling.   Gastrointestinal: Positive for abdominal pain and melena. Negative for blood in stool, diarrhea, nausea and vomiting.   Genitourinary: Negative for dysuria, flank pain and urgency.    Musculoskeletal: Negative for back pain, joint pain, myalgias and neck pain.   Skin: Negative for rash.   Neurological: Positive for weakness. Negative for dizziness, focal weakness, seizures and headaches.   Endo/Heme/Allergies: Does not bruise/bleed easily.   Psychiatric/Behavioral: Positive for substance abuse. Negative for suicidal ideas.   All other systems reviewed and are negative.      Past Medical History   has a past medical history of Dyslipidemia (5/15/2014), GERD (gastroesophageal reflux disease), Hypertension, Hypothyroid (5/15/2014), Lateral epicondylitis of right elbow (3/17/2017), Primary insomnia (8/24/2016), Screening (3/17/2015), and Unspecified vitamin D deficiency (5/15/2014).    Surgical History   has a past surgical history that includes hemorrhoidectomy.     Family History  family history includes Hypertension in her father and mother.     Social History   reports that she quit smoking about 8 years ago. Her smoking use included cigarettes. She has a 5.00 pack-year smoking history. She has never used smokeless tobacco. She reports current alcohol use of about 0.5 oz of alcohol per week. She reports that she does not use drugs.    Allergies  Allergies   Allergen Reactions   • Shellfish Allergy        Medications  Prior to Admission Medications   Prescriptions Last Dose Informant Patient Reported? Taking?   albuterol (VENTOLIN OR PROVENTIL) 108 (90 BASE) MCG/ACT AERS   No No   Sig: Inhale 2 Puffs by mouth every 6 hours as needed for Shortness of Breath.   fluticasone (FLOVENT HFA) 44 MCG/ACT AERO   No No   Sig: Inhale 2 Puffs by mouth 2 times a day.   levothyroxine (SYNTHROID) 50 MCG Tab   No No   Sig: TAKE 1 TAB BY MOUTH EVERY MORNING ON AN EMPTY STOMACH.   lisinopril-hydrochlorothiazide (PRINZIDE, ZESTORETIC) 20-25 MG per tablet   No No   Sig: Take 1 Tab by mouth every day. TAKE 1 TAB BY MOUTH EVERY DAY.   omeprazole (PRILOSEC) 20 MG delayed-release capsule   No No   Sig: Take 1 Cap by  mouth every day.   vitamin D, Ergocalciferol, (DRISDOL) 01709 units Cap capsule   No No   Sig: Take 1 Cap by mouth every 7 days.      Facility-Administered Medications: None       Physical Exam  Temp:  [37.5 °C (99.5 °F)] 37.5 °C (99.5 °F)  Pulse:  [90] 90  Resp:  [18] 18  BP: (146)/(68) 146/68  SpO2:  [98 %] 98 %    Physical Exam  Vitals signs and nursing note reviewed.   Constitutional:       General: She is not in acute distress.     Appearance: Normal appearance.   HENT:      Head: Normocephalic and atraumatic.      Nose: Nose normal.      Mouth/Throat:      Mouth: Mucous membranes are moist.   Eyes:      General: Scleral icterus present.      Extraocular Movements: Extraocular movements intact.      Conjunctiva/sclera: Conjunctivae normal.      Pupils: Pupils are equal, round, and reactive to light.   Neck:      Musculoskeletal: Normal range of motion and neck supple.   Cardiovascular:      Rate and Rhythm: Normal rate and regular rhythm.      Pulses: Normal pulses.      Heart sounds: Normal heart sounds.   Pulmonary:      Effort: No respiratory distress.      Breath sounds: Wheezing present. No rhonchi or rales.   Abdominal:      General: Bowel sounds are normal. There is distension.      Palpations: Abdomen is soft.      Tenderness: There is no tenderness.   Musculoskeletal: Normal range of motion.         General: No swelling or tenderness.      Right lower leg: Edema present.      Left lower leg: Edema present.   Lymphadenopathy:      Cervical: No cervical adenopathy.   Skin:     General: Skin is warm.      Coloration: Skin is jaundiced and pale.      Findings: No rash.   Neurological:      Mental Status: She is alert.      Cranial Nerves: No cranial nerve deficit.      Motor: No weakness.      Comments: Oriented to person and place  Somnolent  Follows commands  Moves all 4 extremities purposefully   Psychiatric:         Mood and Affect: Mood normal.         Behavior: Behavior normal.         Laboratory:           No results for input(s): ALTSGPT, ASTSGOT, ALKPHOSPHAT, TBILIRUBIN, DBILIRUBIN, GAMMAGT, AMYLASE, LIPASE, ALB, PREALBUMIN, GLUCOSE in the last 72 hours.      No results for input(s): NTPROBNP in the last 72 hours.      No results for input(s): TROPONINT in the last 72 hours.    Urinalysis:    No results found     Imaging:  DX-CHEST-PORTABLE (1 VIEW)   Final Result      1.  Left lower lobe atelectasis or pneumonia.      2.  No right lung consolidation.      3.  Right IJV central line in place.            Assessment/Plan:  I anticipate this patient will require at least two midnights for appropriate medical management, necessitating inpatient admission.    GI bleed- (present on admission)  Assessment & Plan  Concern for variceal bleeding  Continuous cardiac monitoring  Gentle IV fluid hydration with lactated ringer  NPO  Patient is started on IV Protonix and IV octreotide  I have started the patient on elective IV cefotaxime  Monitor H&H every 8 hours, transfuse for hemoglobin less than 7  GI was consulted by the transferring physician      Alcoholic hepatitis- (present on admission)  Assessment & Plan  Discriminant function score 52.1  I will continue the patient on steroids  Patient has been counseled extensively on alcohol cessation      Leukocytosis- (present on admission)  Assessment & Plan  Likely secondary to steroids  Monitor CBC and vitals    Hypokalemia- (present on admission)  Assessment & Plan  Replete with K-Dur 40  Check mag not  Monitor BMP    HERBERTH (acute kidney injury) (HCC)- (present on admission)  Assessment & Plan  Gentle IV fluid hydration with LR  Monitor BMP and assess response  Avoid IV contrast/nephrotoxins/NSAIDs  Dose adjust meds for decreased GFR        Portal vein thrombosis- (present on admission)  Assessment & Plan  Hold IV heparin at this time due to GI bleed      Anemia- (present on admission)  Assessment & Plan  Acute on chronic in the setting of GI bleed and chronic alcohol  abuse  Check iron studies, folate, B12 and TSH  Monitor CBC, transfuse for hemoglobin less than 7        Hepatic encephalopathy (HCC)- (present on admission)  Assessment & Plan  I have started the patient on lactulose and rifaximin  Trend ammonia  Avoid sedatives    Hypothyroidism- (present on admission)  Assessment & Plan  Check TSH  Continue Synthroid 50      VTE prophylaxis: SCD    I spent a total of 35 minutes of non face to face time performing additional research, reviewing medical records from transferring facility, discussing plan of care with other healthcare providers. Start time: 10 00 pm. End time: 10 35 pm

## 2019-11-17 PROBLEM — J96.90 RESPIRATORY FAILURE (HCC): Status: ACTIVE | Noted: 2019-01-01

## 2019-11-17 PROBLEM — J96.01 ACUTE RESPIRATORY FAILURE WITH HYPOXIA (HCC): Status: ACTIVE | Noted: 2019-01-01

## 2019-11-17 PROBLEM — D64.9 ANEMIA: Status: ACTIVE | Noted: 2019-01-01

## 2019-11-17 PROBLEM — K74.60 DECOMPENSATED HEPATIC CIRRHOSIS (HCC): Status: ACTIVE | Noted: 2019-01-01

## 2019-11-17 PROBLEM — K72.90 DECOMPENSATED HEPATIC CIRRHOSIS (HCC): Status: ACTIVE | Noted: 2019-01-01

## 2019-11-17 PROBLEM — J80 ARDS (ADULT RESPIRATORY DISTRESS SYNDROME) (HCC): Status: ACTIVE | Noted: 2019-01-01

## 2019-11-17 PROBLEM — R68.89 RIGORS: Status: ACTIVE | Noted: 2019-01-01

## 2019-11-17 NOTE — ASSESSMENT & PLAN NOTE
Ddx pneumonia, TRALI, aspiration  Continue full mechanical ventilatory support  Follow-up BAL  Continue antibiotics  Lung protective strategies

## 2019-11-17 NOTE — PROGRESS NOTES
Rapid response was called for worsening respiratory distress.  I examined the patient at bedside.  Patient has bilateral crackles on exam.  Chest x-ray reveals moderate pulmonary edema.  I started the patient on IV Lasix.  I will transfer her to the ICU for higher level of care and worsening respiratory distress, will monitor closely.  If she continues to deteriorate we will start her on BiPAP.  I have discussed the case with the Intensivist Dr. Diaz

## 2019-11-17 NOTE — ASSESSMENT & PLAN NOTE
Discriminant function 44.8  Not currently on prednisone secondary to concern of GI bleed  Serial ammonia levels  Minimize hepatotoxic substances  Recommend complete EtOH cessation  Lactulose/rifaximin

## 2019-11-17 NOTE — ASSESSMENT & PLAN NOTE
Anti-coagulation contraindicated, and indeed, not typically indicated in patients with cirrhosis unless there is evidence of gut ischemia

## 2019-11-17 NOTE — RESPIRATORY CARE
Intubation     Intubation respiratory failure  Positive Color Change on EZCap? yes  Difficult Airway no  Number of attempts 1  Evidence of aspiration no  Airway ETT 7.5-Secured At  (cm): 23 (11/17/19 0544)  Oh Vent Mode: APVCMV (11/17/19 0544)     Rate (breaths/min): 20 (11/17/19 0544)  Vt Target (mL): 360 (11/17/19 0544)  FiO2: 100 (11/17/19 0544)  PEEP/CPAP: 8 (11/17/19 0544)       Events/Summary/Plan: Pt intubated in unit (11/17/19 0544)

## 2019-11-17 NOTE — ASSESSMENT & PLAN NOTE
Conservative transfusion strategy  PPI  Completed 6 days abx for sbp prophy  Status post EGD 11/17 with grade 1 esophageal varices   Strong peripheral pulses

## 2019-11-17 NOTE — PROGRESS NOTES
Pt transported back to the floor. Pt alert and confused with no complaints of pain. Occasional abdominal breathing. O2 sat is 93% on RA and pt does not report any SOB. Site to right lower quad ABD is CDI

## 2019-11-17 NOTE — PROGRESS NOTES
[facility] Medicine Daily Progress Note    Date of Service  11/17/2019    Chief Complaint  59 y.o. female admitted 11/15/2019 with altered mentation and GIB    Hospital Course    PMHx ETOH abuse, ESLD, GERD, Hypothyroidism.  [facility].  Hgb 7, INR 1.4, TBili 11.6.  Hypotensive on presentation and admitted to the ICU on Levophed and stress dose Hydrocortisone.  Given 2 units PRBCs after Hgb dropped to 5.6.  Treated with 7 days of Zosyn/Flagyl.  Found to have an PVT and started on IV heparin.  Following this had a drop in Hgb and sent to us.  Intubated 11/17      Interval Problem Update  ***    Consultants/Specialty  ***    Code Status  ***    Disposition  ***    Review of Systems  ROS     Physical Exam  Temp:  [36.8 °C (98.2 °F)-37.4 °C (99.4 °F)] 36.8 °C (98.2 °F)  Pulse:  [] 104  Resp:  [12-20] 18  BP: (112-132)/(54-84) 126/64  SpO2:  [91 %-95 %] 92 %    Physical Exam    Fluids  No intake or output data in the 24 hours ending 11/17/19 0529    Laboratory  Recent Labs     11/15/19  2343 11/16/19  0146 11/16/19  0604   WBC 20.8* 17.2*  --    RBC 2.43* 1.87*  --    HEMOGLOBIN 7.8* 6.2* 8.1*   HEMATOCRIT 24.6* 19.5*  --    .2* 102.1*  --    MCH 32.1 32.6  --    MCHC 31.7* 31.9*  --    RDW 80.0* 79.0*  --    PLATELETCT 158* 137*  --    MPV 10.4 9.8  --      Recent Labs     11/15/19  2343 11/17/19  0146   SODIUM 141 146*   POTASSIUM 3.1* 2.3*   CHLORIDE 110 111   CO2 19* 22   GLUCOSE 139* 139*   BUN 42* 38*   CREATININE 1.52* 1.28   CALCIUM 9.4 9.9     Recent Labs     11/15/19  2343 11/17/19  0146   APTT 42.2* 40.4*   INR 1.61* 1.77*               Imaging  {Wetread or Wildcard:[id number]}     Assessment/Plan  * GI bleed- (present on admission)  Assessment & Plan  In the setting of ETOH hepatitis / Cirrhosis   Suspect EV bleeding  Continue Protonix gtt, octreotide gtt  Continue ceftriaxone for prevention of SBP and 2/2 bleeding   Gentle IVF hydration   Maintain  two large bore IV access / Type and screen at all times   Check TEG with platelet mapping   Monitor HD parameters closely / VS closely   Gastroenterology consultation - Dr Sutherland to evaluate patient - Case discussed with Dr Sutherland   Monitor Hb / Restrictive transfusion strategy  Transfer ICU for any signs of hemodynamic instability    HERBERTH (acute kidney injury) (HCC)- (present on admission)  Assessment & Plan  ? Concern for HRS   Check urine Na, Cr  Check US abdomen  R/O SBP / Infectious concerns   No diuretics for now   Albumin 1g/kg x 2 days   Continue octreotide   Add midodrine   Monitor renal function / Avoid nephrotoxins and dose medications renally    Portal vein thrombosis- (present on admission)  Assessment & Plan  Anticoagulation contraindicated with acute GI bleeding   Gastroenterology consulted   Further recommendations per gastroenterology team   Discussed with Dr Sutherland from gastroenterology 11/16/19    Alcoholic hepatitis with ascites- (present on admission)  Assessment & Plan  Suspect underlying cirrhosis   MELD 11/16/19 -> 24 with a 3 month mortality of 19.6  MELD ETOH Hepatitis 11/16/19 -> 24 with a 3 month mortality of 39%    MDF on presentation to [facility] 11/15/19 ~ 40 - Continue on steroids (Uncertain when these were started)   Will monitor bilirubin / INR for improvement with store (Obtain data / records from OSH to calculate L[place] sore to see if ongoing benefits from corticosteroid - need to continue this?)    Optimize nutrition   GIB management as reviewed   HRS ? Continue management as reviewed   Encephalopathy - Rifaximin, Lactulose and Zinc  Paracentesis requested to r/o SBP   PV thrombosis - Gastroenterology input awaited     Gastroenterology to consult   Defer further recommendations to gastroenterology team    Poor prognosis - high risk of mortality because of accompanying GI bleeding / Concern for HRS and PV thrombosis accompnying current disease process. Discussed with patient / her  parents at bedside. FULL CODE status at this time. Recommend palliative consultation for advance care planning.     Leukocytosis- (present on admission)  Assessment & Plan  Likely secondary to steroids  Infectious work up - check blood cultures x 2 / UA  R/O SBP - paracentesis requested     Hypokalemia- (present on admission)  Assessment & Plan  Monitor / replace PRN     Anemia associated with acute blood loss- (present on admission)  Assessment & Plan  Secondary to acute upper GI bleeding  Monitor Hb / Restrictive transfusion strategy    Hepatic encephalopathy (HCC)- (present on admission)  Assessment & Plan  Continue lactulose, rifaximin and zinc    Hypothyroidism- (present on admission)  Assessment & Plan  Continue Synthroid 50 mcg, TSH was checked on presentation         VTE prophylaxis: ***

## 2019-11-17 NOTE — PROGRESS NOTES
0510 Pt transported to T615 via tele bed, on zoll monitoring by rapid response team.     0532 Intubation complete    0635 MD updated of new hgb results, new orders received, see MAR. MD also updated of persistent coughing and gagging on tube, orders for pressor support to increase sedation at this time.

## 2019-11-17 NOTE — CARE PLAN
Problem: Bronchoconstriction:  Goal: Improve in air movement and diminished wheezing  Outcome: NOT MET   Duoneb Q4. Patient receiving 6 LPM Oxymask.

## 2019-11-17 NOTE — PROCEDURES
"Bronchoscopy procedure note    Date of Service: 11/17/2019    Procedure:  Diagnostic and therapeutic bronchoscopy with BAL    Indication: ARDS, bloody tracheal secretions    Physician:  Dr. Jessica Diaz MD    Post Procedure Diagnosis:  1.  Alveolar hemorrhage  2.  ARDS      Narrative:  Emergent procedure, \"time out\" was performed.  A flexible fiberoptic bronchoscope was then inserted through the ETT without difficulty.  All airways were evaluated to the sub-segmental level.  The airway mucosa was normal.  No endobronchial lesions were seen. Scant clear mucoid secretions were suctioned from the R and L mainstem bronchi. The bronchoscope was then wedged in a segment of the lingula bronchus. 90 cc of saline was instilled with moderate return of 40 BAL fluid.  BAL fluid became progressively more bloody with aspiration. The BAL specimen will be sent for appropriate culture and cell differential.  No immediate complications.  EBL = 0.      Ambreen Diaz M.D.      "

## 2019-11-17 NOTE — PROGRESS NOTES
Patient desating to 88%-90% on 3L nasal cannula. Turned patient's oxygen up to 5L nasal cannula. Sating at 91% on 5L. Listened to patient's lungs. [place] wheezing in bilateral lungs throughout. Patient having increased work of breathing. Called respiratory to come to bedside to give patient a breathing treatment. Respiratory at bedside. Called and talked to Dr. Amol Titus. Orders for chest x-ray, BNP, and to decrease IV fluids placed.

## 2019-11-17 NOTE — PROGRESS NOTES
Patient's oxygen requirement increased. Sating at 87% on 6L nasal cannula. Put oxymask on patient and turned O2 up to 7L. Called rapid nurse to come take a look at patient. Listened to patient's lung sounds. Patient had bilateral crackles and wheezing. Called Dr. Amol Titus. Order for IV lasix placed and given to patient at this time. Rapid response then called for continued worsening respiratory distress. See Rapid flowsheet.

## 2019-11-17 NOTE — ASSESSMENT & PLAN NOTE
Intubated 11/17-21  RT/O2 protocols  Aspiration precaution  Completed course of abx 11/22  Start PEP, may require NT suctioning

## 2019-11-17 NOTE — DIETARY
NUTRITION SERVICES:    Pt is currently intubated on ICU floor. RD received 2 consults for supplements. Spoke with RN, states consults were put in on stepdown floor prior to ICU admit.     RD to follow per policy and will address supplements when appropriate.

## 2019-11-17 NOTE — PROCEDURES
Intubation  Date/Time: 11/17/2019 8:54 AM  Performed by: Ambreen Diaz M.D.  Authorized by: Ambreen Diaz M.D.     Consent:     Consent obtained:  Verbal    Consent given by:  Parent    Risks discussed:  Aspiration, brain injury, dental trauma, laryngeal injury, pneumothorax, hypoxia, death and bleeding  Pre-procedure details:     Patient status:  Altered mental status    Paralytics:  Succinylcholine (etomidate)  Procedure details:     Preoxygenation:  Nonrebreather mask    CPR in progress: no      Intubation method:  Oral    Oral intubation technique:  Direct    Laryngoscope blade:  Mac 4    Cormack-Lehane Classification:  Grade 1    Tube size (mm):  7.5    Tube type:  Cuffed    Number of attempts:  1    Cricoid pressure: no      Tube visualized through cords: yes    Placement assessment:     ETT to teeth:  24    Tube secured with:  ETT edwards    Breath sounds:  Equal and absent over the epigastrium    Placement verification: chest rise, CXR verification, equal breath sounds and ETCO2 detector      CXR findings:  ETT in proper place  Post-procedure details:     Patient tolerance of procedure:  Tolerated well, no immediate complications

## 2019-11-17 NOTE — ASSESSMENT & PLAN NOTE
Trending towards improvement  Renally dose medications minimize nephrotoxic substances  Monitor urine output

## 2019-11-17 NOTE — PROGRESS NOTES
Seen by Dr Diaz this morning on transfer to iCU at 4 am.  Intubated for acute GI bleed and respiratory failure    In summary a 59-year-old female that presented from outside facility.  She came from [facility] for GI bleed and hepatic encephalopathy.  She presented on 15 November.  She did initially presented to [place] with abdominal pain.  She initially had a hemoglobin of 7 that dropped to 5.4 and an INR 1.4 and total bilirubin 11.6 with elevated liver enzymes.  She does drink alcohol and likely this is alcoholic cirrhosis.  She did initially been admitted to the ICU requiring pressors and hydrocortisone and had 7 days of Zosyn and Flagyl.  He had been treated for the alcohol with vitamins.  She was on alcohol withdrawal protocol.  She had received 2 units of PRBCs.  She was on PPI and octreotide.  She does have portal vein thrombosis on ultrasound.  She is on a heparin drip of which was started there which was stopped here because of worsening hemoglobin and GI bleed.  Is in for transfer was evaluation by GI for EGD.  She stopped drinking approximately 4 weeks ago and denied any drug use per the chart history.  Not on any anticoagulation outside.  She was intubated this morning for respiratory failure her x-ray shows edema versus aspiration of the left side.  This is new from the initial x-ray prior to intubation.  She does have some tracheal secretions.  Her blood pressure has been borderline with the propofol and I started levo fed this morning.  She did have a possible transfusion reaction of which a PRBCs was stopped this morning on repeat hemoglobin 6.9 and we have reordered a unit of PRBCs with steroids and Benadryl.    Interval events/rounds  ? Transfusion reaction with prbc, stopped  Repeat hb 6.9  I ordered 1 uprbc with steroids and benadryl for pre therapy  On ventilator  Secretions present  On octreotide and ppi  I started levophed for map > 65 and sbp > 90  Bronch per Dr Diaz for aspiratrion  Moves  all extremities  Sinus tach, improving  Mult bowel movements loose  Gr placed in am  Right ij triple  Prop at 75, fentanyl drip   mcg fent every hour  Peep at 8, 80%, peep 8  Bal pending  No vte px  Variceal bleed likely  unasyn for aspiration, prior 7 days vanc/zosyn  5 days therapy for aspiration    Addendum  egd esophageal varices, esophagitis, gastric erosions, ulcers, diffusion gastropathy, mild duodenitis  Continue octreotide and ppi drips  Hb repeat 7.8, adequate  Tolerated am prbc      General: intubated and sedated  Neuro/Psych: cn 2-12 intact, no focal motor or sensory changes, moves all ext  HEENT: nc/at, no jvd, perrla, no tracheal deviation  CVS: s1, s2 heard, neg murmur noted, sinus tach  Respiratory:moderate air movement, bilateral rhonchi  Abdomen/: soft, nt, no guarding, no rigidity  Extremities: no edema or erythema noted, adequate pulses ext and perfusion  Skin: no rashes or bruising noted      HEMATOLOGY/ ONCOLOGY/ID:            Recent Labs     11/15/19  2343 11/16/19  0604 11/17/19  0146 11/17/19  0550   WBC 20.8*  --  17.2*  --    RBC 2.43*  --  1.87*  --    HEMOGLOBIN 7.8* 8.1* 6.2* 6.9*   HEMATOCRIT 24.6*  --  19.5* 21.8*   .2*  --  102.1*  --    MCH 32.1  --  32.6  --    RDW 80.0*  --  79.0*  --    PLATELETCT 158*  --  137*  --    MPV 10.4  --  9.8  --    NEUTSPOLYS 82.60*  --  80.90*  --    LYMPHOCYTES 13.00*  --  10.40*  --    MONOCYTES 4.40  --  8.70  --    EOSINOPHILS 0.00  --  0.00  --    BASOPHILS 0.00  --  0.00  --    RBCMORPHOLO Present  --  Present  --      Lab Results   Component Value Date    QPNWXNPT02 >1500 (H) 11/15/2019    FOLATE 12.8 11/15/2019    IRON 113 11/15/2019    TOTIRONBC 119 (L) 11/15/2019       RENAL:        Estimated GFR/CRCL = Estimated Creatinine Clearance: 58.9 mL/min (by C-G formula based on SCr of 1.28 mg/dL).  Recent Labs     11/15/19  2343 11/16/19  0604 11/17/19  0146   SODIUM 141  --  146*   POTASSIUM 3.1*  --  2.3*   CHLORIDE 110  --   111   CO2 19*  --  22   BUN 42*  --  38*   CREATININE 1.52*  --  1.28   CALCIUM 9.4  --  9.9   MAGNESIUM  --  2.1 2.1   PHOSPHORUS  --   --  4.0   ALBUMIN 2.6*  --  3.7       GASTROINTESTINAL/ HEPATIC:          Recent Labs     11/15/19  2343 11/17/19  0146   ALTSGPT 26 28   ASTSGOT 69* 64*   ALKPHOSPHAT 149* 99   TBILIRUBIN 9.4* 7.7*   ALBUMIN 2.6* 3.7   GLOBULIN 3.6* 2.7   INR 1.61* 1.77*   MACROCYTOSIS 1+ 1+     Lab Results   Component Value Date    AMMONIA 46 (H) 11/16/2019       ENDOCRINE:              No results for input(s): POCGLUCOSE in the last 72 hours.  Lab Results   Component Value Date    FREET4 0.83 05/20/2019    FREET4 0.59 03/10/2017     Imaging reviewed  Chest xray left side infiltrate/pleural edema, + edema bilaterally    Assessment and Plan  GI bleed, she had been on a heparin drip with a prior drop in hemoglobin to 5.4 due to portal vein thrombosis which has been stopped.  She is on a PPI and octreotide GI has been consulted for an EGD.  This was the reason for transfer.  She is receiving a unit of PRBCs after she had a questionable reaction to the prior one with therapy with Benadryl and Solu-Medrol.  Acute respiratory failure with hypoxia, she required intubation this morning.  X-ray shows pleural edema in addition to possible left-sided aspiration.  She is on ceftriaxone.  She does have some secretions per the ET tube.  Transfusion reaction, possible transfusion reaction to PRBC this morning of which was stopped.  We will repeat it with pre-therapy with Solu-Medrol and Benadryl.  Hemoglobin repeat was 6.9.  Transfusion for hemoglobin less than 7.  Cirrhosis, alcoholic.  She has portal vein thrombosis along with elevated T bilirubin and liver enzymes.  She does drink alcohol which she stopped 4 weeks ago.  In case she is in withdrawal she is on propofol.  She is Blane been in the hospital for 7 days.  Aspiration.  Appears to have aspirated on review of x-ray from prior to after intubation.  She  is on ceftriaxone.  Continue to monitor for escalation of antibiotics.  Anemia to blood loss, 2 u prbc outside facility, given 1 u prbc this am, failed prior at this faciilty, ? Reaction    Patient is critically ill. She has severe blood loss anemia requring transfusion in setting of possible transfusion reaction.  Desensitization with steroids and benadryl. Octreotide and PPI drips.  GI bleed.  Vasopressors for MAP > 65 and SBP > 90.  If untreated there is a high chance of deterioration and eventually death. The critical that has been undertaken is medically complex. There has been no overlap in critical care time. Critical Care Time not including procedures: 60 minutes

## 2019-11-17 NOTE — PROGRESS NOTES
Ed from Lab called with critical result of Hgb of 6.2 at 0230. Critical lab result read back to Ed.   Dr. Amol Titus notified of critical lab result at 0242.  Critical lab result read back by Dr. Amol Titus. Order placed for 1 unit of PRBC to be given was placed and given to patient.

## 2019-11-17 NOTE — PROGRESS NOTES
Ed from Lab called with critical result of a 2.3 potassium level at 0217. Critical lab result read back to Ed.   Dr. Amol Titus notified of critical lab result at 0220.  Critical lab result read back by Dr. Amol Titus. Order for potassium chloride IVPB 10 mEq x4 and PO potassium 40 mEq.

## 2019-11-17 NOTE — PROGRESS NOTES
Report received from day shift nurse. Assessment complete. Patient A&Ox2. Patient disoriented to time and situation. Patient denies any discomfort at this time. Call light and belongings within reach. Bed in low position and treaded slippers on patient. Bed alarm on and functioning. Patient resting comfortably in bed. Will continue to monitor hourly.

## 2019-11-17 NOTE — OP REPORT
OP Note  Procedure Date: 11/17/2019     Procedure Time: 11:19-11:25 AM    PreOp Diagnosis: Suspect EV bleeding     PostOp Diagnosis:   Esophagus:   - EV, Grade I no red signs  - LA-B reflux esophagitis  Stomach:  - Gastric erosions and shallow gastric ulcers in prepyloric antrum  - No gastric varix  - Diffuse moderate portal hypertensive gastropathy  Duodenum:   - Mild duodenitis  No fresh blood nor blood clot was seen in the entire exam.    Procedure(s):  GASTROSCOPY - Wound Class: Clean Contaminated    Surgeon(s):  Lucas Soares M.D.    Anesthesiologist/Type of Anesthesia:  No anesthesia staff entered.Modedate sedation, with ICU propofol and Fentanyl drip. No additional medicine was given.    Surgical Staff:  Endoscopy Technician: Waldemar Paez  Sedation/Monitoring Nurse: Georgina Pascual R.N.    Specimens removed if any:  * No specimens in log *    Estimated Blood Loss: 0    Anesthesia/Medications:  see anesthesia note     COMPLICATIONS:  No immediate complications.    PROCEDURE IN DETAIL, Findings and ENDOSCOPIC DIAGNOSIS:      -Prior to the procedure, a History and Physical was performed, and patient medications and allergies were reviewed. The patient’s tolerance of previous anesthesia was also reviewed. The risks and benefits of the procedure and the sedation options and risks were discussed with the patient. All questions were answered, and informed consent was obtained. The patient was deemed in satisfactory condition to undergo the procedure.    -Prior Anticoagulants: Pt was recently given Heparin for PVT    -ASA Grade Assessment: IV, intubated    -The patient was placed in the left lateral decubitus position. The scope was passed under direct vision. Continuous oxygen was provided via nasal cannula and intravenous sedation was administered in divided doses throughout the procedure. The patient’s blood pressure, pulse, and oxygen saturation were monitored continuously throughout the procedure.    -The  gastroscope was gently advanced under direct visualization over the tongue, down the esophagus, through the stomach and into the 2nd portion of the duodenum. The color, texture, mucosa and anatomy of esophagus, stomach and duodenum were carefully examined with the scope. The scope was then withdrawn from the patient and the procedure terminated. Further details are in the finding section, based on anatomical location.    -The patient tolerated the procedure well and there were no immediate complications. The patient remained in ICU room in stable condition. The parents were briefed on the findings.     Findings:  Esophagus:   - EV, Grade I no red signs  - LA-B reflux esophagitis  Stomach:  - Gastric erosions and shallow gastric ulcers in prepyloric antrum  - No gastric varix  - Diffuse moderate portal hypertensive gastropathy  Duodenum:   - Mild duodenitis  No fresh blood nor blood clot was seen in the entire exam.    RECOMMENDATIONS:    1. Continue Octreotide for 24 more hours then ok to D/C if H/H stable  2. Continue PPI q12h IV   3. Continue IV antibiotics  4. Monitor H/H and vitals. Transfusion to keep Hb between 7-9 as needed. Do not over transfuse as it can raise portal pressure and cause further bleeding  5. If rebleed, please do NM RBC bleeding scan STAT    11/17/2019 11:44 AM Lucas Soares M.D.

## 2019-11-17 NOTE — ASSESSMENT & PLAN NOTE
Reason for decompensation may be portal vein thrombus vs EtOH use.  Was treated for an infection at OSH.  No SBP on paracentesis.

## 2019-11-17 NOTE — ASSESSMENT & PLAN NOTE
New infection vs transfusion reaction vs other  Pt was rigoring while receiving blood, so I d/c'ed the transfusion and sent blood back to the lab for possible tranfusion reaction protocol  Covering

## 2019-11-17 NOTE — PROGRESS NOTES
Patient O2 sat 88%-90% on 3L nasal cannula. Turned O2 up to 5L nasal cannula. Listened to patient's lungs and heard wheezing. Called respiratory to come to bedside to give patient a treatment. Also, paged and talked to Dr. Amol Titus. Chest x-ray, fluid decrease to 50mL/hr from 83mL/hr, and BNP ordered for patient.

## 2019-11-17 NOTE — OR SURGEON
EXAMINATION:  11/16/2019 3:45 PM    HISTORY/REASON FOR EXAM:  Ascites         TECHNIQUE/EXAM DESCRIPTION:  Ultrasound-guided paracentesis.    COMPARISON:  None    PROCEDURE:  Informed consent was obtained. A timeout was taken. Ascites was localized with real-time ultrasound. The right lower quadrant of the abdominal wall was prepared and draped in a sterile manner. Following local anesthesia with 1% lidocaine, a 5 [place] Yueh pigtail catheter was advanced into the peritoneal cavity with trocar technique. Ascites was drained. The patient tolerated the procedure well with no evidence of complication.    FINDINGS:  Ascites is present.    Fluid was sent to the laboratory. Specimen 50 mL. No blood loss.    Fluid character: serosanguinous    IMPRESSION:  1. Ultrasound-guided diagnostic paracentesis of the right lower quadrant of the abdominal wall.    2. 50 mL of fluid withdrawn.

## 2019-11-17 NOTE — CONSULTS
Critical Care Consultation    Date of consult: 11/17/2019    Referring Physician  Howie Barraza M.D.    Reason for Consultation  Respiratory failure    History of Presenting Illness  59 y.o. female who presented 11/15/2019 with h/o alcoholic cirrhosis who presented to OSH with decompensated cirrhosis.  Treated with zosyn and flagyl x7days, was hypotensive in shock.  Found to have a portal vein thrombus and was treated with a heparin gtt.  Also treated for EtOH withdrawal. Developed GIB so was transferred to [facility] for high level of care.  Admitted yesterday and treated with octreotide and PPI.  Overnight pt's Hgb dropped and she was given a unit of blood.  She developed respiratory distress, which per RN reports started before the blood transfusion was initiated.  When I evaluated the patient she was in significant respiratory distress with tachypnea, increase WOB, diffuse ronchi and wheezes.  Requiring facemask oxygen. She was also rigoring and confused.  Brought to ICU where I intubated her for respiratory distress and because she'll need an EGD today and would not be safe for EGD without airway protection.  I also stopped the blood transfusion and had it sent back to the lab for evaluation of transfusion reaction given her rigors.  Prior to intubation patient gave limited history but denied abdominal pain or chest pain, just reported dyspnea.  She was oriented x2 (location and self).     Code Status  Full Code    Review of Systems  Review of Systems   Unable to perform ROS: Mental status change       Past Medical History   has a past medical history of Dyslipidemia (5/15/2014), GERD (gastroesophageal reflux disease), Hypertension, Hypothyroid (5/15/2014), Lateral epicondylitis of right elbow (3/17/2017), Primary insomnia (8/24/2016), Screening (3/17/2015), and Unspecified vitamin D deficiency (5/15/2014).    Surgical History   has a past surgical history that includes hemorrhoidectomy.    Family History  family  history includes Hypertension in her father and mother.    Social History   reports that she quit smoking about 8 years ago. Her smoking use included cigarettes. She has a 5.00 pack-year smoking history. She has never used smokeless tobacco. She reports current alcohol use of about 0.5 oz of alcohol per week. She reports that she does not use drugs.    Medications  Home Medications    **Home medications have not yet been reviewed for this encounter**       Current Facility-Administered Medications   Medication Dose Route Frequency Provider Last Rate Last Dose   • ipratropium-albuterol (DUONEB) nebulizer solution  3 mL Nebulization Q2HRS PRN (RT) Howie Barraza M.D.       • ipratropium-albuterol (DUONEB) nebulizer solution  3 mL Nebulization Q4HRS (RT) Howie Barraza M.D.   3 mL at 11/17/19 0834   • NS infusion   Intravenous Continuous Amol Titus M.D.   Stopped at 11/17/19 0500   • furosemide (LASIX) injection 40 mg  40 mg Intravenous BID DIURETIC Amol Titus M.D.       • Respiratory Care per Protocol   Nebulization Continuous RT Ambreen Diaz M.D.       • ipratropium-albuterol (DUONEB) nebulizer solution  3 mL Nebulization Q2HRS PRN (RT) Ambreen Diaz M.D.       • MD Alert...ICU Electrolyte Replacement per Pharmacy   Other PHARMACY TO DOSE Ambreen Diaz M.D.       • lidocaine (XYLOCAINE) 1 % injection 1-2 mL  1-2 mL Tracheal Tube Q30 MIN PRN Ambreen Diaz M.D.   2 mL at 11/17/19 0603   • fentaNYL (SUBLIMAZE) injection  mcg   mcg Intravenous Q HOUR PRN Ambreen Diaz M.D.   100 mcg at 11/17/19 0807   • pantoprazole (PROTONIX) injection 40 mg  40 mg Intravenous BID Ambreen Diaz M.D.   40 mg at 11/17/19 0611   • ondansetron (ZOFRAN ODT) dispertab 4 mg  4 mg Enteral Tube Q4HRS PRN Ambreen Diaz M.D.       • lactulose 20 GM/30ML solution 30 mL  30 mL Enteral Tube TID Ambreen Diaz M.D.   Stopped at 11/17/19 0600   • levothyroxine (SYNTHROID) tablet 50 mcg  50 mcg Enteral Tube AM SIRENA Crook E  MAURY Diaz   Stopped at 11/17/19 0600   • phytonadione (MEPHYTON) tablet 10 mg  10 mg Enteral Tube DAILY Ambreen Diaz M.D.   Stopped at 11/17/19 0600   • riFAXIMin (XIFAXAN) tablet 550 mg  550 mg Enteral Tube BID Ambreen Diaz M.D.   Stopped at 11/17/19 0600   • zinc sulfate (ZINCATE) capsule 220 mg  220 mg Enteral Tube Q12HRS Ambreen Diaz M.D.   Stopped at 11/17/19 0600   • norepinephrine (LEVOPHED) 8 mg in  mL Infusion  0-30 mcg/min Intravenous Continuous Marlon Zavala M.D. 1.9 mL/hr at 11/17/19 0804 1 mcg/min at 11/17/19 0804   • vasopressin (VASOSTRICT) 20 Units in  mL Infusion  0.03 Units/min Intravenous Continuous Marlon Zavala M.D.   Stopped at 11/17/19 0700   • potassium chloride in water (KCL) ivpb **Administer in ICU only** 20 mEq  20 mEq Intravenous Once Marlon Zavala M.D. 50 mL/hr at 11/17/19 0724 20 mEq at 11/17/19 0724   • MD Alert...Vancomycin per Pharmacy   Other PHARMACY TO DOSE Ambreen Diaz M.D.       • piperacillin-tazobactam (ZOSYN) 4.5 g in  mL IVPB  4.5 g Intravenous Once Ambreen Diaz M.D.        And   • piperacillin-tazobactam (ZOSYN) 4.5 g in  mL IVPB  4.5 g Intravenous Q8HRS Ambreen Diaz M.D.       • vancomycin (VANCOCIN) 2,400 mg in  mL IVPB  25 mg/kg Intravenous Once Ambreen Diaz M.D.       • propofol (DIPRIVAN) injection  0-80 mcg/kg/min Intravenous Continuous Marlon Zavala M.D.       • albumin human 25% solution 100 g  100 g Intravenous DAILY Howie Barraza M.D. 150 mL/hr at 11/16/19 1717 100 g at 11/16/19 1717   • ondansetron (ZOFRAN) syringe/vial injection 4 mg  4 mg Intravenous Q4HRS PRN Amol Titus M.D.       • octreotide (SANDOSTATIN) 1,250 mcg in  mL Infusion  50 mcg/hr Intravenous Continuous Amol Titus M.D. 10 mL/hr at 11/17/19 0106 50 mcg/hr at 11/17/19 0106   • influenza vaccine quad injection 0.5 mL  0.5 mL Intramuscular Once PRN Ezio Louie M.D.           Allergies  Allergies   Allergen Reactions   •  Shellfish Allergy        Vital Signs last 24 hours  Temp:  [36.3 °C (97.3 °F)-37.4 °C (99.4 °F)] 36.6 °C (97.9 °F)  Pulse:  [] 81  Resp:  [11-26] 15  BP: ()/(54-84) 91/54  SpO2:  [91 %-100 %] 100 %    Physical Exam  Physical Exam  Constitutional:       General: She is in acute distress.   HENT:      Mouth/Throat:      Mouth: Mucous membranes are dry.   Eyes:      General: Scleral icterus present.   Neck:      Musculoskeletal: Normal range of motion.   Cardiovascular:      Rate and Rhythm: Normal rate and regular rhythm.      Pulses: Normal pulses.   Pulmonary:      Effort: Respiratory distress present.      Breath sounds: Wheezing and rhonchi present.   Abdominal:      General: Abdomen is flat. Bowel sounds are normal. There is no distension.      Palpations: Abdomen is soft.   Musculoskeletal:      Right lower leg: Edema (2+) present.      Left lower leg: Edema (2+) present.   Skin:     General: Skin is warm and dry.      Coloration: Skin is jaundiced.   Neurological:      Comments: Not participating well when I tried to assess for asterixis  AOx2     Psychiatric:      Comments: Unable to assess         Fluids    Intake/Output Summary (Last 24 hours) at 11/17/2019 0846  Last data filed at 11/17/2019 0600  Gross per 24 hour   Intake 1434.75 ml   Output --   Net 1434.75 ml       Laboratory  Recent Results (from the past 48 hour(s))   CBC with Differential    Collection Time: 11/15/19 11:43 PM   Result Value Ref Range    WBC 20.8 (H) 4.8 - 10.8 K/uL    RBC 2.43 (L) 4.20 - 5.40 M/uL    Hemoglobin 7.8 (L) 12.0 - 16.0 g/dL    Hematocrit 24.6 (L) 37.0 - 47.0 %    .2 (H) 81.4 - 97.8 fL    MCH 32.1 27.0 - 33.0 pg    MCHC 31.7 (L) 33.6 - 35.0 g/dL    RDW 80.0 (H) 35.9 - 50.0 fL    Platelet Count 158 (L) 164 - 446 K/uL    MPV 10.4 9.0 - 12.9 fL    Neutrophils-Polys 82.60 (H) 44.00 - 72.00 %    Lymphocytes 13.00 (L) 22.00 - 41.00 %    Monocytes 4.40 0.00 - 13.40 %    Eosinophils 0.00 0.00 - 6.90 %     Basophils 0.00 0.00 - 1.80 %    Nucleated RBC 0.10 /100 WBC    Neutrophils (Absolute) 17.18 (H) 2.00 - 7.15 K/uL    Lymphs (Absolute) 2.70 1.00 - 4.80 K/uL    Monos (Absolute) 0.92 (H) 0.00 - 0.85 K/uL    Eos (Absolute) 0.00 0.00 - 0.51 K/uL    Baso (Absolute) 0.00 0.00 - 0.12 K/uL    NRBC (Absolute) 0.02 K/uL    Anisocytosis 1+     Macrocytosis 1+    Comp Metabolic Panel (CMP)    Collection Time: 11/15/19 11:43 PM   Result Value Ref Range    Sodium 141 135 - 145 mmol/L    Potassium 3.1 (L) 3.6 - 5.5 mmol/L    Chloride 110 96 - 112 mmol/L    Co2 19 (L) 20 - 33 mmol/L    Anion Gap 12.0 (H) 0.0 - 11.9    Glucose 139 (H) 65 - 99 mg/dL    Bun 42 (H) 8 - 22 mg/dL    Creatinine 1.52 (H) 0.50 - 1.40 mg/dL    Calcium 9.4 8.5 - 10.5 mg/dL    AST(SGOT) 69 (H) 12 - 45 U/L    ALT(SGPT) 26 2 - 50 U/L    Alkaline Phosphatase 149 (H) 30 - 99 U/L    Total Bilirubin 9.4 (H) 0.1 - 1.5 mg/dL    Albumin 2.6 (L) 3.2 - 4.9 g/dL    Total Protein 6.2 6.0 - 8.2 g/dL    Globulin 3.6 (H) 1.9 - 3.5 g/dL    A-G Ratio 0.7 g/dL   COD (Adult)    Collection Time: 11/15/19 11:43 PM   Result Value Ref Range    ABO Grouping Only B     Rh Grouping Only POS (A)     Antibody Screen-Cod NEG     Component R       R3                  Red Blood Cells3    [id number]   issued       11/17/19   03:05      Product Type Red Blood Cells LR Pheresis     Dispense Status issued     Unit Number (Barcoded) [id number]     Product Code (Barcoded) C3014H29     Blood Type (Barcoded) 7300     Component R       R3                  Red Blood Cells3    [id number]   issued       11/17/19   07:42      Product Type Red Blood Cells LR Pheresis     Dispense Status issued     Unit Number (Barcoded) [id number]     Product Code (Barcoded) P0073M32     Blood Type (Barcoded) 7300    APTT(PTT)    Collection Time: 11/15/19 11:43 PM   Result Value Ref Range    APTT 42.2 (H) 24.7 - 36.0 sec   Prothrombin time (INR)    Collection Time: 11/15/19 11:43 PM   Result Value Ref Range     PT 19.6 (H) 12.0 - 14.6 sec    INR 1.61 (H) 0.87 - 1.13   VITAMIN B12    Collection Time: 11/15/19 11:43 PM   Result Value Ref Range    Vitamin B12 -True Cobalamin >1500 (H) 211 - 911 pg/mL   FOLATE    Collection Time: 11/15/19 11:43 PM   Result Value Ref Range    Folate -Folic Acid 12.8 >4.0 ng/mL   TSH WITH REFLEX TO FT4    Collection Time: 11/15/19 11:43 PM   Result Value Ref Range    TSH 1.450 0.380 - 5.330 uIU/mL   IRON/TOTAL IRON BIND    Collection Time: 11/15/19 11:43 PM   Result Value Ref Range    Iron 113 40 - 170 ug/dL    Total Iron Binding 119 (L) 250 - 450 ug/dL    % Saturation 95 (H) 15 - 55 %   ESTIMATED GFR    Collection Time: 11/15/19 11:43 PM   Result Value Ref Range    GFR If  43 (A) >60 mL/min/1.73 m 2    GFR If Non African American 35 (A) >60 mL/min/1.73 m 2   DIFFERENTIAL MANUAL    Collection Time: 11/15/19 11:43 PM   Result Value Ref Range    Manual Diff Status PERFORMED    PERIPHERAL SMEAR REVIEW    Collection Time: 11/15/19 11:43 PM   Result Value Ref Range    Peripheral Smear Review see below    PLATELET ESTIMATE    Collection Time: 11/15/19 11:43 PM   Result Value Ref Range    Plt Estimation Decreased    MORPHOLOGY    Collection Time: 11/15/19 11:43 PM   Result Value Ref Range    RBC Morphology Present     Polychromia 1+     Poikilocytosis 2+     Schistocytes 1+     Echinocytes 1+    PERIPHERAL SMEAR REVIEW    Collection Time: 11/16/19  1:46 AM   Result Value Ref Range    Peripheral Smear Review see below    Urinalysis    Collection Time: 11/16/19  5:45 AM   Result Value Ref Range    Color DK Yellow     Character Cloudy (A)     Specific Gravity 1.018 <1.035    Ph 5.0 5.0 - 8.0    Glucose Negative Negative mg/dL    Ketones Negative Negative mg/dL    Protein Negative Negative mg/dL    Bilirubin Small (A) Negative    Urobilinogen, Urine 0.2 Negative    Nitrite Negative Negative    Leukocyte Esterase Small (A) Negative    Occult Blood Large (A) Negative    Micro Urine Req  Microscopic    URINE MICROSCOPIC (W/UA)    Collection Time: 11/16/19  5:45 AM   Result Value Ref Range    WBC 0-2 /hpf    RBC 10-20 (A) /hpf    Bacteria Many (A) None /hpf    Epithelial Cells Negative /hpf    Hyaline Cast 0-2 /lpf   ABO Rh Confirm    Collection Time: 11/16/19  6:04 AM   Result Value Ref Range    ABO Rh Confirm B POS    HGB (Hemoglobin) for 48 hours    Collection Time: 11/16/19  6:04 AM   Result Value Ref Range    Hemoglobin 8.1 (L) 12.0 - 16.0 g/dL   MAGNESIUM    Collection Time: 11/16/19  6:04 AM   Result Value Ref Range    Magnesium 2.1 1.5 - 2.5 mg/dL   AMMONIA    Collection Time: 11/16/19  6:04 AM   Result Value Ref Range    Ammonia 46 (H) 11 - 45 umol/L   URINALYSIS    Collection Time: 11/16/19  2:45 PM   Result Value Ref Range    Color DK Yellow     Character Clear     Specific Gravity 1.016 <1.035    Ph 5.0 5.0 - 8.0    Glucose Negative Negative mg/dL    Ketones Negative Negative mg/dL    Protein Negative Negative mg/dL    Bilirubin Small (A) Negative    Urobilinogen, Urine 0.2 Negative    Nitrite Negative Negative    Leukocyte Esterase Trace (A) Negative    Occult Blood Negative Negative    Micro Urine Req Microscopic    URINE MICROSCOPIC (W/UA)    Collection Time: 11/16/19  2:45 PM   Result Value Ref Range    WBC 0-2 /hpf    RBC 2-5 (A) /hpf    Bacteria Negative None /hpf    Epithelial Cells Negative /hpf    Hyaline Cast 3-5 (A) /lpf   Fluid Cell Count    Collection Time: 11/16/19  3:30 PM   Result Value Ref Range    Fluid Type Ascites     Color-Body Fluid Red     Character-Body Fluid Bloody     Total RBC Count 2000 cells/uL    Total  cells/uL    Polys 86 %    Lymphs 12 %    Mononuclear Cells - Fluid 2 %    Comments see below    GRAM STAIN    Collection Time: 11/16/19  3:30 PM   Result Value Ref Range    Significant Indicator .     Source BF     Site Ascites Fluid     Gram Stain Result Moderate WBCs.  No organisms seen.      VITAMIN D,25 HYDROXY    Collection Time: 11/17/19  1:17 AM    Result Value Ref Range    25-Hydroxy   Vitamin D 25 43 30 - 100 ng/mL   proBrain Natriuretic Peptide, NT    Collection Time: 11/17/19  1:17 AM   Result Value Ref Range    NT-proBNP 2694 (H) 0 - 125 pg/mL   SHAY WITH ANTI-IGG REAGENT AND ANTI-C3D    Collection Time: 11/17/19  1:38 AM   Result Value Ref Range    Shay With Anti-IgG Reagent POS (A)     Shay With Anti-C3D Reagent NEG    CBC WITH DIFFERENTIAL    Collection Time: 11/17/19  1:46 AM   Result Value Ref Range    WBC 17.2 (H) 4.8 - 10.8 K/uL    RBC 1.87 (L) 4.20 - 5.40 M/uL    Hemoglobin 6.2 (L) 12.0 - 16.0 g/dL    Hematocrit 19.5 (L) 37.0 - 47.0 %    .1 (H) 81.4 - 97.8 fL    MCH 32.6 27.0 - 33.0 pg    MCHC 31.9 (L) 33.6 - 35.0 g/dL    RDW 79.0 (H) 35.9 - 50.0 fL    Platelet Count 137 (L) 164 - 446 K/uL    MPV 9.8 9.0 - 12.9 fL    Neutrophils-Polys 80.90 (H) 44.00 - 72.00 %    Lymphocytes 10.40 (L) 22.00 - 41.00 %    Monocytes 8.70 0.00 - 13.40 %    Eosinophils 0.00 0.00 - 6.90 %    Basophils 0.00 0.00 - 1.80 %    Nucleated RBC 0.20 /100 WBC    Neutrophils (Absolute) 13.91 (H) 2.00 - 7.15 K/uL    Lymphs (Absolute) 1.79 1.00 - 4.80 K/uL    Monos (Absolute) 1.50 (H) 0.00 - 0.85 K/uL    Eos (Absolute) 0.00 0.00 - 0.51 K/uL    Baso (Absolute) 0.00 0.00 - 0.12 K/uL    NRBC (Absolute) 0.04 K/uL    Anisocytosis 1+     Macrocytosis 1+    Comp Metabolic Panel    Collection Time: 11/17/19  1:46 AM   Result Value Ref Range    Sodium 146 (H) 135 - 145 mmol/L    Potassium 2.3 (LL) 3.6 - 5.5 mmol/L    Chloride 111 96 - 112 mmol/L    Co2 22 20 - 33 mmol/L    Anion Gap 13.0 (H) 0.0 - 11.9    Glucose 139 (H) 65 - 99 mg/dL    Bun 38 (H) 8 - 22 mg/dL    Creatinine 1.28 0.50 - 1.40 mg/dL    Calcium 9.9 8.5 - 10.5 mg/dL    AST(SGOT) 64 (H) 12 - 45 U/L    ALT(SGPT) 28 2 - 50 U/L    Alkaline Phosphatase 99 30 - 99 U/L    Total Bilirubin 7.7 (H) 0.1 - 1.5 mg/dL    Albumin 3.7 3.2 - 4.9 g/dL    Total Protein 6.4 6.0 - 8.2 g/dL    Globulin 2.7 1.9 - 3.5 g/dL    A-G Ratio 1.4 g/dL    MAGNESIUM    Collection Time: 11/17/19  1:46 AM   Result Value Ref Range    Magnesium 2.1 1.5 - 2.5 mg/dL   PHOSPHORUS    Collection Time: 11/17/19  1:46 AM   Result Value Ref Range    Phosphorus 4.0 2.5 - 4.5 mg/dL   Prothrombin Time    Collection Time: 11/17/19  1:46 AM   Result Value Ref Range    PT 21.1 (H) 12.0 - 14.6 sec    INR 1.77 (H) 0.87 - 1.13   APTT    Collection Time: 11/17/19  1:46 AM   Result Value Ref Range    APTT 40.4 (H) 24.7 - 36.0 sec   ESTIMATED GFR    Collection Time: 11/17/19  1:46 AM   Result Value Ref Range    GFR If  52 (A) >60 mL/min/1.73 m 2    GFR If Non  43 (A) >60 mL/min/1.73 m 2   DIFFERENTIAL MANUAL    Collection Time: 11/17/19  1:46 AM   Result Value Ref Range    Manual Diff Status PERFORMED    PLATELET ESTIMATE    Collection Time: 11/17/19  1:46 AM   Result Value Ref Range    Plt Estimation Decreased    MORPHOLOGY    Collection Time: 11/17/19  1:46 AM   Result Value Ref Range    RBC Morphology Present     Polychromia 1+     Poikilocytosis 2+     Schistocytes 1+     Echinocytes 1+    ISTAT ARTERIAL BLOOD GAS    Collection Time: 11/17/19  4:28 AM   Result Value Ref Range    Ph 7.413 7.400 - 7.500    Pco2 32.6 26.0 - 37.0 mmHg    Po2 65 64 - 87 mmHg    Tco2 22 20 - 33 mmol/L    S02 93 93 - 99 %    Hco3 20.8 17.0 - 25.0 mmol/L    BE -4 -4 - 3 mmol/L    Body Temp 98.7 F degrees    O2 Therapy 50 %    iPF Ratio 130     Ph Temp Salvador 7.412 7.400 - 7.500    Pco2 Temp Co 32.7 26.0 - 37.0 mmHg    Po2 Temp Cor 65 64 - 87 mmHg    Specimen Arterial     Action Range Triggered NO     Inst. Qualified Patient YES    URINE SODIUM RANDOM    Collection Time: 11/17/19  5:30 AM   Result Value Ref Range    Sodium, Urine -per volume 112 mmol/L   URINE CREATININE RANDOM    Collection Time: 11/17/19  5:30 AM   Result Value Ref Range    Creatinine, Random Urine 13.10 mg/dL   Transfusion Reaction    Collection Time: 11/17/19  5:30 AM   Result Value Ref Range    ABO Grouping  Only B     Rh Grouping Only POS     Stat Transfusion Investigation DONE     Shay With Anti-IgG Reagent POS (A)     Shay With Anti-C3D Reagent NEG    HEMOGLOBIN AND HEMATOCRIT    Collection Time: 11/17/19  5:50 AM   Result Value Ref Range    Hemoglobin 6.9 (L) 12.0 - 16.0 g/dL    Hematocrit 21.8 (L) 37.0 - 47.0 %   Triglycerides Starting now and then Every 3 Days    Collection Time: 11/17/19  5:50 AM   Result Value Ref Range    Triglycerides 91 0 - 149 mg/dL   PLATELET MAPPING WITH BASIC TEG    Collection Time: 11/17/19  5:50 AM   Result Value Ref Range    Reaction Time Initial-R 6.2 5.0 - 10.0 min    Clot Kinetics-K 1.2 1.0 - 3.0 min    Clot Angle-Angle 73.1 (H) 53.0 - 72.0 degrees    Maximum Clot Strength-MA 31.7 (L) 50.0 - 70.0 mm    Lysis 30 minutes-LY30 0.0 0.0 - 8.0 %    % Inhibition ADP 63.4 %    % Inhibition AA 4.5 %    TEG Algorithm Link Algorithm        Imaging  DX-CHEST-PORTABLE (1 VIEW)   Final Result      1.  Endotracheal tube appears appropriately located      2.  Unchanged pulmonary edema      DX-CHEST-PORTABLE (1 VIEW)   Final Result      Development of moderate pulmonary edema      US-ABDOMEN COMPLETE SURVEY   Final Result      1.  There is ascites in the abdomen and pelvis.      2.  Sludge is noted in the gallbladder but no gallstones are identified.      3.  Small left pleural effusion is identified.      4.  Borderline splenomegaly.      5.  Mild dilatation of common bile duct. No intrahepatic ductal dilatation is appreciated.         US-PARACENTESIS, ABD WITH IMAGING   Final Result      1. Ultrasound-guided diagnostic paracentesis of the right lower quadrant of the abdominal wall.      2. 50 mL of fluid withdrawn.      DX-CHEST-PORTABLE (1 VIEW)   Final Result      1.  Left lower lobe atelectasis or pneumonia.      2.  No right lung consolidation.      3.  Right IJV central line in place.          Assessment/Plan  * GI bleed- (present on admission)  Assessment & Plan  Varices vs portal hypertensive  gastropathy vs other  Central line and PIV  TEG, coags  Transfuse <7  Octreotide gtt  Pantoprazole IV BID  GI consult for scope today, I don't see a note from them yesterday  Zosyn will cover for SBP risk    Rigors  Assessment & Plan  New infection vs transfusion reaction vs other  Pt was rigoring while receiving blood, so I d/c'ed the transfusion and sent blood back to the lab for possible tranfusion reaction protocol  Covering     ARDS (adult respiratory distress syndrome) ([facility])  Assessment & Plan  Ddx pneumonia, TRALI, aspiration  P:F 130, moderate  Bronch was consistent with alveolar hemorrhage.  Any of the above diagnoses could cause alveolar hemorrhage in the setting of coagulopathy.  TEG pending  Lung protective ventilation  Diurese   Vancomycin, zosyn to cover hospital acquired PNA  Defer MRSA nares as pt seems to be quite coagulopathic right now and I don't want to cause epistaxis  BAL from lingula sent for culture    Respiratory failure ([facility])  Assessment & Plan  Intubated for respiratory distress and altered mental status  CXR with new bilateral infiltrates  Differential includes new pneumonia, TRALI, TACO, aspiration    Plan:  -vanc/zosyn for pneumonia at high risk of resistant organisms      HERBERTH (acute kidney injury) ([facility])- (present on admission)  Assessment & Plan  Improving today  trend    Portal vein thrombosis- (present on admission)  Assessment & Plan  Anti-coagulation contraindicated, and indeed, not typically indicated in patients with cirrhosis unless there is evidence of gut ischemia    Alcoholic hepatitis with ascites- (present on admission)  Assessment & Plan  D/c prednisolone.  There is very little data to support it's use and it could make things worse in setting of a bleed    Decompensated hepatic cirrhosis (HCC)  Assessment & Plan  Reason for decompensation may be portal vein thrombus vs EtOH use.  Was treated for an infection at OSH.  No SBP on paracentesis.      Hypokalemia- (present on  admission)  Assessment & Plan  Replete aggressively    Anemia associated with acute blood loss- (present on admission)  Assessment & Plan  Transfuse <7    Hepatic encephalopathy (HCC)- (present on admission)  Assessment & Plan  Lactulose, rifaximin    Hypothyroidism- (present on admission)  Assessment & Plan  levothyroxine    Pain/sedation: fentanyl pushes, then prop gtt if needed  LDA: CVC, ETT, hernandez, PIV.  Don't place OGT until after EGD because of risk of varices  Diet: NPO now  Full code.  Not discussed today      Discussed patient condition and risk of morbidity and/or mortality with Hospitalist, Family, RN, RT and Patient.    The patient remains critically ill with life-threatening GI hemorrhage, respiratory failure requiring mechanical ventilation, and decompensated cirrhosis.  Her risk of mortality and clinical decompensation from her illnesses is extremely high.  Critical care time = 108 minutes in directly providing and coordinating critical care and extensive data review.  No time overlap and excludes procedures.

## 2019-11-18 NOTE — PROGRESS NOTES
2 RN skin check complete. Bilateral upper extremity scabbing/brusiing. 2 RUE skin tears documented. Sacrum is red but blanching. Minor weeping/bleeding from around rectum area.

## 2019-11-18 NOTE — CONSULTS
"Reason for PC Consult: Advance Care Planning    Consulted by: Dr. Barraaz    Assessment:  General: 58 y/o woman from [place] with abdominal pain and concern for GIB d/t alcoholic liver disease. Pt intubated 11/17 for respiratory distress. EGD revealed grade 1 varices, reflux esophagitis, and gastric ulcers. No active bleed noted. On admit, bili 9.4, albumin 2.6, plt 158, INR 1.7. PMHx HTN, GERD, DLD, hypothyroid.    Social: Pt was living with her  Sunil until about a month ago when he dropped her off to her parent's home and left. Per family, he too is an alcoholic. She quit her job she was at for 13 years [place]. She never had any children and has one sister Tamra in [place]. Her parents Idalia and Phan Gupta have been looking after her the last month.    Consults: GI, PMA    Dyspnea: Yes- vent support; possible extubation today  Last BM: 11/18/19-    Pain: No-    Depression: Unable to determine-    Dementia: Unable to Determine;       Spiritual:  Is Alevism or spirituality important for coping with this illness? Yes- order placed  Has a  or spiritual provider visit been requested? Yes    Palliative Performance Scale: 30%    Advance Directive: None- discused with family; will attempt to complete if pt is able  DPOA: No-    POLST: No-      Code Status: Full-      Outcome:  PC RN placed a call to Mattie after speaking with BS RN this AM and explained the role of PC. The provided her social and medical history, as well as their understanding of the current situation. Idalia expressed that they have not come to the hospital in the last couple days because \"she wouldn't even know we're there and it's hard for us.\" PC RN verbalized understanding and validated their concerns. Updates provided per the RN on the hope to conduct breathing trials and hopefully extubate her. PC RN explained the hope to have conversations with her about her POC, goals, etc., when she is able. They are thankful for " "all the care being provided and \"hope she can just get better.\" They are remorseful for not knowing what was going on. PC RN recognized the difficulty in having to watch Emily go through this, but also recognize that she made these choices and they've stepped up to help her now. They were appreciative of the support.    At this time, plan made to continue following her hospital stay and remain available to help with anything they need. While talking to Emily's parents, PC RN provided therapeutic communication, including open ended questions, reflective listening, and normalization of thought and feelings throughout encounter, as well as reinforced Emily's goals of care. PC contact information provided to Mattie and encouraged to call with any questions or concerns.     Updated: BS RN    Plan: follow for extubation and hopeful pt will be able to participate in POC    Thank you for allowing Palliative Care to participate in this patient's care. Please feel free to call [phone] with any questions or concerns.  "

## 2019-11-18 NOTE — PROGRESS NOTES
Monitor summary:  SR occ PACs, rate 70-80  .18/.08/.34    Shift summary:  Pt required varying amounts of Levophed overnight.  During sedation vacation, pt woke and became tremulous.  Apneic with morning SBT, SaO2 67%

## 2019-11-18 NOTE — RESPIRATORY CARE
Adult Ventilation Update    Total Vent Days: 2    Patient Lines/Drains/Airways Status    Active Airway     Name: Placement date: Placement time: Site: Days:    Airway ETT 7.5  11/17/19   0545   --  less than 1              CMV 20 360 +8 50%         Plateau Pressure (Q Shift): 18 (11/17/19 1846)  Static Compliance (ml / cm H2O): 33.2 (11/18/19 0232)    Patient failed trials because of Barriers to Wean: Evidence of active shock,hemmorhage or sepsis(per MD, alveolar hemmorhage) (11/17/19 1527)  Barriers to SBT Weaning Trial Stopped due to:: Apnea > 30 seconds X 4;SpO2 <90% (11/18/19 0450)  Length of Weaning Trial          Sputum/Suction   Cough: Productive (11/18/19 0232)  Sputum Amount: Small (11/18/19 0232)  Sputum Color: Tan (11/18/19 0232)  Sputum Consistency: Thin (11/18/19 0232)    Mobility  Level of Mobility: Level III (11/18/19 0300)  Activity Performed: Edge of bed (11/18/19 0300)  Time Activity Tolerated: 10 min (11/18/19 0300)  Distance Per Occurrence (ft.): 0 feet (11/16/19 0800)  # of Times Distance was Traveled: 0 (11/16/19 0800)  Assistance: Assistance of Two or More (11/18/19 0300)  Ambulation Tolerance: General Weakness (11/18/19 0300)  Pt Calls for Assistance: No (11/18/19 0300)  Staff Present for Mobilization: RN;CNA (11/18/19 0300)  Gait: Unable to Ambulate (11/18/19 0300)  Assistive Devices: Hand held assist (11/18/19 0300)  Reason Not Mobilized: Unstable condition (11/17/19 1200)  Mobilization Comments: (needed full assist of 2 for lateral support while sitting) (11/18/19 0300)    Events/Summary/Plan: SBT attemped, pt went apnic x4, desatted into the 70s (11/18/19 0450)

## 2019-11-18 NOTE — PROGRESS NOTES
Gastroenterology Progress Note:    Ting-Jenn Fany  Date & Time note created:    11/18/2019   11:02 AM     Patient ID:  Name:             Emily Torres    YOB: 1960  Age:                 59 y.o.  female  MRN:               [id number]    Referring MD:  Dr. Titus                                                             Chief Complaint(s):      Anemia, GI bleeding    History of Present Illness:    This is a very pleasant 59 y.o. female with the past medical history as listed below.  ===Dr. Sutherland's consult 11/17/19===  Patient is a 59-year-old female transferred here from [facility] where she presented originally with abdominal distention.  She was noted to have ascites and jaundice  at that point and diagnosed with alcohol abuse, but she had quit about a month before this.  She denied any abdominal pain at that time, she was diagnosed with the portal vein thrombosis there and started on anticoagulants.  However, her hemoglobin began to drop and there were reports of melena and hence her transfer here yesterday.  Last night, she had further drop in her hemoglobin as well as   respiratory distress.  She received a bronchoscopy as well as intubation and currently is in the ICU.  She is unable to give any history, so my history now is gleaned from discussion with Dr. Titus, the current ICU rounding team, and nursing and we are being asked to evaluate for her liver and GI bleeding issues.  Of note, her discriminant function is calculated at 52.  MELD score 24.  ===  11/17/2019 EGD:  Esophagus:   - EV, Grade I no red signs  - LA-B reflux esophagitis  Stomach:  - Gastric erosions and shallow gastric ulcers in prepyloric antrum  - No gastric varix  - Diffuse moderate portal hypertensive gastropathy  Duodenum:   - Mild duodenitis  No fresh blood nor blood clot was seen in the entire exam.    11/18/2019 May be extubated today. No melena or hematochezia per nurse. Otherwise ok.       Review of Systems:       Unable to review. Intubated and sedated    Past Medical History:   Past Medical History:   Diagnosis Date   • Dyslipidemia 5/15/2014    Recently started working out with  - 3 weeks ago ,  Will try lifestyle x 3-4 months    • GERD (gastroesophageal reflux disease)    • Hypertension    • Hypothyroid 5/15/2014    TSH 3.82 - repeat in Sept   • Lateral epicondylitis of right elbow 3/17/2017   • Primary insomnia 8/24/2016   • Screening 3/17/2015   • Unspecified vitamin D deficiency 5/15/2014    5 uncorrected. Start 50,000IU daily.      Active Hospital Problems    Diagnosis   • Acute respiratory failure with hypoxia (HCC) [J96.01]     Priority: High   • ARDS (adult respiratory distress syndrome) (HCC) [J80]     Priority: High   • Rigors [R68.89]     Priority: High   • GI bleed [K92.2]     Priority: High   • HERBERTH (acute kidney injury) (HCC) [N17.9]     Priority: Medium   • Alcoholic hepatitis with ascites [K70.11]     Priority: Medium   • Portal vein thrombosis [I81]     Priority: Medium   • Hypokalemia [E87.6]     Priority: Low   • Leukocytosis [D72.829]     Priority: Low   • Hepatic encephalopathy (HCC) [K72.90]     Priority: Low   • Anemia associated with acute blood loss [D62]     Priority: Low   • Hypothyroidism [E03.9]     Priority: Low   • Decompensated hepatic cirrhosis (HCC) [K72.90]       Past Surgical History:  Past Surgical History:   Procedure Laterality Date   • GASTROSCOPY-ENDO  11/17/2019    Procedure: GASTROSCOPY;  Surgeon: [place] Soares M.D.;  Location: ENDOSCOPY [facility];  Service: Gastroenterology   • HEMORRHOIDECTOMY         Hospital Medications:  Current Facility-Administered Medications   Medication Dose Frequency Provider Last Rate Last Dose   • dexmedetomidine (PRECEDEX) 400 mcg/100mL NS premix infusion  0.1-1.5 mcg/kg/hr Continuous Scott Novak M.D. 4.9 mL/hr at 11/18/19 1051 0.2 mcg/kg/hr at 11/18/19 1051   • ipratropium-albuterol (DUONEB) nebulizer solution  3  mL Q4HRS (RT) Howie Barraza M.D.   3 mL at 11/18/19 1018   • furosemide (LASIX) injection 40 mg  40 mg BID DIURETIC Amol Titus M.D.   40 mg at 11/18/19 0515   • Respiratory Care per Protocol   Continuous RT Ambreen Diaz M.D.       • ipratropium-albuterol (DUONEB) nebulizer solution  3 mL Q2HRS PRN (RT) Ambreen Diaz M.D.       • MD Alert...ICU Electrolyte Replacement per Pharmacy   PHARMACY TO DOSE Ambreen Diaz M.D.       • lidocaine (XYLOCAINE) 1 % injection 1-2 mL  1-2 mL Q30 MIN PRN Ambreen Diaz M.D.   2 mL at 11/17/19 0603   • pantoprazole (PROTONIX) injection 40 mg  40 mg BID Ambreen Diaz M.D.   40 mg at 11/18/19 0515   • ondansetron (ZOFRAN ODT) dispertab 4 mg  4 mg Q4HRS PRN Ambreen Diaz M.D.       • levothyroxine (SYNTHROID) tablet 50 mcg  50 mcg AM ES Ambreen Diaz M.D.   Stopped at 11/17/19 0600   • phytonadione (MEPHYTON) tablet 10 mg  10 mg DAILY Ambreen Diaz M.D.   Stopped at 11/17/19 0600   • zinc sulfate (ZINCATE) capsule 220 mg  220 mg Q12HRS Ambreen Diaz M.D.   Stopped at 11/17/19 0600   • norepinephrine (LEVOPHED) 8 mg in  mL Infusion  0-30 mcg/min Continuous Marlon Zavala M.D. 9.4 mL/hr at 11/17/19 2136 5 mcg/min at 11/17/19 2136   • propofol (DIPRIVAN) injection  0-80 mcg/kg/min Continuous Marlon Zavala M.D. 3.1 mL/hr at 11/18/19 1026 5 mcg/kg/min at 11/18/19 1026   • fentaNYL (SUBLIMAZE) 50 mcg/mL in 50mL (Continuous Infusion)   Continuous Marlon Zavala M.D. 0.5 mL/hr at 11/18/19 1026 25 mcg/hr at 11/18/19 1026   • fentaNYL (SUBLIMAZE) injection  mcg   mcg Q HOUR PRN Maroln Zavala M.D.       • ampicillin/sulbactam (UNASYN) 3 g in  mL IVPB  3 g Q6HRS Marlon Zavala M.D.   Stopped at 11/18/19 0545   • K+ Scale: Goal of 4.5  1 Each Q6HRS Marlon Zavala M.D.   1 Each at 11/18/19 0625   • ondansetron (ZOFRAN) syringe/vial injection 4 mg  4 mg Q4HRS PRN Amol Titus M.D.       • influenza vaccine quad injection 0.5 mL  0.5 mL Once PRN Ezio  "MAURY Louie       Last reviewed on 6/7/2019  9:27 AM by Juany Griffin [facility]      Current Outpatient Medications:  Medications Prior to Admission   Medication Sig Dispense Refill Last Dose   • levothyroxine (SYNTHROID) 50 MCG Tab TAKE 1 TAB BY MOUTH EVERY MORNING ON AN EMPTY STOMACH. 90 Tab 0 11/15/2019 at Unknown time   • lisinopril-hydrochlorothiazide (PRINZIDE, ZESTORETIC) 20-25 MG per tablet Take 1 Tab by mouth every day. TAKE 1 TAB BY MOUTH EVERY DAY. 90 Tab 1 11/15/2019 at Unknown time   • vitamin D, Ergocalciferol, (DRISDOL) 52483 units Cap capsule Take 1 Cap by mouth every 7 days. 12 Cap 0 11/15/2019 at Unknown time   • omeprazole (PRILOSEC) 20 MG delayed-release capsule Take 1 Cap by mouth every day. 90 Cap 1 11/15/2019 at Unknown time   • fluticasone (FLOVENT HFA) 44 MCG/ACT AERO Inhale 2 Puffs by mouth 2 times a day. 1 Inhaler 3 11/15/2019 at Unknown time   • albuterol (VENTOLIN OR PROVENTIL) 108 (90 BASE) MCG/ACT AERS Inhale 2 Puffs by mouth every 6 hours as needed for Shortness of Breath. 8.5 g 3 11/15/2019 at Unknown time       Medication Allergy:  Allergies   Allergen Reactions   • Shellfish Allergy        Physical Exam:  Weight/BMI: Body mass index is 34.73 kg/m².  BP (!) 91/51   Pulse 83   Temp 36.5 °C (97.7 °F) (Bladder)   Resp 20   Ht 1.676 m (5' 6\")   Wt 97.6 kg (215 lb 2.7 oz)   SpO2 97%   Vitals:    11/18/19 0708 11/18/19 0800 11/18/19 0900 11/18/19 1018   BP:  (!) 90/51 (!) 91/51    Pulse:  86 83    Resp:  (!) 23 20    Temp:  36.5 °C (97.7 °F)     TempSrc:  Bladder     SpO2: 98% 98% 98% 97%   Weight:       Height:         Oxygen Therapy:  Pulse Oximetry: 97 %, O2 Delivery: Ventilator    Intake/Output Summary (Last 24 hours) at 11/18/2019 1102  Last data filed at 11/18/2019 1000  Gross per 24 hour   Intake 2177.62 ml   Output 2450 ml   Net -272.38 ml       Constitutional:   Well developed, well nourished, in acute distress, intubated  HEENT:  Normocephalic, Atraumatic, " Conjunctiva not pale, Sclera icteric, Oropharynx moist mucous membranes, No oral exudates, Nose normal.  No thyromegaly.  Neck:  Normal range of motion, No cervical tenderness,  no JVD.  Chest/Lungs:  Symmetric expansion, no spider angioma, breath sounds clear to auscultation bilaterally,  no crackles, no wheezing.   Cardiovascular:  Normal heart rate, Normal rhythm, No murmurs, No rubs, No gallops.    Abdomen: Bowel sounds normal, Soft, No tenderness, No guarding, No rebound, No masses, No hepatosplenomegaly.  Extremities: No cyanosis/clubbing/edema/palmar erythema/flapping tremor  Skin: Warm, Dry, No erythema, No rash, no induration.  Physical Exam    MDM (Data Review):     Records reviewed and summarized in current documentation    Lab Data Review:  Recent Results (from the past 24 hour(s))   POTASSIUM SERUM (K)    Collection Time: 11/17/19 11:42 AM   Result Value Ref Range    Potassium 2.4 (LL) 3.6 - 5.5 mmol/L   ISTAT ARTERIAL BLOOD GAS    Collection Time: 11/17/19 12:35 PM   Result Value Ref Range    Ph 7.380 (L) 7.400 - 7.500    Pco2 33.5 26.0 - 37.0 mmHg    Po2 106 (H) 64 - 87 mmHg    Tco2 21 20 - 33 mmol/L    S02 98 93 - 99 %    Hco3 19.8 17.0 - 25.0 mmol/L    BE -5 (L) -4 - 3 mmol/L    Body Temp 36.4 C degrees    O2 Therapy 60 %    iPF Ratio 177     Ph Temp Salvador 7.389 (L) 7.400 - 7.500    Pco2 Temp Co 32.6 26.0 - 37.0 mmHg    Po2 Temp Cor 102 (H) 64 - 87 mmHg    Specimen Arterial     Action Range Triggered NO     Inst. Qualified Patient YES    GRAM STAIN    Collection Time: 11/17/19  2:16 PM   Result Value Ref Range    Significant Indicator .     Source RESP     Site BRONCHOALVEOLAR LAVAGE     Gram Stain Result No organisms seen.    BLOOD CULTURE    Collection Time: 11/17/19  2:17 PM   Result Value Ref Range    Significant Indicator NEG     Source BLD     Site PERIPHERAL     Culture Result       No Growth  Note: Blood cultures are incubated for 5 days and  are monitored continuously.Positive blood  cultures  are called to the RN and reported as soon as  they are identified.     BLOOD CULTURE    Collection Time: 11/17/19  2:17 PM   Result Value Ref Range    Significant Indicator NEG     Source BLD     Site PERIPHERAL     Culture Result       No Growth  Note: Blood cultures are incubated for 5 days and  are monitored continuously.Positive blood cultures  are called to the RN and reported as soon as  they are identified.     HEMOGLOBIN AND HEMATOCRIT    Collection Time: 11/17/19  5:14 PM   Result Value Ref Range    Hemoglobin 7.8 (L) 12.0 - 16.0 g/dL    Hematocrit 24.7 (L) 37.0 - 47.0 %   FOLATE    Collection Time: 11/17/19  7:40 PM   Result Value Ref Range    Folate -Folic Acid 8.8 >4.0 ng/mL   VITAMIN D,25 HYDROXY    Collection Time: 11/17/19  7:40 PM   Result Value Ref Range    25-Hydroxy   Vitamin D 25 44 30 - 100 ng/mL   HEP C VIRUS ANTIBODY    Collection Time: 11/17/19  7:40 PM   Result Value Ref Range    Hepatitis C Antibody Negative Negative   HEP B SURFACE ANTIGEN    Collection Time: 11/17/19  7:40 PM   Result Value Ref Range    Hepatitis B Surface Antigen Negative Negative   HEP B SURFACE AB    Collection Time: 11/17/19  7:40 PM   Result Value Ref Range    Hep B Surface Antibody Quant <3.10 0.00 - 10.00 mIU/mL   HEP B CORE AB TOTAL    Collection Time: 11/17/19  7:40 PM   Result Value Ref Range    Hepatitis B Core Ab, Total Negative Negative   POTASSIUM SERUM (K)    Collection Time: 11/17/19  7:40 PM   Result Value Ref Range    Potassium 2.3 (LL) 3.6 - 5.5 mmol/L   HEMOGLOBIN AND HEMATOCRIT    Collection Time: 11/18/19 12:20 AM   Result Value Ref Range    Hemoglobin 7.8 (L) 12.0 - 16.0 g/dL    Hematocrit 24.7 (L) 37.0 - 47.0 %   POTASSIUM SERUM (K)    Collection Time: 11/18/19 12:20 AM   Result Value Ref Range    Potassium 2.6 (LL) 3.6 - 5.5 mmol/L   ISTAT ARTERIAL BLOOD GAS    Collection Time: 11/18/19  4:18 AM   Result Value Ref Range    Ph 7.417 7.400 - 7.500    Pco2 34.3 26.0 - 37.0 mmHg    Po2 64 64  - 87 mmHg    Tco2 23 20 - 33 mmol/L    S02 93 93 - 99 %    Hco3 22.1 17.0 - 25.0 mmol/L    BE -2 -4 - 3 mmol/L    Body Temp 36.2 C degrees    O2 Therapy 40 %    iPF Ratio 160     Ph Temp Salvador 7.429 7.400 - 7.500    Pco2 Temp Co 33.1 26.0 - 37.0 mmHg    Po2 Temp Cor 61 (L) 64 - 87 mmHg    Specimen Arterial     Action Range Triggered NO     Inst. Qualified Patient YES    CBC with Differential    Collection Time: 11/18/19  4:45 AM   Result Value Ref Range    WBC 24.6 (H) 4.8 - 10.8 K/uL    RBC 2.44 (L) 4.20 - 5.40 M/uL    Hemoglobin 8.0 (L) 12.0 - 16.0 g/dL    Hematocrit 24.7 (L) 37.0 - 47.0 %    .2 (H) 81.4 - 97.8 fL    MCH 32.8 27.0 - 33.0 pg    MCHC 32.4 (L) 33.6 - 35.0 g/dL    RDW 73.6 (H) 35.9 - 50.0 fL    Platelet Count 129 (L) 164 - 446 K/uL    MPV 10.6 9.0 - 12.9 fL    Neutrophils-Polys 85.70 (H) 44.00 - 72.00 %    Lymphocytes 4.50 (L) 22.00 - 41.00 %    Monocytes 7.20 0.00 - 13.40 %    Eosinophils 0.00 0.00 - 6.90 %    Basophils 0.20 0.00 - 1.80 %    Immature Granulocytes 2.40 (H) 0.00 - 0.90 %    Nucleated RBC 0.20 /100 WBC    Neutrophils (Absolute) 21.07 (H) 2.00 - 7.15 K/uL    Lymphs (Absolute) 1.11 1.00 - 4.80 K/uL    Monos (Absolute) 1.76 (H) 0.00 - 0.85 K/uL    Eos (Absolute) 0.00 0.00 - 0.51 K/uL    Baso (Absolute) 0.04 0.00 - 0.12 K/uL    Immature Granulocytes (abs) 0.59 (H) 0.00 - 0.11 K/uL    NRBC (Absolute) 0.05 K/uL   Magnesium    Collection Time: 11/18/19  4:45 AM   Result Value Ref Range    Magnesium 2.2 1.5 - 2.5 mg/dL   Phosphorus    Collection Time: 11/18/19  4:45 AM   Result Value Ref Range    Phosphorus 3.5 2.5 - 4.5 mg/dL   Comp Metabolic Panel    Collection Time: 11/18/19  4:45 AM   Result Value Ref Range    Sodium 146 (H) 135 - 145 mmol/L    Potassium 2.9 (L) 3.6 - 5.5 mmol/L    Chloride 113 (H) 96 - 112 mmol/L    Co2 23 20 - 33 mmol/L    Anion Gap 10.0 0.0 - 11.9    Glucose 172 (H) 65 - 99 mg/dL    Bun 36 (H) 8 - 22 mg/dL    Creatinine 1.30 0.50 - 1.40 mg/dL    Calcium 9.7 8.5 -  10.5 mg/dL    AST(SGOT) 61 (H) 12 - 45 U/L    ALT(SGPT) 32 2 - 50 U/L    Alkaline Phosphatase 93 30 - 99 U/L    Total Bilirubin 8.2 (H) 0.1 - 1.5 mg/dL    Albumin 3.3 3.2 - 4.9 g/dL    Total Protein 5.9 (L) 6.0 - 8.2 g/dL    Globulin 2.6 1.9 - 3.5 g/dL    A-G Ratio 1.3 g/dL   PERIPHERAL SMEAR REVIEW    Collection Time: 11/18/19  4:45 AM   Result Value Ref Range    Peripheral Smear Review see below    ESTIMATED GFR    Collection Time: 11/18/19  4:45 AM   Result Value Ref Range    GFR If  51 (A) >60 mL/min/1.73 m 2    GFR If Non  42 (A) >60 mL/min/1.73 m 2   POTASSIUM SERUM (K)    Collection Time: 11/18/19  9:44 AM   Result Value Ref Range    Potassium 2.8 (L) 3.6 - 5.5 mmol/L   AMMONIA    Collection Time: 11/18/19  9:44 AM   Result Value Ref Range    Ammonia 51 (H) 11 - 45 umol/L       MDM (Assessment and Plan):     Active Hospital Problems    Diagnosis   • Acute respiratory failure with hypoxia (HCC) [J96.01]     Priority: High   • ARDS (adult respiratory distress syndrome) (HCC) [J80]     Priority: High   • Rigors [R68.89]     Priority: High   • GI bleed [K92.2]     Priority: High   • HERBERTH (acute kidney injury) (HCC) [N17.9]     Priority: Medium   • Alcoholic hepatitis with ascites [K70.11]     Priority: Medium   • Portal vein thrombosis [I81]     Priority: Medium   • Hypokalemia [E87.6]     Priority: Low   • Leukocytosis [D72.829]     Priority: Low   • Hepatic encephalopathy (HCC) [K72.90]     Priority: Low   • Anemia associated with acute blood loss [D62]     Priority: Low   • Hypothyroidism [E03.9]     Priority: Low   • Decompensated hepatic cirrhosis (HCC) [K72.90]       Imaging/Procedures Review:    DX-CHEST-PORTABLE (1 VIEW)   Final Result         1.  Pulmonary edema and/or infiltrates are identified, which are stable since the prior exam.      DX-CHEST-PORTABLE (1 VIEW)   Final Result      1.  Endotracheal tube appears appropriately located      2.  Unchanged pulmonary edema       DX-CHEST-PORTABLE (1 VIEW)   Final Result      Development of moderate pulmonary edema      US-ABDOMEN COMPLETE SURVEY   Final Result      1.  There is ascites in the abdomen and pelvis.      2.  Sludge is noted in the gallbladder but no gallstones are identified.      3.  Small left pleural effusion is identified.      4.  Borderline splenomegaly.      5.  Mild dilatation of common bile duct. No intrahepatic ductal dilatation is appreciated.         US-PARACENTESIS, ABD WITH IMAGING   Final Result      1. Ultrasound-guided diagnostic paracentesis of the right lower quadrant of the abdominal wall.      2. 50 mL of fluid withdrawn.      DX-CHEST-PORTABLE (1 VIEW)   Final Result      1.  Left lower lobe atelectasis or pneumonia.      2.  No right lung consolidation.      3.  Right IJV central line in place.          Assessment  - Gastric erosions and shallow gastric ulcers  - Esophageal varices grade 1  - No gastric varix  - Portal hypertensive gastropathy  - Peptic duodenitis  - Alcoholic hepatitis.   - Anemia, acute blood loss.  - Hepatic encephalopathy.  - Ascites.  - Jaundice.  - Portal vein thrombosis, not seen on Abd US 11/15/19 The portal vein is patent with hepatopetal flow. The MPV measures 1.1 cm.  - Leukocytosis, possibly all from her steroids, but this is unclear.    Plan  - Keep Octreotide in the remaining bag until extubation or finish it  - PPI IV Q12h  - Continue IV antibiotics  - Monitor H/H and vitals. Transfusion to keep Hb between 7-9 as needed. Do not over transfuse as it can raise portal pressure and cause further bleeding  - If rebleed, please do NM RBC bleeding scan STAT    Thank you very much for allowing me to participate in the care of your patient.  Please feel free to contact me anytime at [phone].     Lucas Soares M.D.    Core Quality Measures   Reviewed items::  Labs, Medications and Radiology reports reviewed

## 2019-11-18 NOTE — CARE PLAN
Problem: Venous Thromboembolism (VTW)/Deep Vein Thrombosis (DVT) Prevention:  Goal: Patient will participate in Venous Thrombosis (VTE)/Deep Vein Thrombosis (DVT)Prevention Measures  Outcome: PROGRESSING AS EXPECTED     Problem: Fluid Volume:  Goal: Will maintain balanced intake and output  Outcome: PROGRESSING SLOWER THAN EXPECTED  Note:   3+ edema, diuresing fairly well     Problem: Skin Integrity  Goal: Risk for impaired skin integrity will decrease  Outcome: PROGRESSING SLOWER THAN EXPECTED  Note:   IAD present, skin macerated and bleeding.  Frequent incontinence care performed and barrier paste applied

## 2019-11-18 NOTE — DISCHARGE PLANNING
Patient is eligible for Medicaid Meds to Beds at discharge if they have coverage with [place] Medicaid, Medicaid FFS, Medicaid HMO (hospitals), or [place]. This service is provided through the [facility] if orders are received by the pharmacy prior to 4pm Monday through Friday excluding holidays. Preferred pharmacy has been changed to [facility]. Please call [phone] prior to discharge.

## 2019-11-18 NOTE — CARE PLAN
Ventilator Daily Summary    Vent Day # 1    Ventilator settings changed this shift: Decreased FiO2 to 40%    Respiratory Procedures: Bronchoscopy    Plan: Continue current ventilator settings and wean mechanical ventilation as tolerated per physician orders.

## 2019-11-18 NOTE — PROGRESS NOTES
Hospital Medicine Daily Progress Note    Date of Service  11/17/2019    Chief Complaint  59 y.o. female admitted 11/15/2019 with GI Bleeding    Hospital Course    Patient with underlying history of chronic alcoholism, who according to her stopped drinking approximately 1 month ago, has not undergone any kind of alcohol rehab presented and was managed at an outlying facility, [facility].  She presented there with complaints of abdominal distention/generalized fluid overload.  Hemoglobin was secondary on presentation, INR 1.4 and bilirubin of 11.6 with transaminitis.  Hypotensive on presentation there, admitted to the ICU there and managed with a stress dose steroids/vasopressors.  Managed with 7 days of IV Flagyl and Zosyn.  Encephalopathy, started on lactulose and multivitamins.  Monitor for alcohol withdrawal.  Anemia requiring blood product transfusion at the outlying facility.  Findings of portal venous thrombosis on November 12, 2019, prompting them to initiate the patient on IV anticoagulation.  Subsequent drop in hemoglobin, concern for bleeding and patient transferred to this facility for further evaluation of GI bleeding/encephalopathy.      Interval Problem Update  I was called to the patient's bedside after she developed worsening respiratory distress.  On examination the patient was noted to have increased work of breathing despite being on 8 L of oxygen via facemask and bilateral crackles on exam.  She was receiving a unit of blood for hemoglobin less than 7.  I ordered a stat chest x-ray which reveals moderate pulmonary edema.  I discontinued her IV fluids and started her on IV Lasix stat.  I ordered a stat ABG which does reveal some hypoxemia.  I have ordered initiation of BiPAP and will transfer the patient ICU for higher level of care and impending worsening respiratory failure which may require intubation.  I have discussed the case with the intensivist Dr. Diaz.      Consultants/Specialty  Gastroenterology  Critical care    Code Status  Full code    Disposition  Transfer to ICU    Review of Systems  Review of Systems   Constitutional: Positive for malaise/fatigue. Negative for chills and fever.   HENT: Negative for hearing loss.    Eyes: Negative for blurred vision and double vision.   Respiratory: Positive for shortness of breath. Negative for cough, hemoptysis, sputum production and wheezing.    Cardiovascular: Positive for leg swelling. Negative for chest pain, palpitations and PND.   Gastrointestinal: Positive for diarrhea. Negative for abdominal pain, blood in stool, constipation, heartburn, melena, nausea and vomiting.   Genitourinary: Negative for dysuria, flank pain, frequency, hematuria and urgency.   Musculoskeletal: Negative for back pain, falls, joint pain, myalgias and neck pain.   Skin: Negative for itching and rash.        Jaundice   Neurological: Positive for tremors and weakness. Negative for dizziness, tingling, sensory change, speech change, focal weakness, seizures, loss of consciousness and headaches.   Psychiatric/Behavioral: Positive for depression and memory loss. Negative for hallucinations, substance abuse and suicidal ideas. The patient is nervous/anxious. The patient does not have insomnia.         Physical Exam  Temp:  [36.3 °C (97.3 °F)-37 °C (98.6 °F)] 36.3 °C (97.3 °F)  Pulse:  [] 79  Resp:  [11-26] 19  BP: ()/(47-84) 94/47  SpO2:  [91 %-100 %] 97 %    Physical Exam  Constitutional:       Appearance: She is obese. She is ill-appearing.   HENT:      Head: Normocephalic.      Right Ear: External ear normal.      Left Ear: External ear normal.      Nose: Nose normal.      Mouth/Throat:      Mouth: Mucous membranes are moist.   Eyes:      General: Scleral icterus present.      Pupils: Pupils are equal, round, and reactive to light.   Neck:      Musculoskeletal: Normal range of motion.   Cardiovascular:      Rate and Rhythm: Regular  rhythm. Tachycardia present.      Heart sounds: No murmur.   Pulmonary:      Effort: Respiratory distress present.      Breath sounds: Rales present.      Comments: Increased work of breathing  Tachypneic  Diminished bases  Abdominal:      General: Bowel sounds are normal. There is no distension.      Palpations: Abdomen is soft. There is no mass.      Tenderness: There is no tenderness. There is no guarding.   Musculoskeletal:      Right lower leg: Edema present.      Left lower leg: Edema present.   Skin:     General: Skin is dry.      Capillary Refill: Capillary refill takes 2 to 3 seconds.      Coloration: Skin is jaundiced and pale.   Neurological:      Mental Status: She is alert and oriented to person, place, and time.      Cranial Nerves: No cranial nerve deficit.      Comments: Slowed, asterixis   Psychiatric:         Behavior: Behavior normal.         Thought Content: Thought content normal.         Judgment: Judgment normal.         Fluids    Intake/Output Summary (Last 24 hours) at 11/17/2019 2118  Last data filed at 11/17/2019 1800  Gross per 24 hour   Intake 2746.27 ml   Output 1600 ml   Net 1146.27 ml       Laboratory  Recent Labs     11/15/19  2343  11/17/19  0146 11/17/19  0550 11/17/19  1714   WBC 20.8*  --  17.2*  --   --    RBC 2.43*  --  1.87*  --   --    HEMOGLOBIN 7.8*   < > 6.2* 6.9* 7.8*   HEMATOCRIT 24.6*  --  19.5* 21.8* 24.7*   .2*  --  102.1*  --   --    MCH 32.1  --  32.6  --   --    MCHC 31.7*  --  31.9*  --   --    RDW 80.0*  --  79.0*  --   --    PLATELETCT 158*  --  137*  --   --    MPV 10.4  --  9.8  --   --     < > = values in this interval not displayed.     Recent Labs     11/15/19  2343 11/17/19  0146 11/17/19  1142 11/17/19  1940   SODIUM 141 146*  --   --    POTASSIUM 3.1* 2.3* 2.4* 2.3*   CHLORIDE 110 111  --   --    CO2 19* 22  --   --    GLUCOSE 139* 139*  --   --    BUN 42* 38*  --   --    CREATININE 1.52* 1.28  --   --    CALCIUM 9.4 9.9  --   --      Recent Labs      11/15/19  2343 11/17/19  0146 11/17/19  0833   APTT 42.2* 40.4* 36.9*   INR 1.61* 1.77* 1.73*         Recent Labs     11/17/19  0550   TRIGLYCERIDE 91       Imaging  DX-CHEST-PORTABLE (1 VIEW)   Final Result      1.  Endotracheal tube appears appropriately located      2.  Unchanged pulmonary edema      DX-CHEST-PORTABLE (1 VIEW)   Final Result      Development of moderate pulmonary edema      US-ABDOMEN COMPLETE SURVEY   Final Result      1.  There is ascites in the abdomen and pelvis.      2.  Sludge is noted in the gallbladder but no gallstones are identified.      3.  Small left pleural effusion is identified.      4.  Borderline splenomegaly.      5.  Mild dilatation of common bile duct. No intrahepatic ductal dilatation is appreciated.         US-PARACENTESIS, ABD WITH IMAGING   Final Result      1. Ultrasound-guided diagnostic paracentesis of the right lower quadrant of the abdominal wall.      2. 50 mL of fluid withdrawn.      DX-CHEST-PORTABLE (1 VIEW)   Final Result      1.  Left lower lobe atelectasis or pneumonia.      2.  No right lung consolidation.      3.  Right IJV central line in place.      DX-CHEST-PORTABLE (1 VIEW)    (Results Pending)        Assessment/Plan  * GI bleed- (present on admission)  Assessment & Plan  In the setting of ETOH hepatitis / Cirrhosis   Suspect EV bleeding  Continue Protonix gtt, octreotide gtt  Continue ceftriaxone for prevention of SBP and 2/2 bleeding   Limit IV fluid hydration at this time  Maintain two large bore IV access / Type and screen at all times   Check TEG with platelet mapping   Monitor HD parameters closely / VS closely   GI has been consulted  Monitor Hb / Restrictive transfusion strategy  Transfer to ICU    Acute respiratory failure with hypoxia (HCC)- (present on admission)  Assessment & Plan  Likely secondary to acute pulmonary edema, secondary CHF versus ARDS versus TRALI  Increased work of breathing and hypoxemia despite being on 8 L oxygen via  facemask  I will start the patient on BiPAP and transferred to ICU.  We will have low threshold for intubation  I discussed the case with intensivist  I have started the patient on IV Lasix  Fluid restriction      HERBERTH (acute kidney injury) (HCC)- (present on admission)  Assessment & Plan  ? Concern for HRS   Check urine Na, Cr  Check US abdomen  R/O SBP / Infectious concerns   Albumin 1g/kg x 2 days   Continue octreotide   Add midodrine   Monitor renal function / Avoid nephrotoxins and dose medications renally    Portal vein thrombosis- (present on admission)  Assessment & Plan  Anticoagulation contraindicated with acute GI bleeding   Gastroenterology consulted   Further recommendations per gastroenterology team   Discussed with Dr Sutherland from gastroenterology 11/16/19    Alcoholic hepatitis with ascites- (present on admission)  Assessment & Plan  Suspect underlying cirrhosis   MELD 11/16/19 -> 24 with a 3 month mortality of 19.6  MELD ETOH Hepatitis 11/16/19 -> 24 with a 3 month mortality of 39%    MDF on presentation to [facility] 11/15/19 ~ 40 - Continue on steroids (Uncertain when these were started)   Will monitor bilirubin / INR for improvement with store (Obtain data / records from OSH to calculate [place] sore to see if ongoing benefits from corticosteroid - need to continue this?)    Optimize nutrition   GIB management as reviewed   HRS ? Continue management as reviewed   Encephalopathy - Rifaximin, Lactulose and Zinc  Paracentesis requested to r/o SBP   PV thrombosis - Gastroenterology input awaited     Gastroenterology to consult   Defer further recommendations to gastroenterology team    Poor prognosis - high risk of mortality because of accompanying GI bleeding / Concern for HRS and PV thrombosis accompnying current disease process. Discussed with patient / her parents at bedside. FULL CODE status at this time. Recommend palliative consultation for advance care planning.     Leukocytosis- (present on  admission)  Assessment & Plan  Likely secondary to steroids  Infectious work up - check blood cultures x 2 / UA  R/O SBP - paracentesis requested     Hypokalemia- (present on admission)  Assessment & Plan  Monitor / replace PRN     Anemia associated with acute blood loss- (present on admission)  Assessment & Plan  Secondary to acute upper GI bleeding  I ordered stat 1 unit PRBC transfusion for Hb of 6.2  Monitor Hb / Restrictive transfusion strategy    Hepatic encephalopathy (HCC)- (present on admission)  Assessment & Plan  Continue lactulose, rifaximin and zinc    Hypothyroidism- (present on admission)  Assessment & Plan  Continue Synthroid 50 mcg, TSH was checked on presentation       VTE prophylaxis: SCDs    Patient is critically ill with alcoholic hepatitis, hepatic encephalopathy, GI bleed requiring transfuse, HERBERTH and acute respiratory failure with hypoxia secondary to acute pulmonary edema secondary to ARDS versus TACO vs Acute CHF. I have started the patient on BiPAP therapy and IV Lasix.  I will transfer the patient to ICU.    The vital organ system that is affected is GI, cardiovascular, pulmonary, CNS  If untreated there is a high chance of deterioration into cardiopulmonary failure and eventually death.     The critical care that I am providing today is  initiating BiPAP therapy, starting on IV Lasix, blood transfusion for hemoglobin of 6.2, frequent monitoring of hemodynamics, blood chemistry.  I discussed the case with the intensivist, RT and nursing staff.  The critical that has been undertaken is medically complex.   There has been no overlap in critical care time.   Critical Care Time not including procedures: 40 minutes

## 2019-11-18 NOTE — PALLIATIVE CARE
Palliative Care follow-up  Consult received and EMR reviewed; case discussed with BS RN. Appreciate updates on current situation and family dynamics. No family currently at BS and team planning to attempt breathing trail for possible extubation soon. Will place a call to pt's parents to touch bases and provide support. Hopeful pt will be able to participate in her POC in the near future.    Updated: BS RN    Plan: full consult to follow    Thank you for allowing Palliative Care to participate in this patient's care. Please feel free to call [phone] with any questions or concerns.

## 2019-11-18 NOTE — PROGRESS NOTES
Critical Care Progress Note    Date of admission  11/15/2019    Chief Complaint  59 y.o. female admitted 11/15/2019 with respiratory failure    Hospital Course    59 y.o. female who presented 11/15/2019 with h/o alcoholic cirrhosis who presented to OSH with decompensated cirrhosis.  Treated with zosyn and flagyl x7days, was hypotensive in shock.  Found to have a portal vein thrombus and was treated with a heparin gtt.  Also treated for EtOH withdrawal. Developed GIB so was transferred to [facility] for high level of care.  Admitted yesterday and treated with octreotide and PPI.  Overnight pt's Hgb dropped and she was given a unit of blood.  She developed respiratory distress, which per RN reports started before the blood transfusion was initiated.  When I evaluated the patient she was in significant respiratory distress with tachypnea, increase WOB, diffuse ronchi and wheezes.  Requiring facemask oxygen. She was also rigoring and confused.  Brought to ICU where I intubated her for respiratory distress and because she'll need an EGD today and would not be safe for EGD without airway protection.  I also stopped the blood transfusion and had it sent back to the lab for evaluation of transfusion reaction given her rigors.  Prior to intubation patient gave limited history but denied abdominal pain or chest pain, just reported dyspnea.  She was oriented x2 (location and self).       Interval Problem Update  Reviewed last 24 hour events:  T-max 97.7  -240 cc over last 24 hours, +1.5 L since admit  CXR with diffuse bilateral hazy opacity  WBC 17- >24  K2.9  ABG 7.4 1/34/64/93 on 40    BC X 11/17 NGTD  BAL 11/17 NGTD  MRSA swab 11/17 NEG  Ascites 11/16 NGTD  BC X 11/16 NGTD    Unasyn 11/17-P  Ceftriaxone 11/15-16    Fentanyl@75  Propofol@20   Octreotide@50  Norepinephrine@5    Last BM 11/17    Review of Systems  Review of Systems   Unable to perform ROS: Intubated        Vital Signs for last 24 hours   Temp:  [36.2 °C (97.2  °F)-36.6 °C (97.9 °F)] 36.2 °C (97.2 °F)  Pulse:  [75-94] 86  Resp:  [12-28] 23  BP: ()/(44-83) 90/51  SpO2:  [93 %-100 %] 98 %    Hemodynamic parameters for last 24 hours       Respiratory Information for the last 24 hours  Oh Vent Mode: APVCMV  Rate (breaths/min): 20  Vt Target (mL): 360  PEEP/CPAP: 8  FiO2: 50  P MEAN: 13  Control VTE (exp VT): 360    Physical Exam   Physical Exam  Vitals signs and nursing note reviewed.   Constitutional:       Appearance: She is ill-appearing. She is not diaphoretic.   HENT:      Head: Normocephalic and atraumatic.   Eyes:      General: Scleral icterus present.      Pupils: Pupils are equal, round, and reactive to light.   Neck:      Musculoskeletal: Neck supple.   Cardiovascular:      Rate and Rhythm: Normal rate.      Pulses: Normal pulses.   Pulmonary:      Breath sounds: No wheezing or rales.   Abdominal:      General: There is distension.      Tenderness: There is no tenderness. There is no guarding.   Musculoskeletal:      Right lower leg: Edema present.      Left lower leg: Edema present.   Skin:     General: Skin is warm and dry.      Coloration: Skin is jaundiced.   Neurological:      Cranial Nerves: No cranial nerve deficit.   Psychiatric:      Comments: Sedate         Medications  Current Facility-Administered Medications   Medication Dose Route Frequency Provider Last Rate Last Dose   • potassium chloride (KCL) ivpb 10 mEq  10 mEq Intravenous Once Scott Novak M.D.       • ipratropium-albuterol (DUONEB) nebulizer solution  3 mL Nebulization Q4HRS (RT) Howie Barraza M.D.   3 mL at 11/18/19 0707   • furosemide (LASIX) injection 40 mg  40 mg Intravenous BID DIURETIC Amol Titus M.D.   40 mg at 11/18/19 0515   • Respiratory Care per Protocol   Nebulization Continuous RT Ambreen Diaz M.D.       • ipratropium-albuterol (DUONEB) nebulizer solution  3 mL Nebulization Q2HRS PRN (RT) Ambreen Diaz M.D.       • MD Alert...ICU Electrolyte Replacement per  Pharmacy   Other PHARMACY TO DOSE Ambreen Diaz M.D.       • lidocaine (XYLOCAINE) 1 % injection 1-2 mL  1-2 mL Tracheal Tube Q30 MIN PRN Ambreen Diaz M.D.   2 mL at 11/17/19 0603   • pantoprazole (PROTONIX) injection 40 mg  40 mg Intravenous BID Ambreen Diaz M.D.   40 mg at 11/18/19 0515   • ondansetron (ZOFRAN ODT) dispertab 4 mg  4 mg Enteral Tube Q4HRS PRN Ambreen Diaz M.D.       • levothyroxine (SYNTHROID) tablet 50 mcg  50 mcg Enteral Tube AM ES Ambreen Diaz M.D.   Stopped at 11/17/19 0600   • phytonadione (MEPHYTON) tablet 10 mg  10 mg Enteral Tube DAILY Ambreen Diaz M.D.   Stopped at 11/17/19 0600   • zinc sulfate (ZINCATE) capsule 220 mg  220 mg Enteral Tube Q12HRS Ambreen Diaz M.D.   Stopped at 11/17/19 0600   • norepinephrine (LEVOPHED) 8 mg in  mL Infusion  0-30 mcg/min Intravenous Continuous Marlon Zavala M.D. 9.4 mL/hr at 11/17/19 2136 5 mcg/min at 11/17/19 2136   • propofol (DIPRIVAN) injection  0-80 mcg/kg/min Intravenous Continuous Marlon Zavala M.D. 12.4 mL/hr at 11/18/19 0709 20 mcg/kg/min at 11/18/19 0709   • fentaNYL (SUBLIMAZE) 50 mcg/mL in 50mL (Continuous Infusion)   Intravenous Continuous Marlon Zavala M.D. 1.5 mL/hr at 11/18/19 0449 75 mcg/hr at 11/18/19 0449   • fentaNYL (SUBLIMAZE) injection  mcg   mcg Intravenous Q HOUR PRN Marlon Zavala M.D.       • ampicillin/sulbactam (UNASYN) 3 g in  mL IVPB  3 g Intravenous Q6HRS Marlon Zavala M.D.   Stopped at 11/18/19 0545   • K+ Scale: Goal of 4.5  1 Each Intravenous Q6HRS Marlon Zavala M.D.   1 Each at 11/18/19 0625   • ondansetron (ZOFRAN) syringe/vial injection 4 mg  4 mg Intravenous Q4HRS PRN Amol Titus M.D.       • octreotide (SANDOSTATIN) 1,250 mcg in  mL Infusion  50 mcg/hr Intravenous Continuous Amol Titus M.D. 10 mL/hr at 11/18/19 0119 50 mcg/hr at 11/18/19 0119   • influenza vaccine quad injection 0.5 mL  0.5 mL Intramuscular Once PRN Ezio Louie M.D.            Fluids    Intake/Output Summary (Last 24 hours) at 11/18/2019 0826  Last data filed at 11/18/2019 0600  Gross per 24 hour   Intake 2094.62 ml   Output 2450 ml   Net -355.38 ml       Laboratory  Recent Labs     11/17/19  0428 11/17/19  1235 11/18/19  0418   ISTATAPH 7.413 7.380* 7.417   ISTATAPCO2 32.6 33.5 34.3   ISTATAPO2 65 106* 64   ISTATATCO2 22 21 23   [id number] 93 98 93   ISTATARTHCO3 20.8 19.8 22.1   ISTATARTBE -4 -5* -2   ISTATTEMP 98.7 F 36.4 C 36.2 C   ISTATFIO2 50 60 40   ISTATSPEC Arterial Arterial Arterial   ISTATAPHTC 7.412 7.389* 7.429   [id number] 65 102* 61*         Recent Labs     11/15/19  2343 11/16/19  0604 11/17/19 0146 11/17/19  1940 11/18/19  0020 11/18/19  0445   SODIUM 141  --  146*  --   --   --  146*   POTASSIUM 3.1*  --  2.3*   < > 2.3* 2.6* 2.9*   CHLORIDE 110  --  111  --   --   --  113*   CO2 19*  --  22  --   --   --  23   BUN 42*  --  38*  --   --   --  36*   CREATININE 1.52*  --  1.28  --   --   --  1.30   MAGNESIUM  --  2.1 2.1  --   --   --  2.2   PHOSPHORUS  --   --  4.0  --   --   --  3.5   CALCIUM 9.4  --  9.9  --   --   --  9.7    < > = values in this interval not displayed.     Recent Labs     11/15/19  2343 11/17/19  0146 11/18/19  0445   ALTSGPT 26 28 32   ASTSGOT 69* 64* 61*   ALKPHOSPHAT 149* 99 93   TBILIRUBIN 9.4* 7.7* 8.2*   GLUCOSE 139* 139* 172*     Recent Labs     11/15/19  2343 11/17/19  0146 11/18/19  0445   WBC 20.8* 17.2* 24.6*   NEUTSPOLYS 82.60* 80.90* 85.70*   LYMPHOCYTES 13.00* 10.40* 4.50*   MONOCYTES 4.40 8.70 7.20   EOSINOPHILS 0.00 0.00 0.00   BASOPHILS 0.00 0.00 0.20   ASTSGOT 69* 64* 61*   ALTSGPT 26 28 32   ALKPHOSPHAT 149* 99 93   TBILIRUBIN 9.4* 7.7* 8.2*     Recent Labs     11/15/19  2343  11/17/19  0146  11/17/19  0833 11/17/19  1714 11/18/19  0020 11/18/19  0445   RBC 2.43*  --  1.87*  --   --   --   --  2.44*   HEMOGLOBIN 7.8*   < > 6.2*   < >  --  7.8* 7.8* 8.0*   HEMATOCRIT 24.6*  --  19.5*   < >  --  24.7* 24.7* 24.7*   PLATELETCT  158*  --  137*  --   --   --   --  129*   PROTHROMBTM 19.6*  --  21.1*  --  20.8*  --   --   --    APTT 42.2*  --  40.4*  --  36.9*  --   --   --    INR 1.61*  --  1.77*  --  1.73*  --   --   --    IRON 113  --   --   --   --   --   --   --    TOTIRONBC 119*  --   --   --   --   --   --   --     < > = values in this interval not displayed.       Imaging  X-Ray:  I have personally reviewed the images and compared with prior images.    Assessment/Plan  * GI bleed- (present on admission)  Assessment & Plan  Varices vs portal hypertensive gastropathy vs other  Conservative transfusion strategy  DC octreotide  Continue Protonix  Continue antibiotics for SBP prophylaxis  Status post EGD 11/17 with grade 1 esophageal varices    ARDS (adult respiratory distress syndrome) (HCC)  Assessment & Plan  Ddx pneumonia, TRALI, aspiration  Continue full mechanical ventilatory support  Follow-up BAL  Continue antibiotics  Lung protective strategies    Acute respiratory failure with hypoxia (HCC)- (present on admission)  Assessment & Plan  Intubated 11/17  Continue full mechanical ventilatory support  I am adjusting ventilator based on ABG pulmonary mechanics  RT/O2 protocols  Continue aspiration coverage  Follow-up BAL  Maintain euvolemic balance    HERBERTH (acute kidney injury) (HCC)- (present on admission)  Assessment & Plan  Trending towards improvement  Renally dose medications minimize nephrotoxic substances  Monitor urine output    Portal vein thrombosis- (present on admission)  Assessment & Plan  Anti-coagulation contraindicated, and indeed, not typically indicated in patients with cirrhosis unless there is evidence of gut ischemia    Alcoholic hepatitis with ascites- (present on admission)  Assessment & Plan  Discriminant function 44.8  Not currently on prednisone secondary to concern of GI bleed  Check ammonia level  Minimize hepatotoxic substances  Recommend complete EtOH cessation    Decompensated hepatic cirrhosis  (HCC)  Assessment & Plan  Reason for decompensation may be portal vein thrombus vs EtOH use.  Was treated for an infection at OSH.  No SBP on paracentesis.      Hypokalemia- (present on admission)  Assessment & Plan  Replace to keep greater than 4    Anemia associated with acute blood loss- (present on admission)  Assessment & Plan  Transfuse <7    Hepatic encephalopathy (HCC)- (present on admission)  Assessment & Plan  Lactulose, rifaximin    Hypothyroidism- (present on admission)  Assessment & Plan  levothyroxine       VTE:  Contraindicated  Ulcer: PPI  Lines: Central Line  Ongoing indication addressed    I have performed a physical exam and reviewed and updated ROS and Plan today (11/18/2019). In review of yesterday's note (11/17/2019), there are no changes except as documented above.     Discussed patient condition and risk of morbidity and/or mortality with Hospitalist, RN, RT and Pharmacy  The patient remains critically ill.  Critical care time = 37 minutes in directly providing and coordinating critical care and extensive data review.  No time overlap and excludes procedures.

## 2019-11-18 NOTE — CARE PLAN
Problem: Bowel/Gastric:  Goal: Will not experience complications related to bowel motility  Outcome: PROGRESSING AS EXPECTED  Intervention: Assess baseline bowel pattern  Note:   Normoactive x4  Intervention: Implement interventions to promote bowel evacuation if inadequate bowel movements in past 48 hours  Note:   Senna, fluids, ambulation, lactulose  Intervention: Implement Bowel Protocol, if applicable  Note:   Will implement if needed. Not currently needed     Problem: Pain Management  Goal: Pain level will decrease to patient's comfort goal  Outcome: PROGRESSING AS EXPECTED  Intervention: Follow pain managment plan developed in collaboration with patient and Interdisciplinary Team  Note:   Fentanyl drip   Intervention: Educate and implement non-pharmacologic comfort measures. Examples: relaxation, distration, play therapy, activity therapy, massage, etc.  Note:   Rest, repositioned, distraction, music, tv, blanket, extra pillows     Problem: Bowel/Gastric:  Goal: Will not experience complications related to bowel motility  Outcome: PROGRESSING AS EXPECTED  Intervention: Assess baseline bowel pattern  Note:   Normoactive x4  Intervention: Implement interventions to promote bowel evacuation if inadequate bowel movements in past 48 hours  Note:   Senna, fluids, ambulation, lactulose  Intervention: Implement Bowel Protocol, if applicable  Note:   Will implement if needed. Not currently needed     Problem: Pain Management  Goal: Pain level will decrease to patient's comfort goal  Outcome: PROGRESSING AS EXPECTED  Intervention: Follow pain managment plan developed in collaboration with patient and Interdisciplinary Team  Note:   Fentanyl drip   Intervention: Educate and implement non-pharmacologic comfort measures. Examples: relaxation, distration, play therapy, activity therapy, massage, etc.  Note:   Rest, repositioned, distraction, music, tv, blanket, extra pillows

## 2019-11-19 NOTE — PROGRESS NOTES
Gastroenterology Progress Note:    Ting-Jenn Fany  Date & Time note created:    11/19/2019   3:59 PM     Patient ID:  Name:             Emily Torres    YOB: 1960  Age:                 59 y.o.  female  MRN:               [id number]    Referring MD:  Dr. Titus                                                             Chief Complaint(s):      Anemia, GI bleeding    History of Present Illness:    This is a very pleasant 59 y.o. female with the past medical history as listed below.  ===Dr. Sutherland's consult 11/17/19===  Patient is a 59-year-old female transferred here from [facility] where she presented originally with abdominal distention.  She was noted to have ascites and jaundice  at that point and diagnosed with alcohol abuse, but she had quit about a month before this.  She denied any abdominal pain at that time, she was diagnosed with the portal vein thrombosis there and started on anticoagulants.  However, her hemoglobin began to drop and there were reports of melena and hence her transfer here yesterday.  Last night, she had further drop in her hemoglobin as well as   respiratory distress.  She received a bronchoscopy as well as intubation and currently is in the ICU.  She is unable to give any history, so my history now is gleaned from discussion with Dr. Titus, the current ICU rounding team, and nursing and we are being asked to evaluate for her liver and GI bleeding issues.  Of note, her discriminant function is calculated at 52.  MELD score 24.  ===  11/17/2019 EGD:  Esophagus:   - EV, Grade I no red signs  - LA-B reflux esophagitis  Stomach:  - Gastric erosions and shallow gastric ulcers in prepyloric antrum  - No gastric varix  - Diffuse moderate portal hypertensive gastropathy  Duodenum:   - Mild duodenitis  No fresh blood nor blood clot was seen in the entire exam.    11/18/2019 May be extubated today. No melena or hematochezia per nurse. Otherwise ok.   11/19/2019 Stable. Still  intubated. Last BM yesterday.      Review of Systems:      Unable to review. Intubated and sedated    Past Medical History:   Past Medical History:   Diagnosis Date   • Dyslipidemia 5/15/2014    Recently started working out with  - 3 weeks ago ,  Will try lifestyle x 3-4 months    • GERD (gastroesophageal reflux disease)    • Hypertension    • Hypothyroid 5/15/2014    TSH 3.82 - repeat in Sept   • Lateral epicondylitis of right elbow 3/17/2017   • Primary insomnia 8/24/2016   • Screening 3/17/2015   • Unspecified vitamin D deficiency 5/15/2014    5 uncorrected. Start 50,000IU daily.      Active Hospital Problems    Diagnosis   • Acute respiratory failure with hypoxia (HCC) [J96.01]     Priority: High   • ARDS (adult respiratory distress syndrome) (HCC) [J80]     Priority: High   • Rigors [R68.89]     Priority: High   • GI bleed [K92.2]     Priority: High   • HERBERTH (acute kidney injury) (HCC) [N17.9]     Priority: Medium   • Alcoholic hepatitis with ascites [K70.11]     Priority: Medium   • Portal vein thrombosis [I81]     Priority: Medium   • Hypokalemia [E87.6]     Priority: Low   • Leukocytosis [D72.829]     Priority: Low   • Hepatic encephalopathy (HCC) [K72.90]     Priority: Low   • Anemia associated with acute blood loss [D62]     Priority: Low   • Hypothyroidism [E03.9]     Priority: Low   • Decompensated hepatic cirrhosis (HCC) [K72.90]       Past Surgical History:  Past Surgical History:   Procedure Laterality Date   • GASTROSCOPY-ENDO  11/17/2019    Procedure: GASTROSCOPY;  Surgeon: Lucas Soares M.D.;  Location: ENDOSCOPY [facility];  Service: Gastroenterology   • HEMORRHOIDECTOMY         Hospital Medications:  Current Facility-Administered Medications   Medication Dose Frequency Provider Last Rate Last Dose   • lactulose 20 GM/30ML solution 30 mL  30 mL TID Scott Novak M.D.   30 mL at 11/19/19 1202   • riFAXIMin (XIFAXAN) tablet 550 mg  550 mg BID Scott Novak M.D.   550  mg at 11/19/19 1036   • Pharmacy Consult: Enteral tube insertion - review meds/change route/product selection  1 Each PHARMACY TO DOSE Scott Novak M.D.       • potassium chloride in water (KCL) ivpb **Administer in ICU only** 20 mEq  20 mEq Once Scott Novak M.D. 50 mL/hr at 11/19/19 1424 20 mEq at 11/19/19 1424   • dexmedetomidine (PRECEDEX) 400 mcg/100mL NS premix infusion  0.1-1.5 mcg/kg/hr Continuous Scott Novak M.D. 9.8 mL/hr at 11/19/19 1418 0.4 mcg/kg/hr at 11/19/19 1418   • ipratropium-albuterol (DUONEB) nebulizer solution  3 mL Q4HRS (RT) Howie Barraza M.D.   3 mL at 11/19/19 1448   • furosemide (LASIX) injection 40 mg  40 mg BID DIURETIC Amol Titus M.D.   40 mg at 11/19/19 0508   • Respiratory Care per Protocol   Continuous RT Ambreen Diaz M.D.       • ipratropium-albuterol (DUONEB) nebulizer solution  3 mL Q2HRS PRN (RT) Ambreen Diaz M.D.       • MD Alert...ICU Electrolyte Replacement per Pharmacy   PHARMACY TO DOSE Ambreen Diaz M.D.       • lidocaine (XYLOCAINE) 1 % injection 1-2 mL  1-2 mL Q30 MIN PRN Ambreen Diaz M.D.   2 mL at 11/17/19 0603   • pantoprazole (PROTONIX) injection 40 mg  40 mg BID Ambreen Diaz M.D.   40 mg at 11/19/19 0512   • ondansetron (ZOFRAN ODT) dispertab 4 mg  4 mg Q4HRS PRN Ambreen Diaz M.D.       • levothyroxine (SYNTHROID) tablet 50 mcg  50 mcg AM ES Ambreen Diaz M.D.   Stopped at 11/17/19 0600   • zinc sulfate (ZINCATE) capsule 220 mg  220 mg Q12HRS Ambreen Diaz M.D.   Stopped at 11/17/19 0600   • norepinephrine (LEVOPHED) 8 mg in  mL Infusion  0-30 mcg/min Continuous Marlon Zavala M.D. 15 mL/hr at 11/19/19 0917 8 mcg/min at 11/19/19 0917   • fentaNYL (SUBLIMAZE) injection  mcg   mcg Q HOUR PRN Marlon Zavala M.D.       • ampicillin/sulbactam (UNASYN) 3 g in  mL IVPB  3 g Q6HRS Marlon Zavala M.D.   Stopped at 11/19/19 1232   • K+ Scale: Goal of 4.5  1 Each Q6HRS Marlon Zavala M.D.   1 Each at 11/19/19 1255   •  "ondansetron (ZOFRAN) syringe/vial injection 4 mg  4 mg Q4HRS PRN Amol Titus M.D.       • influenza vaccine quad injection 0.5 mL  0.5 mL Once PRN Ezio Louie M.D.       Last reviewed on 6/7/2019  9:27 AM by Aditya Hayward Ass't      Current Outpatient Medications:  Medications Prior to Admission   Medication Sig Dispense Refill Last Dose   • levothyroxine (SYNTHROID) 50 MCG Tab TAKE 1 TAB BY MOUTH EVERY MORNING ON AN EMPTY STOMACH. 90 Tab 0 11/15/2019 at Unknown time   • lisinopril-hydrochlorothiazide (PRINZIDE, ZESTORETIC) 20-25 MG per tablet Take 1 Tab by mouth every day. TAKE 1 TAB BY MOUTH EVERY DAY. 90 Tab 1 11/15/2019 at Unknown time   • vitamin D, Ergocalciferol, (DRISDOL) 22452 units Cap capsule Take 1 Cap by mouth every 7 days. 12 Cap 0 11/15/2019 at Unknown time   • omeprazole (PRILOSEC) 20 MG delayed-release capsule Take 1 Cap by mouth every day. 90 Cap 1 11/15/2019 at Unknown time   • fluticasone (FLOVENT HFA) 44 MCG/ACT AERO Inhale 2 Puffs by mouth 2 times a day. 1 Inhaler 3 11/15/2019 at Unknown time   • albuterol (VENTOLIN OR PROVENTIL) 108 (90 BASE) MCG/ACT AERS Inhale 2 Puffs by mouth every 6 hours as needed for Shortness of Breath. 8.5 g 3 11/15/2019 at Unknown time       Medication Allergy:  Allergies   Allergen Reactions   • Shellfish Allergy        Physical Exam:  Weight/BMI: Body mass index is 34.6 kg/m².  /54   Pulse 72   Temp 36.9 °C (98.4 °F) (Bladder)   Resp 20   Ht 1.676 m (5' 5.98\")   Wt 97.2 kg (214 lb 4.6 oz)   SpO2 100%   Vitals:    11/19/19 1445 11/19/19 1500 11/19/19 1515 11/19/19 1530   BP: 109/55 110/53 101/58 105/54   Pulse: 63 70 69 72   Resp: 20 20 20 20   Temp:       TempSrc:       SpO2:       Weight:       Height:         Oxygen Therapy:  Pulse Oximetry: 100 %, O2 Delivery: Ventilator    Intake/Output Summary (Last 24 hours) at 11/19/2019 1559  Last data filed at 11/19/2019 1400  Gross per 24 hour   Intake 1493.57 ml   Output 3245 ml   Net -1751.43 " ml       Constitutional:   Well developed, well nourished, in acute distress, intubated  HEENT:  Normocephalic, Atraumatic, Conjunctiva not pale, Sclera icteric, Oropharynx moist mucous membranes, No oral exudates, Nose normal.  No thyromegaly.  Neck:  Normal range of motion, No cervical tenderness,  no JVD.  Chest/Lungs:  Symmetric expansion, no spider angioma, breath sounds clear to auscultation bilaterally,  no crackles, no wheezing.   Cardiovascular:  Normal heart rate, Normal rhythm, No murmurs, No rubs, No gallops.    Abdomen: Bowel sounds normal, Soft, No tenderness, No guarding, No rebound, No masses, No hepatosplenomegaly.  Extremities: No cyanosis/clubbing/edema/palmar erythema/flapping tremor  Skin: Warm, Dry, No erythema, No rash, no induration.  Physical Exam    MDM (Data Review):     Records reviewed and summarized in current documentation    Lab Data Review:  Recent Results (from the past 24 hour(s))   POTASSIUM SERUM (K)    Collection Time: 11/18/19  6:28 PM   Result Value Ref Range    Potassium 3.2 (L) 3.6 - 5.5 mmol/L   HEMOGLOBIN AND HEMATOCRIT    Collection Time: 11/19/19 12:00 AM   Result Value Ref Range    Hemoglobin 8.3 (L) 12.0 - 16.0 g/dL    Hematocrit 25.7 (L) 37.0 - 47.0 %   POTASSIUM SERUM (K)    Collection Time: 11/19/19 12:00 AM   Result Value Ref Range    Potassium 3.2 (L) 3.6 - 5.5 mmol/L   ISTAT VENOUS BLOOD GAS    Collection Time: 11/19/19  4:24 AM   Result Value Ref Range    Ph 7.394 7.310 - 7.450    Pco2 41.9 41.0 - 51.0 mmHg    Po2 24 (L) 25 - 40 mmHg    Tco2 27 20 - 33 mmol/L    SO2 41 %    Hco3 25.6 24.0 - 28.0 mmol/L    BE 1 -4 - 3 mmol/L    Body Temp 36.6 C degrees    O2 Therapy 40 %    iPF Ratio 60     Ph Temp Correc 7.400 7.310 - 7.450    Pco2 Temp Salvador 41.2 41.0 - 51.0 mmHg    Po2 Temp Corre 23 (L) 25 - 40 mmHg    Specimen Venous     Action Range Triggered YES     Inst. Qualified Patient YES    CBC with Differential    Collection Time: 11/19/19  5:20 AM   Result Value Ref  Range    WBC 21.9 (H) 4.8 - 10.8 K/uL    RBC 2.49 (L) 4.20 - 5.40 M/uL    Hemoglobin 8.2 (L) 12.0 - 16.0 g/dL    Hematocrit 26.3 (L) 37.0 - 47.0 %    .6 (H) 81.4 - 97.8 fL    MCH 32.9 27.0 - 33.0 pg    MCHC 31.2 (L) 33.6 - 35.0 g/dL    RDW 75.7 (H) 35.9 - 50.0 fL    Platelet Count 133 (L) 164 - 446 K/uL    MPV 10.3 9.0 - 12.9 fL    Neutrophils-Polys 83.10 (H) 44.00 - 72.00 %    Lymphocytes 6.80 (L) 22.00 - 41.00 %    Monocytes 8.50 0.00 - 13.40 %    Eosinophils 0.00 0.00 - 6.90 %    Basophils 0.00 0.00 - 1.80 %    Nucleated RBC 0.50 /100 WBC    Neutrophils (Absolute) 18.20 (H) 2.00 - 7.15 K/uL    Lymphs (Absolute) 1.49 1.00 - 4.80 K/uL    Monos (Absolute) 1.86 (H) 0.00 - 0.85 K/uL    Eos (Absolute) 0.00 0.00 - 0.51 K/uL    Baso (Absolute) 0.00 0.00 - 0.12 K/uL    NRBC (Absolute) 0.10 K/uL    Anisocytosis 1+     Macrocytosis 2+    Basic Metabolic Panel (BMP)    Collection Time: 11/19/19  5:20 AM   Result Value Ref Range    Sodium 152 (H) 135 - 145 mmol/L    Potassium 3.4 (L) 3.6 - 5.5 mmol/L    Chloride 117 (H) 96 - 112 mmol/L    Co2 25 20 - 33 mmol/L    Glucose 114 (H) 65 - 99 mg/dL    Bun 38 (H) 8 - 22 mg/dL    Creatinine 1.27 0.50 - 1.40 mg/dL    Calcium 9.8 8.5 - 10.5 mg/dL    Anion Gap 10.0 0.0 - 11.9   Magnesium    Collection Time: 11/19/19  5:20 AM   Result Value Ref Range    Magnesium 2.2 1.5 - 2.5 mg/dL   Phosphorus    Collection Time: 11/19/19  5:20 AM   Result Value Ref Range    Phosphorus 2.7 2.5 - 4.5 mg/dL   ESTIMATED GFR    Collection Time: 11/19/19  5:20 AM   Result Value Ref Range    GFR If  52 (A) >60 mL/min/1.73 m 2    GFR If Non  43 (A) >60 mL/min/1.73 m 2   DIFFERENTIAL MANUAL    Collection Time: 11/19/19  5:20 AM   Result Value Ref Range    Metamyelocytes 0.80 %    Myelocytes 0.80 %    Manual Diff Status PERFORMED    PERIPHERAL SMEAR REVIEW    Collection Time: 11/19/19  5:20 AM   Result Value Ref Range    Peripheral Smear Review see below    PLATELET  ESTIMATE    Collection Time: 11/19/19  5:20 AM   Result Value Ref Range    Plt Estimation Decreased    MORPHOLOGY    Collection Time: 11/19/19  5:20 AM   Result Value Ref Range    RBC Morphology Present     Giant Platelets 1+     Polychromia 1+     Poikilocytosis 1+     Ovalocytes 1+     Schistocytes 1+     Target Cells 1+     Echinocytes 1+     Spherocytes 1+     Smudge Cells Moderate     Reactive Lymphocytes Few    HEMOGLOBIN AND HEMATOCRIT    Collection Time: 11/19/19 12:09 PM   Result Value Ref Range    Hemoglobin 8.7 (L) 12.0 - 16.0 g/dL    Hematocrit 26.5 (L) 37.0 - 47.0 %   POTASSIUM SERUM (K)    Collection Time: 11/19/19 12:09 PM   Result Value Ref Range    Potassium 3.6 3.6 - 5.5 mmol/L       MDM (Assessment and Plan):     Active Hospital Problems    Diagnosis   • Acute respiratory failure with hypoxia (HCC) [J96.01]     Priority: High   • ARDS (adult respiratory distress syndrome) (HCC) [J80]     Priority: High   • Rigors [R68.89]     Priority: High   • GI bleed [K92.2]     Priority: High   • HERBERTH (acute kidney injury) (HCC) [N17.9]     Priority: Medium   • Alcoholic hepatitis with ascites [K70.11]     Priority: Medium   • Portal vein thrombosis [I81]     Priority: Medium   • Hypokalemia [E87.6]     Priority: Low   • Leukocytosis [D72.829]     Priority: Low   • Hepatic encephalopathy (HCC) [K72.90]     Priority: Low   • Anemia associated with acute blood loss [D62]     Priority: Low   • Hypothyroidism [E03.9]     Priority: Low   • Decompensated hepatic cirrhosis (HCC) [K72.90]       Imaging/Procedures Review:    DX-ABDOMEN FOR TUBE PLACEMENT   Final Result      Cortrak feeding tube tip projects in the region of the mid stomach.      DX-CHEST-PORTABLE (1 VIEW)   Final Result         1.  Pulmonary edema and/or infiltrates are identified, which are stable since the prior exam.      DX-CHEST-PORTABLE (1 VIEW)   Final Result         1.  Pulmonary edema and/or infiltrates are identified, which are stable since  the prior exam.      DX-CHEST-PORTABLE (1 VIEW)   Final Result      1.  Endotracheal tube appears appropriately located      2.  Unchanged pulmonary edema      DX-CHEST-PORTABLE (1 VIEW)   Final Result      Development of moderate pulmonary edema      US-ABDOMEN COMPLETE SURVEY   Final Result      1.  There is ascites in the abdomen and pelvis.      2.  Sludge is noted in the gallbladder but no gallstones are identified.      3.  Small left pleural effusion is identified.      4.  Borderline splenomegaly.      5.  Mild dilatation of common bile duct. No intrahepatic ductal dilatation is appreciated.         US-PARACENTESIS, ABD WITH IMAGING   Final Result      1. Ultrasound-guided diagnostic paracentesis of the right lower quadrant of the abdominal wall.      2. 50 mL of fluid withdrawn.      DX-CHEST-PORTABLE (1 VIEW)   Final Result      1.  Left lower lobe atelectasis or pneumonia.      2.  No right lung consolidation.      3.  Right IJV central line in place.          Assessment  - Gastric erosions and shallow gastric ulcers  - Esophageal varices grade 1  - No gastric varix  - Portal hypertensive gastropathy  - Peptic duodenitis  - Alcoholic hepatitis.   - Anemia, acute blood loss.  - Hepatic encephalopathy.  - Ascites.  - Jaundice.  - Portal vein thrombosis, not seen on Abd US 11/15/19 The portal vein is patent with hepatopetal flow. The MPV measures 1.1 cm.  - Leukocytosis, possibly all from her steroids, but this is unclear.    Plan  - PPI IV Q12h  - Continue IV antibiotics  - Monitor H/H and vitals. Transfusion to keep Hb between 7-9 as needed. Do not over transfuse as it can raise portal pressure and cause further bleeding  - If rebleed, please do NM RBC bleeding scan STAT  - Will sign off and stand by. Please contact us again if we can be of further assistance.   - Advise the patient to f/u with [facility] provider about 4 weeks after discharge.    Thank you very much for allowing me to participate in the care of  your patient.  Please feel free to contact me anytime at [phone].     Lucas Soares M.D.    Core Quality Measures   Reviewed items::  Labs, Medications and Radiology reports reviewed

## 2019-11-19 NOTE — CARE PLAN
Problem: Nutritional:  Goal: Nutrition support tolerated and meeting greater than 85% of estimated needs  Outcome: NOT MET   See RD note.

## 2019-11-19 NOTE — CARE PLAN
Ventilator Daily Summary    Vent Day # 2    Ventilator settings changed this shift: Decreased FiO2 to 30%    Weaning trials: SBT x1, did not follow for parameters    Plan: Continue current ventilator settings and wean mechanical ventilation as tolerated per physician orders.

## 2019-11-19 NOTE — WOUND TEAM
[facility]  Inpatient Services  Initial Wound and Skin Care Evaluation    Admission Date: 11/15/2019     Consult Date: 11/16/2019 @ 1650   HPI, PMH, SH: Reviewed    Unit where seen by Wound Team: [id number]     WOUND CONSULT RELATED TO:  Right FA and buttocks     SUBJECTIVE:  Pt intubated     Self Report / Pain Level:  No SS of pain or discomfort      OBJECTIVE:  Pt lying in bed, intubated, Barrier paste and viscopaste to buttocks, mepitel one to skin tears on right arm.    WOUND TYPE, LOCATION, CHARACTERISTICS (Pressure Injuries: location, stage, POA or date identified)    Wound 11/15/19 Skin Tear Arm (Active)   Wound Image         Site Assessment Red    Anh-wound Assessment Clean;Dry;Intact;Edema    Drainage Amount Scant    Drainage Description Serous    Treatments Other (Comment)    Cleansing Not Applicable    Dressing Options Nonadherent Contact Layer    Dressing Cleansing/Solutions Not Applicable    Dressing Changed Other (Comment)    Dressing Status Clean;Dry;Intact    Dressing Change Frequency As Needed    WOUND NURSE ONLY - Odor None    WOUND NURSE ONLY - Exposed Structures None    WOUND NURSE ONLY - Tissue Type and Percentage 100% Red    WOUND NURSE ONLY - Time Spent with Patient (mins) 60        Wound 11/15/19 Buttocks (Active)   Wound Image      Site Assessment Bleeding    Anh-wound Assessment Red    Margins Attached edges;Defined edges    Drainage Amount Moderate    Drainage Description Sanguineous    Non-staged Wound Description Partial thickness    Treatments Cleansed;Site care    Cleansing Approved Wound Cleanser    Periwound Protectant Skin Protectant wipes to Periwound    Dressing Options Open to Air    Dressing Cleansing/Solutions Not Applicable    NEXT Weekly Photo (Inpatient Only) 11/25/19    WOUND NURSE ONLY - Odor None    WOUND NURSE ONLY - Exposed Structures None    WOUND NURSE ONLY - Tissue Type and Percentage 100% Red          Vascular:    Dorsal Pedal pulses:  NA  Posterior  tib pulses:   NA    JESICA:      NA    Lab Values:    Lab Results   Component Value Date/Time    WBC 24.6 (H) 11/18/2019 04:45 AM    RBC 2.44 (L) 11/18/2019 04:45 AM    HEMOGLOBIN 7.6 (L) 11/18/2019 12:17 PM    HEMATOCRIT 22.9 (L) 11/18/2019 12:17 PM      No results found for: HBA1C     Culture:   NA    INTERVENTIONS BY WOUND TEAM:  Chart reviewed, this RN up to assess patient, discussed with bedside RN. Right arm assessed, pt has healing skin tears with mepitel one already in place. No advanced wound care needs indicated. Pt was then turned toward the left for sacrococcygeal area assessment. Sacrococcygeal area intact. Pt does have some breakdown to the lower buttocks that appears to be related to moisture. The areas appear to be micro tears that are bleeding. Attempted to apply barrier paste however wounds are extremely wet, will order stoma powder to crust the area prior to application of barrier paste. Pt returned to a supine position and pillow moved from the right side to under the left side.    Dressing Selection:  Stoma powder, barrier paste, viscopaste         Interdisciplinary consultation: Patient, Bedside RN (Paz)    EVALUATION: Pt is an older woman who was admitted with GI Bleed, end-stage liver disease and Hepatic encephalopathy. The patient has skin tears to right arm which have dressings in place and are healing. Pts sacrum is intact, pt does have bleeding to the buttocks most likely related to moisture and friction. Stoma powder to be applied to dry the area followed by barrier paste.     Factors affecting wound healing: Age, Obesity, Intubation, ETOH, GERD   Goals: Steady decrease in wound area and depth weekly.    NURSING PLAN OF CARE ORDERS (X):    Dressing changes: See Dressing Care orders: X  Skin care: See Skin Care orders: X  Rectal tube care: See Rectal Tube Care orders:   Other orders:    RSKIN: CURRENT (X) ORDERED (O):   Q shift Guillaume:  X  Q shift pressure point assessments:  X  Pressure  redistribution mattress            JOHN  X, ICU Bed        Bariatric JOHN         Bariatric foam           Heel float boots      Heel silicone dressing  X    Float Heels off Bed with Pillows               Barrier wipes         Barrier Cream         Barrier paste X         Sacral silicone dressing NA    Silicone O2 tubing         Anchorfast  X       Cannula fixation Device (Tender )          Gray Foam Ear protectors           Trach with Optifoam split foam                 Waffle cushion        Waffle Overlay  X       Rectal tube or BMS   Purwick/Condom Cath         Antifungal tx      Interdry          Reposition q 2 hours X       Up to chair        Ambulate      PT/OT        Dietician        Diabetes Education      PO     TF     TPN     NPO   # days   Other        WOUND TEAM PLAN OF CARE (X):   NPWT change 3 x week:       Dressing changes by wound team:       Follow up as needed:  X     Other (explain):     Anticipated discharge plans (X):   SNF:           Home Care:           Outpatient Wound Center:            Self Care:            Other:  X, TBD pt critically ill, pt currently has no outpatient advanced wound care needs

## 2019-11-19 NOTE — CARE PLAN
Adult Ventilation Update    Total Vent Days: 3    Patient Lines/Drains/Airways Status      Active Airway       Name: Placement date: Placement time: Site: Days:    Airway ETT 7.5  11/17/19   0545   --  1                    In the last 24 hours, the patient tolerated SBT for 1 hour on settings of 10/8.    #FVC / Vital Capacity (liters) : (Unable to follow) (11/18/19 1540)  NIF (cm H2O) : (Unable to follow) (11/18/19 1540)  Rapid Shallow Breathing Index (RR/VT): 10 (11/18/19 1540)  Plateau Pressure (Q Shift): 19 (11/19/19 0303)  Static Compliance (ml / cm H2O): 31.1 (11/19/19 0303)    Patient failed trials because of Barriers to Wean: Evidence of active shock,hemmorhage or sepsis(per MD, alveolar hemmorhage) (11/17/19 1527)  Barriers to SBT Weaning Trial Stopped due to:: Pt weaned for 1 hour and returned to rest settings per protocol (11/18/19 1540)  Length of Weaning Trial Length of Weaning Trial (Hours): 1 (11/18/19 1540)    Sputum/Suction   Cough: Non Productive (11/19/19 0303)  Sputum Amount: Small (11/19/19 0000)  Sputum Color: Tan;Pink Tinged (11/19/19 0000)  Sputum Consistency: Thin (11/19/19 0000)    Mobility  Level of Mobility: Level II (11/19/19 0200)  Activity Performed: Edge of bed (11/19/19 0200)  Time Activity Tolerated: 5 min (11/19/19 0200)  Distance Per Occurrence (ft.): 0 feet (11/19/19 0200)  # of Times Distance was Traveled: 0 (11/19/19 0200)  Assistance: Assistance of Two or More (11/19/19 0200)  Ambulation Tolerance: General Weakness (11/19/19 0200)  Pt Calls for Assistance: No (11/19/19 0200)  Staff Present for Mobilization: RN (11/19/19 0200)  Gait: Unable to Ambulate (11/19/19 0200)  Assistive Devices: Hand held assist (11/19/19 0200)  Reason Not Mobilized: Unstable condition (11/17/19 1200)  Mobilization Comments: (needed full assist of 2 for lateral support while sitting) (11/18/19 0300)    Events/Summary/Plan: Vent check (11/18/19 4268)

## 2019-11-19 NOTE — PROGRESS NOTES
Critical Care Progress Note    Date of admission  11/15/2019    Chief Complaint  59 y.o. female admitted 11/15/2019 with respiratory failure    Hospital Course    59 y.o. female who presented 11/15/2019 with h/o alcoholic cirrhosis who presented to OSH with decompensated cirrhosis.  Treated with zosyn and flagyl x7days, was hypotensive in shock.  Found to have a portal vein thrombus and was treated with a heparin gtt.  Also treated for EtOH withdrawal. Developed GIB so was transferred to [facility] for high level of care.  Admitted yesterday and treated with octreotide and PPI.  Overnight pt's Hgb dropped and she was given a unit of blood.  She developed respiratory distress, which per RN reports started before the blood transfusion was initiated.  When I evaluated the patient she was in significant respiratory distress with tachypnea, increase WOB, diffuse ronchi and wheezes.  Requiring facemask oxygen. She was also rigoring and confused.  Brought to ICU where I intubated her for respiratory distress and because she'll need an EGD today and would not be safe for EGD without airway protection.  I also stopped the blood transfusion and had it sent back to the lab for evaluation of transfusion reaction given her rigors.  Prior to intubation patient gave limited history but denied abdominal pain or chest pain, just reported dyspnea.  She was oriented x2 (location and self).       Interval Problem Update  Reviewed last 24 hour events:  T-max 98.6  - 1.1 L over last 24 hours, + 400 cc  since admit  CXR with unchanged diffuse bilateral hazy opacity  WBC 17- >24->21    K 3.4  VBG 7.39/41/25/41    BC X 11/17 NGTD  BAL 11/17 NGTD  MRSA swab 11/17 NEG  Ascites 11/16 NGTD  BC X 11/16 NGTD    Unasyn 11/17-P  Ceftriaxone 11/15-16    Precedex@0.2  Norepinephrine@8    Last BM 11/17    Review of Systems  Review of Systems   Unable to perform ROS: Intubated        Vital Signs for last 24 hours   Temp:  [36.5 °C (97.7 °F)-36.9 °C  (98.4 °F)] 36.8 °C (98.2 °F)  Pulse:  [73-97] 74  Resp:  [14-26] 17  BP: ()/(46-85) 90/46  SpO2:  [92 %-100 %] 100 %    Hemodynamic parameters for last 24 hours       Respiratory Information for the last 24 hours  Oh Vent Mode: APVCMV  Rate (breaths/min): 20  Vt Target (mL): 360  PEEP/CPAP: 8  FiO2: 40  P Support: 10  P MEAN: 12  Length of Weaning Trial (Hours): 1  Control VTE (exp VT): 359    Physical Exam   Physical Exam  Vitals signs and nursing note reviewed.   Constitutional:       Appearance: She is ill-appearing. She is not diaphoretic.   HENT:      Head: Normocephalic and atraumatic.   Eyes:      General: Scleral icterus present.      Pupils: Pupils are equal, round, and reactive to light.   Neck:      Musculoskeletal: Neck supple.   Cardiovascular:      Rate and Rhythm: Normal rate.      Pulses: Normal pulses.   Pulmonary:      Breath sounds: No wheezing or rales.   Abdominal:      General: There is distension.      Tenderness: There is no tenderness. There is no guarding.   Musculoskeletal:      Right lower leg: Edema present.      Left lower leg: Edema present.   Skin:     General: Skin is warm and dry.      Coloration: Skin is jaundiced.   Neurological:      Cranial Nerves: No cranial nerve deficit.   Psychiatric:      Comments: Sedate         Medications  Current Facility-Administered Medications   Medication Dose Route Frequency Provider Last Rate Last Dose   • potassium chloride in water (KCL) ivpb **Administer in ICU only** 20 mEq  20 mEq Intravenous Once Scott Novak M.D. 100 mL/hr at 11/19/19 0701 20 mEq at 11/19/19 0701   • dexmedetomidine (PRECEDEX) 400 mcg/100mL NS premix infusion  0.1-1.5 mcg/kg/hr Intravenous Continuous Scott Novak M.D. 4.9 mL/hr at 11/19/19 0605 0.2 mcg/kg/hr at 11/19/19 0605   • ipratropium-albuterol (DUONEB) nebulizer solution  3 mL Nebulization Q4HRS (RT) Howie Barraza M.D.   3 mL at 11/19/19 0636   • furosemide (LASIX) injection 40 mg  40 mg  Intravenous BID DIURETIC Amol Titus M.D.   40 mg at 11/19/19 0508   • Respiratory Care per Protocol   Nebulization Continuous RT Ambreen Diaz M.D.       • ipratropium-albuterol (DUONEB) nebulizer solution  3 mL Nebulization Q2HRS PRN (RT) Ambreen Diaz M.D.       • MD Alert...ICU Electrolyte Replacement per Pharmacy   Other PHARMACY TO DOSE Ambreen Diaz M.D.       • lidocaine (XYLOCAINE) 1 % injection 1-2 mL  1-2 mL Tracheal Tube Q30 MIN PRN Ambreen Diaz M.D.   2 mL at 11/17/19 0603   • pantoprazole (PROTONIX) injection 40 mg  40 mg Intravenous BID Ambreen Diaz M.D.   40 mg at 11/19/19 0512   • ondansetron (ZOFRAN ODT) dispertab 4 mg  4 mg Enteral Tube Q4HRS PRN Ambreen Diaz M.D.       • levothyroxine (SYNTHROID) tablet 50 mcg  50 mcg Enteral Tube AM ES Ambreen Diaz M.D.   Stopped at 11/17/19 0600   • zinc sulfate (ZINCATE) capsule 220 mg  220 mg Enteral Tube Q12HRS Ambreen Diaz M.D.   Stopped at 11/17/19 0600   • norepinephrine (LEVOPHED) 8 mg in  mL Infusion  0-30 mcg/min Intravenous Continuous Marlon Zavala M.D. 15 mL/hr at 11/18/19 2100 8 mcg/min at 11/18/19 2100   • fentaNYL (SUBLIMAZE) injection  mcg   mcg Intravenous Q HOUR PRN Marlon Zavala M.D.       • ampicillin/sulbactam (UNASYN) 3 g in  mL IVPB  3 g Intravenous Q6HRS Marlon Zavala M.D.   Stopped at 11/19/19 0537   • K+ Scale: Goal of 4.5  1 Each Intravenous Q6HRS Marlon Zavala M.D.   1 Each at 11/19/19 0621   • ondansetron (ZOFRAN) syringe/vial injection 4 mg  4 mg Intravenous Q4HRS PRN Amol Titus M.D.       • influenza vaccine quad injection 0.5 mL  0.5 mL Intramuscular Once PRN Ezio Louie M.D.           Fluids    Intake/Output Summary (Last 24 hours) at 11/19/2019 0754  Last data filed at 11/19/2019 0600  Gross per 24 hour   Intake 1433.68 ml   Output 2545 ml   Net -1111.32 ml       Laboratory  Recent Labs     11/17/19  0428 11/17/19  1235 11/18/19  0418 11/19/19  0424   ISTATAPH 7.413  7.380* 7.417  --    ISTATAPCO2 32.6 33.5 34.3  --    ISTATAPO2 65 106* 64  --    ISTATATCO2 22 21 23  --    [id number] 93 98 93  --    ISTATARTHCO3 20.8 19.8 22.1  --    ISTATARTBE -4 -5* -2  --    ISTATTEMP 98.7 F 36.4 C 36.2 C 36.6 C   ISTATFIO2 50 60 40 40   ISTATSPEC Arterial Arterial Arterial Venous   ISTATAPHTC 7.412 7.389* 7.429  --    VGUDQBLR0FU 65 102* 61*  --          Recent Labs     11/17/19 0146 11/18/19 0445 11/18/19  1828 11/19/19  0000 11/19/19  0520   SODIUM 146*  --  146*  --   --   --  152*   POTASSIUM 2.3*   < > 2.9*   < > 3.2* 3.2* 3.4*   CHLORIDE 111  --  113*  --   --   --  117*   CO2 22  --  23  --   --   --  25   BUN 38*  --  36*  --   --   --  38*   CREATININE 1.28  --  1.30  --   --   --  1.27   MAGNESIUM 2.1  --  2.2  --   --   --  2.2   PHOSPHORUS 4.0  --  3.5  --   --   --  2.7   CALCIUM 9.9  --  9.7  --   --   --  9.8    < > = values in this interval not displayed.     Recent Labs     11/17/19 0146 11/18/19 0445 11/19/19  0520   ALTSGPT 28 32  --    ASTSGOT 64* 61*  --    ALKPHOSPHAT 99 93  --    TBILIRUBIN 7.7* 8.2*  --    GLUCOSE 139* 172* 114*     Recent Labs     11/17/19 0146 11/18/19 0445 11/19/19  0520   WBC 17.2* 24.6* 21.9*   NEUTSPOLYS 80.90* 85.70* 83.10*   LYMPHOCYTES 10.40* 4.50* 6.80*   MONOCYTES 8.70 7.20 8.50   EOSINOPHILS 0.00 0.00 0.00   BASOPHILS 0.00 0.20 0.00   ASTSGOT 64* 61*  --    ALTSGPT 28 32  --    ALKPHOSPHAT 99 93  --    TBILIRUBIN 7.7* 8.2*  --      Recent Labs     11/17/19  0146  11/17/19  0833  11/18/19  0445 11/18/19  1217 11/19/19  0000 11/19/19  0520   RBC 1.87*  --   --   --  2.44*  --   --  2.49*   HEMOGLOBIN 6.2*   < >  --    < > 8.0* 7.6* 8.3* 8.2*   HEMATOCRIT 19.5*   < >  --    < > 24.7* 22.9* 25.7* 26.3*   PLATELETCT 137*  --   --   --  129*  --   --  133*   PROTHROMBTM 21.1*  --  20.8*  --   --   --   --   --    APTT 40.4*  --  36.9*  --   --   --   --   --    INR 1.77*  --  1.73*  --   --   --   --   --     < > = values in this  interval not displayed.       Imaging  X-Ray:  I have personally reviewed the images and compared with prior images.    Assessment/Plan  * GI bleed- (present on admission)  Assessment & Plan  Conservative transfusion strategy  Continue Protonix  Continue antibiotics for SBP prophylaxis  Status post EGD 11/17 with grade 1 esophageal varices    ARDS (adult respiratory distress syndrome) (HCC)  Assessment & Plan  Ddx pneumonia, TRALI, aspiration  Continue full mechanical ventilatory support  Follow-up BAL  Continue antibiotics  Lung protective strategies    Acute respiratory failure with hypoxia (HCC)- (present on admission)  Assessment & Plan  Intubated 11/17  Continue full mechanical ventilatory support  I am adjusting ventilator based on ABG pulmonary mechanics  RT/O2 protocols  Continue aspiration coverage  Follow-up BAL  Attempt diuresis when clinically appropriate    HERBERTH (acute kidney injury) (HCC)- (present on admission)  Assessment & Plan  Trending towards improvement  Renally dose medications minimize nephrotoxic substances  Monitor urine output    Portal vein thrombosis- (present on admission)  Assessment & Plan  Anti-coagulation contraindicated, and indeed, not typically indicated in patients with cirrhosis unless there is evidence of gut ischemia    Alcoholic hepatitis with ascites- (present on admission)  Assessment & Plan  Discriminant function 44.8  Not currently on prednisone secondary to concern of GI bleed  Serial ammonia levels  Minimize hepatotoxic substances  Recommend complete EtOH cessation    Decompensated hepatic cirrhosis (HCC)  Assessment & Plan  Reason for decompensation may be portal vein thrombus vs EtOH use.  Was treated for an infection at OSH.  No SBP on paracentesis.      Hypokalemia- (present on admission)  Assessment & Plan  Replace to keep greater than 4    Anemia associated with acute blood loss- (present on admission)  Assessment & Plan  Transfuse <7    Hepatic encephalopathy  (HCC)- (present on admission)  Assessment & Plan  Lactulose, rifaximin    Hypothyroidism- (present on admission)  Assessment & Plan  levothyroxine       VTE:  Contraindicated  Ulcer: PPI  Lines: Central Line  Ongoing indication addressed    I have performed a physical exam and reviewed and updated ROS and Plan today (11/19/2019). In review of yesterday's note (11/18/2019), there are no changes except as documented above.     Discussed patient condition and risk of morbidity and/or mortality with Hospitalist, RN, RT and Pharmacy  The patient remains critically ill.  Critical care time = 33 minutes in directly providing and coordinating critical care and extensive data review.  No time overlap and excludes procedures.

## 2019-11-19 NOTE — FLOWSHEET NOTE
11/18/19 1540   Events/Summary/Plan   Events/Summary/Plan placed pt back to rest settings   Weaning Parameters   RR (bpm) 15   #FVC / Vital Capacity (liters)    (Unable to follow)   NIF (cm H2O)    (Unable to follow)   Rapid Shallow Breathing Index (RR/VT) 10   Spontaneous VE 5.7   Spontaneous    Weaning Trial   Weaning Trial Stopped due to: Pt weaned for 1 hour and returned to rest settings per protocol   Length of Weaning Trial (Hours) 1

## 2019-11-19 NOTE — CARE PLAN
Problem: Venous Thromboembolism (VTW)/Deep Vein Thrombosis (DVT) Prevention:  Goal: Patient will participate in Venous Thrombosis (VTE)/Deep Vein Thrombosis (DVT)Prevention Measures  Outcome: PROGRESSING AS EXPECTED     Problem: Fluid Volume:  Goal: Will maintain balanced intake and output  Outcome: PROGRESSING SLOWER THAN EXPECTED  Note:   Fluids are 3rd spaced, diuresing well     Problem: Skin Integrity  Goal: Risk for impaired skin integrity will decrease  Outcome: PROGRESSING SLOWER THAN EXPECTED  Note:   Skin kept clean and dry, IAD care per order

## 2019-11-19 NOTE — DIETARY
"Nutrition Support Assessment   Day 4 of admit.  Emily Torres is a 59 y.o. female with admitting DX of GI bleed, ESLD, portal vein thrombosis, hepatic encephalopathy.      Current problem list:  1. Acute respiratory failure with hypoxia  2. ARDS  3. Rigors  4. GI bleed  5. HERBERTH  6. Alcoholic hepatitis with ascites  7. Portal vein thrombosis  8. Decompensated hepatic cirrhosis  9. Hypokalemia  10. Leukocytosis  11. Hepatic encephalopathy  12. Anemia associated with acute blood loss  13. Hypothyroidism     Assessment:  Estimated Nutritional Needs: based on:   Height: 167.6 cm (5' 6\")  Weight: 97.2 kg (214 lb 4.6 oz) - via bed scale 11/19. ? Dry wt- pt continues to be diuresed. Will use 205 lb (92.99 kg, BMI 33.1 kg/m²) as suspect wt closer to dry wt.   Body mass index is 34.59 kg/m² based on 11/19 bed scale wt, BMI classification: Obesity Class I    Calculation/Equation: MSJ x1= 1533 kcal; PSU= 1607 kcal (65-70%: 5587-9235 kcal) (VE: 4.7, Tmax 36.8)  Total Calories/Day: 0324-9733 (12-14kcal/kg ABW)  Total Grams Protein/Day:  112-130 (1.2-1.4 g/kg ABW)(1.9-2.2 g/kg IBW)     Will hypo-calorically feed as pt requires ventilator support and BMI >30.      Evaluation:   1. Intubation 2/2 to alveolar hemorrhage serves as indication for nutrition support.   2. Enteral access pending Cortrak placement today.   3. PMH: ETOH abuse, ESLD, hypothyroidism, GERD  4. Labs: Na 152, K 3.4, glu 114, GFR 43, BUN 38  5. MAR: precedex, lasix, diuril, K+ scale, lactulose, ICU electrolyte replacement, unasyn, xifaxan,  levophed @ 15 mL/hr, protonix, zinc sulfate  6. GI: LBM 11/18. Bowel pattern normoactive x4 per RN 11/18.  7. Skin: No stageable pressure wounds documented. Skin tear to right arm, partial thickness wound to buttocks noted.  8. Pt noted with 3rd spaced fluid, generalized fluid overload. 2+ general edema, +2-+3 pitting/weeping edema to BUE/BLE. Moderate pulmonary edema, ascites noted per MD.   9. High protein formula indicated d/t " pt elevated protein needs 2/2 to ARF/HERBERTH/ESLD, critical status.       Malnutrition Risk: Unable to assess for malnutrition at this time. However, pt at elevated risk 2/2 to ESLD.      Recommendations/Plan:  1. Start Peptamen Intense @ 25 mL/hr and advance per protocol to goal of 50 mL/hr provide 1200 kcal, 110.4 grams protein, and 1008 mL free water.  2. Fluids per MD.  3. Will order met cart.   4. Monitor weights.     RD following.

## 2019-11-20 PROBLEM — I95.9 HYPOTENSION: Status: ACTIVE | Noted: 2019-01-01

## 2019-11-20 PROBLEM — E87.0 HYPERNATREMIA: Status: ACTIVE | Noted: 2019-01-01

## 2019-11-20 NOTE — DIETARY
Nutrition Services: TF adjustment per results of metabolic cart study     Admit day 5.  Current TF: Peptamen Intense VHP @ goal rate 50 ml/hr, providing 1200 kcal, 110 grams protein, and 1008 ml free water per day.  Metabolic cart study completed by RT 11/19. REE = 2103 kcal/day (strong study). 65-70% of REE (obesity guidelines) = 9967-8078 kcal/day.  Will increase caloric provisions slightly per results of metabolic cart study.  Current TF formula remains appropriate.     Recommend:  Increase TF Peptamen Intense VHP to new goal rate 60 ml/hr to provide 1440 kcal, 132 grams protein (2.2 grams protein/kg IBW), and 1210 ml free water per day.     RD following. Will repeat metabolic cart study as needed.

## 2019-11-20 NOTE — PROGRESS NOTES
Spiritual Care Note    Patient Information     Patient's Name: Emily Torres   MRN: [id number]    YOB: 1960   Age and Gender: 59 y.o. female   Service Area: [facility]   Room (and Bed): [place] [address]   Ethnicity or Nationality:     Primary Language: English   Anglican/Spiritual preference: None   Place of Residence: [place], [place]   Family/Friends/Others Present: No    Clinical Team Present: Nurse   Medical Diagnosis(-es)/Procedure(s): GI bleed   Code Status: Full Code    Date of Admission: 11/15/2019   Length of Stay: 5 days        Spiritual Care Provider Information:  Name of Spiritual Care Provider: Sima Sun  Title of Spiritual Care Provider: Associate   Phone Number: [phone]  E-mail: [email or web address]  Total time : 45 minutes    Spiritual Screen Results:    Gen Nursing  Spiritual Screen  Is your spiritual health or inner well-being important to you as you cope with your medical condition?: No  Would you like to receive a visit from our Spiritual Care team or your own Methodist or spiritual leader?: No  Was spiritual care education provided to the patient?: Declined     Palliative Care  PC Anglican/Spiritual Screening  Is your spiritual health or inner well-being important to you as you cope with your medical condition?: Yes  Would you like to receive a visit from our Spiritual Care team or your own Methodist or spiritual leader?: Yes  Was spiritual care education provided to the patient?: Declined  PC Sprituality screening comment: order placed      Encounter/Request Information  Encounter/Request Type     Visited With: Patient    Nature of the Visit: Initial, On shift    Continue Visiting: (UPON REQUEST)    Crisis Visit: Critical care    Referral From/ Origin of Request: [place] rounds    Religous Needs/Values       Spiritual Assessment   Spiritual Care Encounters    Observations/Symptoms: (Pt intubated, awake, )    Interacton/Conversation:  was leaving  floor when noticed pt having hunching shoulders while intubated. No visitors at BS.  entered room and asked pt if she was ok. Pt nodded head up and down.  Pt had a few tears running down side of face.  wiped tears.  offered encouragement for pt.  checked in nurse after pt seemed to want to rest. Pt closed eyes, relaxed shoulders.  Nurse shared that parents of pt couldn't visit today due to weather (they drive in from [place]).  confirmed she will check in with pt again.    Assessment: Distress  Distress: Overwhelmed(Pt's eyes very tired looking, sad)    Interventions: Compassionate Presence, CARE channel Emotional Support    Outcomes: (Difficult to assess)    Plan: (WILL CONTINUE TO FOLLOW WHEN ROUNDING UNIT)    Notes:

## 2019-11-20 NOTE — ASSESSMENT & PLAN NOTE
Off pressors  F/u cultures  Complete course of abx  Continue midodrine  Multifactorial with liver dz, ? Infection, active diureses, poss umana  Improving

## 2019-11-20 NOTE — PROGRESS NOTES
Two RN skin assessment completed. Patient has skin tears on left arm in the wrist and upper arm area, skin tears on right arm in the area of forearm and upper arm with nonadherent dressing in place. Patient has generalized skin discoloration and bruising. Patient is also edematous. Small red non blanching spot smaller than a dime found on the bottom of the left butt cheek. Patient has pink blanching heels, pink blanching sacrum and the perineum is excoriated. SCDs, ET, cortrak, and restraints are in place. Patient has mepilex placed on heels, no mepilex placed on sacrum due to incontinence of loose watery stools. Pillows used to float heels, waffle cushion overlay in place and patient is q two hours for repositioning. Pictures taken.

## 2019-11-20 NOTE — PROGRESS NOTES
Critical Care Progress Note    Date of admission  11/15/2019    Chief Complaint  59 y.o. female admitted 11/15/2019 with respiratory failure    Hospital Course    59 y.o. female who presented 11/15/2019 with h/o alcoholic cirrhosis who presented to OSH with decompensated cirrhosis.  Treated with zosyn and flagyl x7days, was hypotensive in shock.  Found to have a portal vein thrombus and was treated with a heparin gtt.  Also treated for EtOH withdrawal. Developed GIB so was transferred to [facility] for high level of care.  Admitted yesterday and treated with octreotide and PPI.  Overnight pt's Hgb dropped and she was given a unit of blood.  She developed respiratory distress, which per RN reports started before the blood transfusion was initiated.  When I evaluated the patient she was in significant respiratory distress with tachypnea, increase WOB, diffuse ronchi and wheezes.  Requiring facemask oxygen. She was also rigoring and confused.  Brought to ICU where I intubated her for respiratory distress and because she'll need an EGD today and would not be safe for EGD without airway protection.  I also stopped the blood transfusion and had it sent back to the lab for evaluation of transfusion reaction given her rigors.  Prior to intubation patient gave limited history but denied abdominal pain or chest pain, just reported dyspnea.  She was oriented x2 (location and self).       Interval Problem Update  Reviewed last 24 hour events:  T-max 98.6  -2.8 L over last 24 hours, -2.4 L  since admit  CXR with slight improvement of diffuse bilateral hazy opacity  WBC 17- >24->21->14    K 3.4  ABG 7.7 3/19/93/99 on 30    BC X 11/17 NGTD  BAL 11/17 NGTD  MRSA swab 11/17 NEG  Ascites 11/16 NGTD  BC X 11/16 NGTD    Unasyn 11/17-P  Ceftriaxone 11/15-16    Precedex@0.2  Norepinephrine@7    Last BM 11/17    Review of Systems  Review of Systems   Unable to perform ROS: Intubated        Vital Signs for last 24 hours   Temp:  [36 °C  (96.8 °F)-36.9 °C (98.4 °F)] 36.7 °C (98.1 °F)  Pulse:  [60-87] 80  Resp:  [12-31] 19  BP: ()/(43-61) 92/49  SpO2:  [93 %-100 %] 97 %    Hemodynamic parameters for last 24 hours       Respiratory Information for the last 24 hours  Oh Vent Mode: APVCMV  Rate (breaths/min): 16  Vt Target (mL): 360  PEEP/CPAP: 8  FiO2: 30  P Support: 10  P MEAN: 9.4  Length of Weaning Trial (Hours): 45 minutes  Control VTE (exp VT): 390    Physical Exam   Physical Exam  Vitals signs and nursing note reviewed.   Constitutional:       Appearance: She is ill-appearing. She is not diaphoretic.   HENT:      Head: Normocephalic and atraumatic.   Eyes:      General: Scleral icterus present.      Pupils: Pupils are equal, round, and reactive to light.   Neck:      Musculoskeletal: Neck supple.   Cardiovascular:      Rate and Rhythm: Normal rate.      Pulses: Normal pulses.   Pulmonary:      Breath sounds: No wheezing or rales.   Abdominal:      General: There is distension.      Tenderness: There is no tenderness. There is no guarding.   Musculoskeletal:      Right lower leg: Edema present.      Left lower leg: Edema present.   Skin:     General: Skin is warm and dry.      Coloration: Skin is jaundiced.   Neurological:      Cranial Nerves: No cranial nerve deficit.   Psychiatric:      Comments: Sedate         Medications  Current Facility-Administered Medications   Medication Dose Route Frequency Provider Last Rate Last Dose   • potassium chloride in water (KCL) ivpb **Administer in ICU only** 20 mEq  20 mEq Intravenous Once Aiden Florence Jr., D.O. 50 mL/hr at 11/20/19 0644 20 mEq at 11/20/19 0644   • lactulose 20 GM/30ML solution 30 mL  30 mL Enteral Tube TID Scott Novak M.D.   30 mL at 11/20/19 0442   • riFAXIMin (XIFAXAN) tablet 550 mg  550 mg Enteral Tube BID Scott Novak M.D.   550 mg at 11/20/19 0443   • Pharmacy Consult: Enteral tube insertion - review meds/change route/product selection  1 Each Other PHARMACY TO  DOSE Scott Novak M.D.       • dexmedetomidine (PRECEDEX) 400 mcg/100mL NS premix infusion  0.1-1.5 mcg/kg/hr Intravenous Continuous Scott Novak M.D. 4.9 mL/hr at 11/20/19 0729 0.2 mcg/kg/hr at 11/20/19 0729   • ipratropium-albuterol (DUONEB) nebulizer solution  3 mL Nebulization Q4HRS (RT) Howie Barraza M.D.   3 mL at 11/20/19 0646   • furosemide (LASIX) injection 40 mg  40 mg Intravenous BID DIURETIC Amol Titus M.D.   40 mg at 11/20/19 0443   • Respiratory Care per Protocol   Nebulization Continuous RT Ambreen Diaz M.D.       • ipratropium-albuterol (DUONEB) nebulizer solution  3 mL Nebulization Q2HRS PRN (RT) Ambreen Diaz M.D.       • MD Alert...ICU Electrolyte Replacement per Pharmacy   Other PHARMACY TO DOSE Ambreen Diaz M.D.       • lidocaine (XYLOCAINE) 1 % injection 1-2 mL  1-2 mL Tracheal Tube Q30 MIN PRN Ambreen Diaz M.D.   2 mL at 11/17/19 0603   • pantoprazole (PROTONIX) injection 40 mg  40 mg Intravenous BID Ambreen Diaz M.D.   40 mg at 11/20/19 0444   • ondansetron (ZOFRAN ODT) dispertab 4 mg  4 mg Enteral Tube Q4HRS PRN Ambreen Diaz M.D.       • levothyroxine (SYNTHROID) tablet 50 mcg  50 mcg Enteral Tube AM ES Ambreen Diaz M.D.   50 mcg at 11/20/19 0442   • zinc sulfate (ZINCATE) capsule 220 mg  220 mg Enteral Tube Q12HRS Ambreen Diaz M.D.   220 mg at 11/20/19 0442   • norepinephrine (LEVOPHED) 8 mg in  mL Infusion  0-30 mcg/min Intravenous Continuous Marlon Zavala M.D. 13.1 mL/hr at 11/20/19 0729 7 mcg/min at 11/20/19 0729   • fentaNYL (SUBLIMAZE) injection  mcg   mcg Intravenous Q HOUR PRN Marlon Zavala M.D.   100 mcg at 11/20/19 0202   • ampicillin/sulbactam (UNASYN) 3 g in  mL IVPB  3 g Intravenous Q6HRS Marlon Zavala M.D.   Stopped at 11/20/19 0512   • K+ Scale: Goal of 4.5  1 Each Intravenous Q6HRS Marlon Zavala M.D.   1 Each at 11/20/19 0600   • ondansetron (ZOFRAN) syringe/vial injection 4 mg  4 mg Intravenous Q4HRS PRN Amol  MAURY Titus       • influenza vaccine quad injection 0.5 mL  0.5 mL Intramuscular Once PRN Ezio Louie M.D.           Fluids    Intake/Output Summary (Last 24 hours) at 11/20/2019 0807  Last data filed at 11/20/2019 0700  Gross per 24 hour   Intake 1505.99 ml   Output 5420 ml   Net -3914.01 ml       Laboratory  Recent Labs     11/17/19  1235 11/18/19  0418 11/19/19  0424 11/20/19  0438   ISTATAPH 7.380* 7.417  --  7.732*   ISTATAPCO2 33.5 34.3  --  19.8*   ISTATAPO2 106* 64  --  93*   ISTATATCO2 21 23  --  27   [id number] 98 93  --  99   ISTATARTHCO3 19.8 22.1  --  26.3*   ISTATARTBE -5* -2  --  4*   ISTATTEMP 36.4 C 36.2 C 36.6 C 36.6 C   ISTATFIO2 60 40 40 30   ISTATSPEC Arterial Arterial Venous Arterial   ISTATAPHTC 7.389* 7.429  --  7.739*   [id number] 102* 61*  --  91*         Recent Labs     11/18/19 0445 11/19/19  0520  11/19/19  1719 11/19/19  2335 11/20/19  0500   SODIUM 146*  --  152*  --   --   --  153*   POTASSIUM 2.9*   < > 3.4*   < > 3.2* 3.4* 3.4*   CHLORIDE 113*  --  117*  --   --   --  116*   CO2 23  --  25  --   --   --  27   BUN 36*  --  38*  --   --   --  35*   CREATININE 1.30  --  1.27  --   --   --  1.05   MAGNESIUM 2.2  --  2.2  --   --   --  2.0   PHOSPHORUS 3.5  --  2.7  --   --   --  2.7   CALCIUM 9.7  --  9.8  --   --   --  9.8    < > = values in this interval not displayed.     Recent Labs     11/18/19 0445 11/19/19  0520 11/19/19  2335 11/20/19  0500   ALTSGPT 32  --   --   --    ASTSGOT 61*  --   --   --    ALKPHOSPHAT 93  --   --   --    TBILIRUBIN 8.2*  --   --   --    PREALBUMIN  --   --  10.0*  --    GLUCOSE 172* 114*  --  128*     Recent Labs     11/18/19  0445 11/19/19  0520 11/20/19  0500   WBC 24.6* 21.9* 14.9*   NEUTSPOLYS 85.70* 83.10* 78.20*   LYMPHOCYTES 4.50* 6.80* 11.40*   MONOCYTES 7.20 8.50 7.80   EOSINOPHILS 0.00 0.00 1.00   BASOPHILS 0.20 0.00 0.10   ASTSGOT 61*  --   --    ALTSGPT 32  --   --    ALKPHOSPHAT 93  --   --    TBILIRUBIN 8.2*  --   --       Recent Labs     11/17/19  0833  11/18/19  0445  11/19/19  0520 11/19/19  1209 11/20/19  0500   RBC  --   --  2.44*  --  2.49*  --  2.51*   HEMOGLOBIN  --    < > 8.0*   < > 8.2* 8.7* 8.4*   HEMATOCRIT  --    < > 24.7*   < > 26.3* 26.5* 27.2*   PLATELETCT  --   --  129*  --  133*  --  98*   PROTHROMBTM 20.8*  --   --   --   --   --   --    APTT 36.9*  --   --   --   --   --   --    INR 1.73*  --   --   --   --   --   --     < > = values in this interval not displayed.       Imaging  X-Ray:  I have personally reviewed the images and compared with prior images.    Assessment/Plan  * GI bleed- (present on admission)  Assessment & Plan  Conservative transfusion strategy  PPI  Continue antibiotics for SBP prophylaxis  Status post EGD 11/17 with grade 1 esophageal varices    Acute respiratory failure with hypoxia (HCC)- (present on admission)  Assessment & Plan  Intubated 11/17  Continue full mechanical ventilatory support  I am adjusting ventilator based on ABG pulmonary mechanics  RT/O2 protocols  Continue aspiration coverage  Forced diuresis    HERBERTH (acute kidney injury) (HCC)- (present on admission)  Assessment & Plan  Trending towards improvement  Renally dose medications minimize nephrotoxic substances  Monitor urine output    Portal vein thrombosis- (present on admission)  Assessment & Plan  Anti-coagulation contraindicated, and indeed, not typically indicated in patients with cirrhosis unless there is evidence of gut ischemia    Alcoholic hepatitis with ascites- (present on admission)  Assessment & Plan  Discriminant function 44.8  Not currently on prednisone secondary to concern of GI bleed  Serial ammonia levels  Minimize hepatotoxic substances  Recommend complete EtOH cessation    Hypotension  Assessment & Plan  Continue levophed with active titration  F/u cultures  Complete course of abx  Start midodrine   Check cortisol level  Multifactorial with liver dz, ? Infection, active diureses, poss  umana    Hypernatremia  Assessment & Plan  Add free water  Trend    Decompensated hepatic cirrhosis (HCC)  Assessment & Plan  Reason for decompensation may be portal vein thrombus vs EtOH use.  Was treated for an infection at OSH.  No SBP on paracentesis.      Hypokalemia- (present on admission)  Assessment & Plan  Replace to keep greater than 4    Anemia associated with acute blood loss- (present on admission)  Assessment & Plan  Transfuse <7    Hepatic encephalopathy (HCC)- (present on admission)  Assessment & Plan  Lactulose, rifaximin    Hypothyroidism- (present on admission)  Assessment & Plan  levothyroxine       VTE:  Contraindicated  Ulcer: PPI  Lines: Central Line  Ongoing indication addressed    I have performed a physical exam and reviewed and updated ROS and Plan today (11/20/2019). In review of yesterday's note (11/19/2019), there are no changes except as documented above.     Discussed patient condition and risk of morbidity and/or mortality with Hospitalist, RN, RT and Pharmacy  The patient remains critically ill.  Critical care time = 35 minutes in directly providing and coordinating critical care and extensive data review.  No time overlap and excludes procedures.

## 2019-11-20 NOTE — CARE PLAN
Adult Ventilation Update    Total Vent Days: 4    Patient Lines/Drains/Airways Status      Active Airway       Name: Placement date: Placement time: Site: Days:    Airway ETT 7.5  11/17/19 0545   --  3                    In the last 24 hours, the patient tolerated SBT for 1 on settings of 5/8.    #FVC / Vital Capacity (liters) : (Unable to follow) (11/18/19 1540)  NIF (cm H2O) : (Unable to follow) (11/18/19 1540)  Rapid Shallow Breathing Index (RR/VT): 10 (11/18/19 1540)  Plateau Pressure (Q Shift): 20 (11/19/19 2253)  Static Compliance (ml / cm H2O): 33.9 (11/20/19 0339)    Patient failed trials because of Barriers to Wean: Evidence of active shock,hemmorhage or sepsis(per MD, alveolar hemmorhage) (11/17/19 1527)  Barriers to SBT Weaning Trial Stopped due to:: RSBI >105 (Rapid Shallow Breathing Index) (11/19/19 0925)  Length of Weaning Trial Length of Weaning Trial (Hours): 45 minutes (11/19/19 0925)    Sputum/Suction   Cough: Non Productive (11/20/19 0339)  Sputum Amount: Small (11/19/19 2253)  Sputum Color: Clear (11/19/19 2253)  Sputum Consistency: Thin (11/19/19 2253)    Mobility  Level of Mobility: Level III (11/19/19 1200)  Activity Performed: Edge of bed (11/20/19 0200)  Time Activity Tolerated: 7 min (11/20/19 0200)  Distance Per Occurrence (ft.): 0 feet (11/20/19 0200)  # of Times Distance was Traveled: 0 (11/20/19 0200)  Assistance: Assistance of Two or More (11/19/19 1200)  Ambulation Tolerance: Does Not Tolerate (11/20/19 0200)  Pt Calls for Assistance: No (11/19/19 1200)  Staff Present for Mobilization: RN (11/20/19 0200)  Gait: Unable to Ambulate (11/20/19 0200)  Assistive Devices: Hand held assist (11/19/19 1800)  Reason Not Mobilized: Unstable condition (11/17/19 1200)  Mobilization Comments: (needed full assist of 2 for lateral support while sitting) (11/18/19 0300)    Events/Summary/Plan: SBT stopped due to ABG results. RR to 16 per MD Florence  (11/20/19 6968)

## 2019-11-20 NOTE — CARE PLAN
Problem: Infection  Goal: Will remain free from infection  Note:   Central line care, hernandez care, and vap care per protocol.     Problem: Venous Thromboembolism (VTW)/Deep Vein Thrombosis (DVT) Prevention:  Goal: Patient will participate in Venous Thrombosis (VTE)/Deep Vein Thrombosis (DVT)Prevention Measures  Note:   Scds in place and turned on.     Problem: Skin Integrity  Goal: Risk for impaired skin integrity will decrease  Note:   2 RN skin check with edmar n  Devices in place: cvcx1 et tube et jimbo cortrak puls ox bp cuff, scds hernandez, restraints  Skin under devices intact with exception of skin tear under left restraint present prior to shift with mepitel over. Sacrum heels elbows and ears samia. Excoriation on buttocks with viscopaste and barrier paste improved from yesterday. Punctures on abd and left groin unchanged and DEANN, bruisung, swelling and scabbing throughout   michael completed, skin assessed. q 2hr turns, pillows supporting elbows and heels, barrier wipe and barrier paster used accordingly, head repositioned frequently to prevent break down. Waffle cushion in place. Mattress set to appropriate wt.

## 2019-11-21 NOTE — CARE PLAN
Adult Ventilation Update    Total Vent Days: 4    APVCMV  13 360 +8 30%    Pt SBT for 1 hour. Not following for parameters at this time

## 2019-11-21 NOTE — CARE PLAN
Adult Ventilation Update    Total Vent Days: 5    Patient Lines/Drains/Airways Status      Active Airway       Name: Placement date: Placement time: Site: Days:    Airway ETT 7.5  11/17/19   0545   --  3                    In the last 24 hours, the patient tolerated SBT for 2 on settings of 5/8.    #FVC / Vital Capacity (liters) : (unable to follow) (11/20/19 1245)  NIF (cm H2O) : (unable to follow) (11/20/19 1245)  Rapid Shallow Breathing Index (RR/VT): 71 (11/20/19 1245)  Plateau Pressure (Q Shift): 17 (11/21/19 0207)  Static Compliance (ml / cm H2O): 27.3 (11/21/19 0207)    Barriers to SBT Weaning Trial Stopped due to:: Pt weaned for 1 hour and returned to rest settings per protocol (11/20/19 1245)  Length of Weaning Trial Length of Weaning Trial (Hours): 1 (11/20/19 1245)    Sputum/Suction   Cough: Non Productive (11/21/19 0207)  Sputum Amount: Scant (11/21/19 0207)  Sputum Color: Clear (11/21/19 0207)  Sputum Consistency: Thin (11/21/19 0207)      Events/Summary/Plan: pt placed back on APVCMV; parameters attempted (11/20/19 1245)

## 2019-11-21 NOTE — PROGRESS NOTES
0600 Patient self extubated during sheet change. RN initiated oxymask and called RT. VSS.  0605 patient VSS, aox1, will not stop speaking despite education.   0610 MD Novak paged.   0630 MD Novak paged.  [phone] MD novak returned page. Per md melissa valencia to charoav patient on dex gtt.

## 2019-11-21 NOTE — PROGRESS NOTES
Critical Care Progress Note    Date of admission  11/15/2019    Chief Complaint  59 y.o. female admitted 11/15/2019 with respiratory failure    Hospital Course    59 y.o. female who presented 11/15/2019 with h/o alcoholic cirrhosis who presented to OSH with decompensated cirrhosis.  Treated with zosyn and flagyl x7days, was hypotensive in shock.  Found to have a portal vein thrombus and was treated with a heparin gtt.  Also treated for EtOH withdrawal. Developed GIB so was transferred to [facility] for high level of care.  Admitted yesterday and treated with octreotide and PPI.  Overnight pt's Hgb dropped and she was given a unit of blood.  She developed respiratory distress, which per RN reports started before the blood transfusion was initiated.  When I evaluated the patient she was in significant respiratory distress with tachypnea, increase WOB, diffuse ronchi and wheezes.  Requiring facemask oxygen. She was also rigoring and confused.  Brought to ICU where I intubated her for respiratory distress and because she'll need an EGD today and would not be safe for EGD without airway protection.  I also stopped the blood transfusion and had it sent back to the lab for evaluation of transfusion reaction given her rigors.  Prior to intubation patient gave limited history but denied abdominal pain or chest pain, just reported dyspnea.  She was oriented x2 (location and self).       Interval Problem Update  Reviewed last 24 hour events:  T-max 99.5  -4.3 L over last 24 hours, -6.8 L  since admit  CXR improving pulmonary edema  WBC 17- >24->21->14->13    K 3.9  ABG 7.4 8/40/69/95 on 30  Self extubated this a.m.    BC X 11/17 NGTD  BAL 11/17 NGTD  MRSA swab 11/17 NEG  Ascites 11/16 NGTD  BC X 11/16 NGTD    Unasyn 11/17-P  Ceftriaxone 11/15-16    Precedex@0.2  Norepinephrine@4    96% on 2 L  Last BM 11/20    Review of Systems  Review of Systems   Unable to perform ROS: Acuity of condition        Vital Signs for last 24  hours   Temp:  [36.7 °C (98.1 °F)-37.4 °C (99.3 °F)] 36.9 °C (98.4 °F)  Pulse:  [] 97  Resp:  [13-27] 20  BP: ()/(33-62) 92/51  SpO2:  [93 %-100 %] 96 %    Hemodynamic parameters for last 24 hours       Respiratory Information for the last 24 hours  Oh Vent Mode: Vent Standby  Rate (breaths/min): 16  Vt Target (mL): 360  PEEP/CPAP: 8  FiO2: 30  P Support: 5  P MEAN: 10  Length of Weaning Trial (Hours): 1  Control VTE (exp VT): 410    Physical Exam   Physical Exam  Vitals signs and nursing note reviewed.   Constitutional:       Appearance: She is ill-appearing. She is not diaphoretic.   HENT:      Head: Normocephalic and atraumatic.   Eyes:      General: Scleral icterus present.      Pupils: Pupils are equal, round, and reactive to light.   Neck:      Musculoskeletal: Neck supple.   Cardiovascular:      Rate and Rhythm: Normal rate.      Pulses: Normal pulses.   Pulmonary:      Breath sounds: No wheezing or rales.   Abdominal:      General: There is distension.      Tenderness: There is no tenderness. There is no guarding.   Musculoskeletal:      Right lower leg: Edema present.      Left lower leg: Edema present.   Skin:     General: Skin is warm and dry.      Coloration: Skin is jaundiced.   Neurological:      Cranial Nerves: No cranial nerve deficit.   Psychiatric:      Comments: Encephalopathic         Medications  Current Facility-Administered Medications   Medication Dose Route Frequency Provider Last Rate Last Dose   • NOREPINEPHRINE BITARTRATE 1 MG/ML IV SOLN            • SODIUM CHLORIDE 0.9 % IV SOLN            • potassium chloride (KCL) ivpb 10 mEq  10 mEq Intravenous Once Scott Novak M.D. 100 mL/hr at 11/21/19 0728 10 mEq at 11/21/19 0728   • potassium bicarbonate (KLYTE) effervescent tablet 50 mEq  50 mEq Enteral Tube TID Scott Novak M.D.   50 mEq at 11/21/19 0518   • midodrine (PROAMATINE) tablet 10 mg  10 mg Enteral Tube TID WITH MEALS Scott Novak M.D.   10 mg at 11/21/19  0530   • omeprazole (FIRST-OMEPRAZOLE) 2 mg/mL oral susp 40 mg  40 mg Enteral Tube Q12HRS Scott Novak M.D.   40 mg at 11/21/19 0518   • lactulose 20 GM/30ML solution 30 mL  30 mL Enteral Tube TID Scott Novak M.D.   30 mL at 11/21/19 0519   • riFAXIMin (XIFAXAN) tablet 550 mg  550 mg Enteral Tube BID Scott Novak M.D.   550 mg at 11/21/19 0521   • Pharmacy Consult: Enteral tube insertion - review meds/change route/product selection  1 Each Other PHARMACY TO DOSE Scott Novak M.D.       • dexmedetomidine (PRECEDEX) 400 mcg/100mL NS premix infusion  0.1-1.5 mcg/kg/hr Intravenous Continuous Scott Novak M.D. 4.9 mL/hr at 11/21/19 0519 0.2 mcg/kg/hr at 11/21/19 0519   • furosemide (LASIX) injection 40 mg  40 mg Intravenous BID DIURETIC Amol Titus M.D.   40 mg at 11/21/19 0518   • Respiratory Care per Protocol   Nebulization Continuous RT Ambreen Diaz M.D.       • ipratropium-albuterol (DUONEB) nebulizer solution  3 mL Nebulization Q2HRS PRN (RT) Ambreen Diaz M.D.       • MD Alert...ICU Electrolyte Replacement per Pharmacy   Other PHARMACY TO DOSE Ambreen Diaz M.D.       • lidocaine (XYLOCAINE) 1 % injection 1-2 mL  1-2 mL Tracheal Tube Q30 MIN PRN Ambreen Diaz M.D.   2 mL at 11/17/19 0603   • ondansetron (ZOFRAN ODT) dispertab 4 mg  4 mg Enteral Tube Q4HRS PRN Ambreen Diaz M.D.       • levothyroxine (SYNTHROID) tablet 50 mcg  50 mcg Enteral Tube AM ES Ambreen Diaz M.D.   50 mcg at 11/21/19 0518   • zinc sulfate (ZINCATE) capsule 220 mg  220 mg Enteral Tube Q12HRS Ambreen Diaz M.D.   220 mg at 11/21/19 0519   • norepinephrine (LEVOPHED) 8 mg in  mL Infusion  0-30 mcg/min Intravenous Continuous Marlon Zavala M.D. 7.5 mL/hr at 11/21/19 0300 4 mcg/min at 11/21/19 0300   • fentaNYL (SUBLIMAZE) injection  mcg   mcg Intravenous Q HOUR PRN Marlon Zavala M.D.   100 mcg at 11/21/19 0241   • ampicillin/sulbactam (UNASYN) 3 g in  mL IVPB  3 g Intravenous Q6HRS  Marlon Zavala M.D.   Stopped at 11/21/19 0548   • K+ Scale: Goal of 4.5  1 Each Intravenous Q6HRS Marlon Zavala M.D.   1 Each at 11/21/19 0600   • ondansetron (ZOFRAN) syringe/vial injection 4 mg  4 mg Intravenous Q4HRS PRN Amol Titus M.D.       • influenza vaccine quad injection 0.5 mL  0.5 mL Intramuscular Once PRN Ezio Louie M.D.           Fluids    Intake/Output Summary (Last 24 hours) at 11/21/2019 0756  Last data filed at 11/21/2019 0600  Gross per 24 hour   Intake 2744.42 ml   Output 5300 ml   Net -2555.58 ml       Laboratory  Recent Labs     11/19/19  0424 11/20/19  0438 11/21/19  0409   ISTATAPH  --  7.732* 7.487   ISTATAPCO2  --  19.8* 40.8*   ISTATAPO2  --  93* 69   ISTATATCO2  --  27 32   [id number]  --  99 95   ISTATARTHCO3  --  26.3* 30.9*   ISTATARTBE  --  4* 7*   ISTATTEMP 36.6 C 36.6 C 37.0 C   ISTATFIO2 40 30 30   ISTATSPEC Venous Arterial Arterial   ISTATAPHTC  --  7.739* 7.487   [id number]  --  91* 69         Recent Labs     11/19/19  0520  11/20/19  0500  11/20/19  1802 11/20/19  2335 11/21/19  0534   SODIUM 152*  --  153*  --   --   --  155*   POTASSIUM 3.4*   < > 3.4*   < > 3.8 3.7 3.9   CHLORIDE 117*  --  116*  --   --   --  116*   CO2 25  --  27  --   --   --  29   BUN 38*  --  35*  --   --   --  32*   CREATININE 1.27  --  1.05  --   --   --  0.86   MAGNESIUM 2.2  --  2.0  --   --   --   --    PHOSPHORUS 2.7  --  2.7  --   --   --   --    CALCIUM 9.8  --  9.8  --   --   --  9.1    < > = values in this interval not displayed.     Recent Labs     11/19/19  0520 11/19/19  2335 11/20/19  0500 11/21/19  0534   PREALBUMIN  --  10.0*  --   --    GLUCOSE 114*  --  128* 128*     Recent Labs     11/19/19  0520 11/20/19  0500 11/21/19  0534   WBC 21.9* 14.9* 13.4*   NEUTSPOLYS 83.10* 78.20* 77.10*   LYMPHOCYTES 6.80* 11.40* 11.30*   MONOCYTES 8.50 7.80 9.50   EOSINOPHILS 0.00 1.00 1.00   BASOPHILS 0.00 0.10 0.10     Recent Labs     11/19/19  0520 11/19/19  1209 11/20/19  0500  11/21/19  0534   RBC 2.49*  --  2.51* 2.49*   HEMOGLOBIN 8.2* 8.7* 8.4* 8.4*   HEMATOCRIT 26.3* 26.5* 27.2* 27.1*   PLATELETCT 133*  --  98* 92*       Imaging  X-Ray:  I have personally reviewed the images and compared with prior images.    Assessment/Plan  * GI bleed- (present on admission)  Assessment & Plan  Conservative transfusion strategy  PPI  Continue antibiotics for SBP prophylaxis  Status post EGD 11/17 with grade 1 esophageal varices    Acute respiratory failure with hypoxia (HCC)- (present on admission)  Assessment & Plan  Intubated 11/17-21  RT/O2 protocols  DC Lasix given hypernatremia, give 2 doses of Diuril  Continue Precedex with active titration for agitation   Aspiration precaution  Complete course of Unasyn    HERBERTH (acute kidney injury) (HCC)- (present on admission)  Assessment & Plan  Trending towards improvement  Renally dose medications minimize nephrotoxic substances  Monitor urine output    Portal vein thrombosis- (present on admission)  Assessment & Plan  Anti-coagulation contraindicated, and indeed, not typically indicated in patients with cirrhosis unless there is evidence of gut ischemia    Alcoholic hepatitis with ascites- (present on admission)  Assessment & Plan  Discriminant function 44.8  Not currently on prednisone secondary to concern of GI bleed  Serial ammonia levels  Minimize hepatotoxic substances  Recommend complete EtOH cessation    Hypotension  Assessment & Plan  Continue Levophed with active titration  F/u cultures  Complete course of abx  Continue midodrine  Multifactorial with liver dz, ? Infection, active diureses, poss umana    Hypernatremia  Assessment & Plan  Continue free water  Diarrheal  Trend    Decompensated hepatic cirrhosis (HCC)  Assessment & Plan  Reason for decompensation may be portal vein thrombus vs EtOH use.  Was treated for an infection at OSH.  No SBP on paracentesis.      Hypokalemia- (present on admission)  Assessment & Plan  Replace to keep greater  than 4    Anemia associated with acute blood loss- (present on admission)  Assessment & Plan  Transfuse <7    Hepatic encephalopathy (HCC)- (present on admission)  Assessment & Plan  Lactulose, rifaximin    Hypothyroidism- (present on admission)  Assessment & Plan  levothyroxine       VTE:  Contraindicated  Ulcer: PPI  Lines: Central Line  Ongoing indication addressed    I have performed a physical exam and reviewed and updated ROS and Plan today (11/21/2019). In review of yesterday's note (11/20/2019), there are no changes except as documented above.     Discussed patient condition and risk of morbidity and/or mortality with Hospitalist, RN, RT and Pharmacy  The patient remains critically ill.  Critical care time = 33 minutes in directly providing and coordinating critical care and extensive data review.  No time overlap and excludes procedures.

## 2019-11-21 NOTE — DISCHARGE PLANNING
Care Transition Team Assessment  In the case of an emergency, pt's NOK is Phan Gupta (Father) [phone].     This RNCM spoke with the patient's father, Phan, at bedside and obtained the information used in this assessment. Phan verified accuracy of facesheet. Pt lives in a one story house with parents. Pt uses the [facility] pharmacy in [place], [place]. Prior to current hospitalization, pt was completely independent with ADLS/IADLS. [facility] Pt is [facility]. Pt has history of alcohol abuse. Phan denies history of MH history. Pt's support system consists of her parents. Pt does not have an advanced directive at this time. Pt's discharge plan is to be determined, Pt may need SNF. Per Dr. Novak it is too early to order PT/OT as patient extubated this morning.      Upon D/C, Pt's fatherPhan will provide transport home if applicable.     Resources for substance abuse and family support provided to Pt's father, Phan.    Information Source  Orientation : Disoriented to Time  Information Given By: Parent  Informant's Name: Phan   Who is responsible for making decisions for patient? : Patient    Readmission Evaluation  Is this a readmission?: No    Elopement Risk  Legal Hold: No  Ambulatory or Self Mobile in Wheelchair: No-Not an Elopement Risk  Elopement Risk: Not at Risk for Elopement    Interdisciplinary Discharge Planning  Primary Care Physician: Irma Pineda  Lives with - Patient's Self Care Capacity: Parents  Patient or legal guardian wants to designate a caregiver (see row info): No  Caregiver name: Arlin Gupta  Caregiver relationship to patient: Mother  Caregiver contact info: [phone]  Support Systems: Parent  Housing / Facility: 1 Story House  Durable Medical Equipment: Not Applicable    Discharge Preparedness  What is your plan after discharge?: Uncertain - pending medical team collaboration, Home with help  What are your discharge supports?: Parent  Prior  Functional Level: Ambulatory, Independent with Activities of Daily Living, Independent with Medication Management  Difficulity with ADLs: None  Difficulity with IADLs: None    Functional Assesment  Prior Functional Level: Ambulatory, Independent with Activities of Daily Living, Independent with Medication Management    Finances  Prescription Coverage: Yes    Vision / Hearing Impairment  Vision Impairment : No  Hearing Impairment : No      Advance Directive  Advance Directive?: Clinically incapacitated / Inappropriate    Domestic Abuse  Have you ever been the victim of abuse or violence?: No  Physical Abuse or Sexual Abuse: No  Verbal Abuse or Emotional Abuse: No  Possible Abuse Reported to:: Not Applicable    Psychological Assessment  History of Substance Abuse: Alcohol  History of Psychiatric Problems: No  Non-compliant with Treatment: No  Newly Diagnosed Illness: Yes    Discharge Risks or Barriers  Discharge risks or barriers?: Substance abuse    Anticipated Discharge Information  Anticipated discharge disposition: Discharge needs currently unknown, Home  Discharge Address: [address] [place] [address] [place] [address]  Discharge Contact Phone Number: [phone]

## 2019-11-21 NOTE — RESPIRATORY CARE
Extubation      Stridor present: No     Events/Summary/Plan: called to bedside; pt self-extubated. Pt placed on 2L oxy mask and suctioned; no distress ntoed at this time; no stridor noted. Will continue to monitor. MD aware. (11/21/19 5322)

## 2019-11-21 NOTE — PROGRESS NOTES
Two RN skin assessment completed.  Devices in place: et tube et jimbo, restraints, electrodes, bp cuff, puls ox, cortrak, hernandez, bms, scds. Skin under devices intact with exception of skin tear under restraint, stable from prior to shift. Minimal bruising under bp cuff. Unable to change arms r/t skin tear on RUE   -skin tears on left arm in the wrist and upper arm area  -skin tears on right arm in the area of forearm and upper arm  -Small red non blanching spot smaller than a dime found on the bottom of the left buttock  -Patient has pink blanching heels, pink blanching sacrum, perineum is excoriated  -punctures on RLQ and left fem unchanged from prior shift.  Interventions: mepitel over skin tears, mepilex placed on heels, no mepilex placed on sacrum due to incontinence of loose watery stools. Pillows used to float heels and elbows, waffle cushion q two hours for repositioning, q2hr et tube repositioning, barrier paste for rectal area. BMS assesed . Pictures taken.

## 2019-11-22 NOTE — PROGRESS NOTES
Bedside report received from Murali CLAYTON. Assumed care. POC discussed. Pt resting in bed. Yelling out. Needing reorientation. Safety precautions in place. Restraints in place.

## 2019-11-22 NOTE — PROGRESS NOTES
"Assessment completed. Pt AAO to self and place only. Confused, calling out for her dad and her dog \"Marleen.\" Pt requiring frequent reorienting. Due meds given. BMS and hernandez draining to gravity. Tube feedings running per order. Pt repositioned in bed, skin protective measures in place. On 2 L O2 oxymask, attempted to wean without success. Pt family called, are on their way to the hospital.   "

## 2019-11-22 NOTE — PROGRESS NOTES
Spoke with MD, order for midline received. Called PICC RN and left message. Gr removed per order, purewick placed. No changes in pt status throughout the day, family at bedside, POC discussed including transfer to medical floor, awaiting bed.

## 2019-11-22 NOTE — CARE PLAN
Problem: Safety  Goal: Will remain free from injury  11/21/2019 1838 by Berta Rogers R.N.  Outcome: PROGRESSING AS EXPECTED  Note:   Two to more people for mobilization. Hourly rounds. Threaded socks on.   11/21/2019 1837 by Berta Rogers R.N.  Outcome: PROGRESSING AS EXPECTED     Problem: Skin Integrity  Goal: Risk for impaired skin integrity will decrease  11/21/2019 1838 by Berta Rogers R.N.  Outcome: PROGRESSING AS EXPECTED  Note:   Q2h repositioning. Waffle mattress on. Heel float boots on. Pillows for support. Skin assessment completed.   11/21/2019 1837 by Berta Rogers R.N.  Outcome: PROGRESSING AS EXPECTED

## 2019-11-22 NOTE — CARE PLAN
Problem: Communication  Goal: The ability to communicate needs accurately and effectively will improve  Outcome: PROGRESSING AS EXPECTED     Problem: Safety  Goal: Will remain free from injury  Outcome: PROGRESSING AS EXPECTED  Goal: Will remain free from falls  Outcome: PROGRESSING AS EXPECTED     Problem: Infection  Goal: Will remain free from infection  Outcome: PROGRESSING AS EXPECTED  Note:   Patient is on ATB therapy, HR is WNL, blood pressure stable no needing levo       Problem: Venous Thromboembolism (VTW)/Deep Vein Thrombosis (DVT) Prevention:  Goal: Patient will participate in Venous Thrombosis (VTE)/Deep Vein Thrombosis (DVT)Prevention Measures  Outcome: PROGRESSING AS EXPECTED     Problem: Bowel/Gastric:  Goal: Normal bowel function is maintained or improved  Outcome: PROGRESSING AS EXPECTED  Goal: Will not experience complications related to bowel motility  Outcome: PROGRESSING AS EXPECTED     Problem: Knowledge Deficit  Goal: Knowledge of disease process/condition, treatment plan, diagnostic tests, and medications will improve  Outcome: PROGRESSING AS EXPECTED  Goal: Knowledge of the prescribed therapeutic regimen will improve  Outcome: PROGRESSING AS EXPECTED     Problem: Discharge Barriers/Planning  Goal: Patient's continuum of care needs will be met  Outcome: PROGRESSING AS EXPECTED     Problem: Fluid Volume:  Goal: Will maintain balanced intake and output  Outcome: PROGRESSING AS EXPECTED     Problem: Respiratory:  Goal: Respiratory status will improve  Outcome: PROGRESSING AS EXPECTED  Note:   Pt lung sounds diminished in bases, no crackles noted, on 2 litters oxymask     Problem: Urinary Elimination:  Goal: Ability to reestablish a normal urinary elimination pattern will improve  Outcome: PROGRESSING AS EXPECTED  Flowsheets (Taken 11/22/2019 0226)  Urinary Elimination: Oliguria; Anuric/ Dialysis Patient  Note:   Patient states is normal for her to pee 2 to 3 times a day.      Problem: Skin  Integrity  Goal: Risk for impaired skin integrity will decrease  Outcome: PROGRESSING AS EXPECTED     Problem: Pain Management  Goal: Pain level will decrease to patient's comfort goal  Outcome: PROGRESSING AS EXPECTED     Problem: Psychosocial Needs:  Goal: Level of anxiety will decrease  Outcome: PROGRESSING AS EXPECTED

## 2019-11-22 NOTE — PROGRESS NOTES
Hospital Medicine Daily Progress Note    Date of Service  11/22/2019    Chief Complaint  59 y.o. female admitted 11/15/2019 with GI bleed.    Hospital Course    Ms. Torres has a past medical history of hypothyroidism and hypertension as well as alcohol use that was transferred from an outside hospital with decompensated cirrhosis.  She had been found to have portal vein thrombosis treated with a heparin drip developed a GI bleed was transferred here on octreotide.  She required intubation for ARDS on 11/17/2019 and underwent EGD revealing esophagitis and gastric erosions.      Interval Problem Update  11/22/2019: Patient seen and evaluated in the intensive care unit. She had one liter urine over night. She is on 2 liters of nasal cannula oxygen. She finished 5 days of unasyn yesterday. She is tolerating cortrak feeding. She is sleepy and a non-historian.     Consultants/Specialty  Critical care, I discussed her condition with Dr. Novak on ICU hot rounds  Gastroenterology    Code Status  DNR/DNI    Disposition  medical    Review of Systems  Review of Systems   Unable to perform ROS: Mental acuity        Physical Exam  Temp:  [37 °C (98.6 °F)] 37 °C (98.6 °F)  Pulse:  [] 104  Resp:  [14-34] 22  BP: ()/(44-68) 123/63  SpO2:  [93 %-96 %] 96 %    Physical Exam  Vitals signs and nursing note reviewed.   Constitutional:       Appearance: She is ill-appearing.   HENT:      Head: Normocephalic and atraumatic.      Nose:      Comments: cortrak nares     Mouth/Throat:      Mouth: Mucous membranes are dry.      Pharynx: Oropharynx is clear.   Eyes:      General: Scleral icterus present.      Conjunctiva/sclera: Conjunctivae normal.   Neck:      Comments: Right IJ central line  Cardiovascular:      Rate and Rhythm: Normal rate and regular rhythm.      Heart sounds: No murmur.   Pulmonary:      Effort: Pulmonary effort is normal. No respiratory distress.      Breath sounds: Normal breath sounds.   Abdominal:       General: There is distension.      Tenderness: There is no tenderness.   Musculoskeletal:         General: Swelling present.   Skin:     Coloration: Skin is jaundiced.      Comments: Bruises upper chest and spider angiomata   Neurological:      Comments: Somnolent  She is a non-historian  +encephalopathic         Fluids    Intake/Output Summary (Last 24 hours) at 11/22/2019 0716  Last data filed at 11/22/2019 0600  Gross per 24 hour   Intake 2081.44 ml   Output 4460 ml   Net -2378.56 ml       Laboratory  Recent Labs     11/20/19  0500 11/21/19  0534 11/22/19  0500   WBC 14.9* 13.4* 16.2*   RBC 2.51* 2.49* 2.46*   HEMOGLOBIN 8.4* 8.4* 8.2*   HEMATOCRIT 27.2* 27.1* 27.3*   .4* 108.8* 111.0*   MCH 33.5* 33.7* 33.3*   MCHC 30.9* 31.0* 30.0*   RDW 78.3* 89.2* 106.3*   PLATELETCT 98* 92* 110*   MPV 10.2 10.7 11.3     Recent Labs     11/20/19  0500  11/21/19  0534 11/21/19  2340 11/22/19  0500   SODIUM 153*  --  155*  --  156*   POTASSIUM 3.4*   < > 3.9 3.5* 3.4*   CHLORIDE 116*  --  116*  --  116*   CO2 27  --  29  --  34*   GLUCOSE 128*  --  128*  --  119*   BUN 35*  --  32*  --  28*   CREATININE 1.05  --  0.86  --  0.69   CALCIUM 9.8  --  9.1  --  9.0    < > = values in this interval not displayed.                   Imaging  DX-CHEST-PORTABLE (1 VIEW)   Final Result         1.  Pulmonary edema and/or infiltrates are identified, which appear somewhat increased since the prior exam.      DX-CHEST-PORTABLE (1 VIEW)   Final Result         1.  Pulmonary edema and/or infiltrates are identified, which are stable since the prior exam.      DX-CHEST-PORTABLE (1 VIEW)   Final Result         1.  Pulmonary edema and/or infiltrates are identified, which are somewhat decreased since the prior exam.         DX-ABDOMEN FOR TUBE PLACEMENT   Final Result      Cortrak feeding tube tip projects in the region of the mid stomach.      DX-CHEST-PORTABLE (1 VIEW)   Final Result         1.  Pulmonary edema and/or infiltrates are  identified, which are stable since the prior exam.      DX-CHEST-PORTABLE (1 VIEW)   Final Result         1.  Pulmonary edema and/or infiltrates are identified, which are stable since the prior exam.      DX-CHEST-PORTABLE (1 VIEW)   Final Result      1.  Endotracheal tube appears appropriately located      2.  Unchanged pulmonary edema      DX-CHEST-PORTABLE (1 VIEW)   Final Result      Development of moderate pulmonary edema      US-ABDOMEN COMPLETE SURVEY   Final Result      1.  There is ascites in the abdomen and pelvis.      2.  Sludge is noted in the gallbladder but no gallstones are identified.      3.  Small left pleural effusion is identified.      4.  Borderline splenomegaly.      5.  Mild dilatation of common bile duct. No intrahepatic ductal dilatation is appreciated.         US-PARACENTESIS, ABD WITH IMAGING   Final Result      1. Ultrasound-guided diagnostic paracentesis of the right lower quadrant of the abdominal wall.      2. 50 mL of fluid withdrawn.      DX-CHEST-PORTABLE (1 VIEW)   Final Result      1.  Left lower lobe atelectasis or pneumonia.      2.  No right lung consolidation.      3.  Right IJV central line in place.           Assessment/Plan  * GI bleed- (present on admission)  Assessment & Plan  GI bleeding the setting of his anticoagulation due to portal vein thrombosis  She has required blood transfusion  EGD was negative for ulcer or significant esophageal varices  Further anticoagulation is contraindicated      ARDS (adult respiratory distress syndrome) (HCC)- (present on admission)  Assessment & Plan  Requiring intubation and ICU admission    Acute respiratory failure with hypoxia (HCC)- (present on admission)  Assessment & Plan  In the setting of ARDS  She required intubation on 11/17/2019      HERBERTH (acute kidney injury) (HCC)- (present on admission)  Assessment & Plan  In the setting of hypotension  Creatinine has improved with blood pressure support  Follow urine output  Creatinine  level ordered for the morning    Portal vein thrombosis- (present on admission)  Assessment & Plan  Anticoagulation contraindicated with acute GI bleeding    Alcoholic hepatitis with ascites- (present on admission)  Assessment & Plan  Suspect underlying cirrhosis   MELD 11/16/19 -> 24 with a 3 month mortality of 19.6  MELD ETOH Hepatitis 11/16/19 -> 24 with a 3 month mortality of 39%    She had been charted on steroids though these have been held due to GI bleeding  She has had a paracentesis negative for spontaneous bacterial peritonitis    Hypotension- (present on admission)  Assessment & Plan  Consistent with hypovolemic shock requiring intravenous pressors    Hypernatremia  Assessment & Plan  Sodium remains high thus free water via cortrak and check BMP in the morning    Decompensated hepatic cirrhosis (HCC)- (present on admission)  Assessment & Plan  Consistent with alcoholic cirrhosis  Gastroenterology has been consulted  Bilirubin is 8    Leukocytosis- (present on admission)  Assessment & Plan  Likely secondary to steroids  Infectious work up - check blood cultures x 2 / UA  R/O SBP - paracentesis requested     Hypokalemia- (present on admission)  Assessment & Plan  Monitor / replace PRN     Anemia associated with acute blood loss- (present on admission)  Assessment & Plan  Secondary to acute upper GI bleeding  Requiring blood transfusions  Hemoglobin 8.2 standing orders to transfuse 1 unit packed red blood cells for hemoglobin less than 7  CBC ordered for the morning    Hepatic encephalopathy (HCC)- (present on admission)  Assessment & Plan  Continue lactulose, rifaximin and zinc    Hypothyroidism- (present on admission)  Assessment & Plan  Continue Synthroid 50 mcg, TSH was checked on presentation       VTE prophylaxis: SCDs

## 2019-11-22 NOTE — PROGRESS NOTES
Attempted US IV placement x3 without success. Charge RN Clara attempted x2 without success. Updated MD regarding central line, no PIV access and transfer orders to the floor.

## 2019-11-22 NOTE — RESPIRATORY CARE
COPD EDUCATION by COPD CLINICAL EDUCATOR  11/22/2019 at 6:57 AM by Irma Nation     Patient reviewed by COPD education team. Patient does not have a history or diagnosis of COPD and is a non-smoker, therefore does not qualify for the COPD program.

## 2019-11-22 NOTE — CARE PLAN
Problem: Communication  Goal: The ability to communicate needs accurately and effectively will improve  Outcome: PROGRESSING SLOWER THAN EXPECTED  Pt confused, AAOx2. Needs reorienting frequently. RN attempting to use direct, short questions and answers to gauge pt's understanding. POC explained, will provide information to family when they arrive at bedside as well.  Problem: Psychosocial Needs:  Goal: Level of anxiety will decrease  Outcome: PROGRESSING SLOWER THAN EXPECTED  Pt seemingly anxious, calling out for family and her dog, bouncing legs and trembling. RN sitting at bedside to verbally calm pt, TV on for distraction, pt repositioned for comfort, curtains closed to decrease outside stimuli. Will f/u with MD regarding medication for anxiety management if necessary.

## 2019-11-22 NOTE — PROGRESS NOTES
Beto from Lab called with critical result of NA of 156 at 0555. Critical lab result read back to Beto.   Dr. Galaviz notified of critical lab result at 0640.  Critical lab result read back by Dr. Galaviz.

## 2019-11-22 NOTE — PROGRESS NOTES
Critical Care Progress Note    Date of admission  11/15/2019    Chief Complaint  59 y.o. female admitted 11/15/2019 with respiratory failure    Hospital Course    59 y.o. female who presented 11/15/2019 with h/o alcoholic cirrhosis who presented to OSH with decompensated cirrhosis.  Treated with zosyn and flagyl x7days, was hypotensive in shock.  Found to have a portal vein thrombus and was treated with a heparin gtt.  Also treated for EtOH withdrawal. Developed GIB so was transferred to [facility] for high level of care.  Admitted yesterday and treated with octreotide and PPI.  Overnight pt's Hgb dropped and she was given a unit of blood.  She developed respiratory distress, which per RN reports started before the blood transfusion was initiated.  When I evaluated the patient she was in significant respiratory distress with tachypnea, increase WOB, diffuse ronchi and wheezes.  Requiring facemask oxygen. She was also rigoring and confused.  Brought to ICU where I intubated her for respiratory distress and because she'll need an EGD today and would not be safe for EGD without airway protection.  I also stopped the blood transfusion and had it sent back to the lab for evaluation of transfusion reaction given her rigors.  Prior to intubation patient gave limited history but denied abdominal pain or chest pain, just reported dyspnea.  She was oriented x2 (location and self).       Interval Problem Update  Reviewed last 24 hour events:  T-max 99.3  -2.3 L over last 24 hours, -9.1 L  since admit  CXR ongoing pulmonary edema  WBC 17- >24->21->14->13->16  ->156   K 3.4      BC X 11/17 NGTD  BAL 11/17 NGTD  MRSA swab 11/17 NEG  Ascites 11/16 NGTD  BC X 11/16 NGTD    Unasyn 11/17-22  Ceftriaxone 11/15-16    Precedex@pause  Norepinephrine@pause    96% on 2 L  Last BM 11/22    Review of Systems  Review of Systems   Unable to perform ROS: Acuity of condition    remains quite encephalopathic    Vital Signs for last 24 hours    Temp:  [37.4 °C (99.3 °F)] 37.4 °C (99.3 °F)  Pulse:  [] 110  Resp:  [14-34] 30  BP: ()/(44-68) 133/65  SpO2:  [93 %-96 %] 96 %    Hemodynamic parameters for last 24 hours       Respiratory Information for the last 24 hours       Physical Exam   Physical Exam  Vitals signs and nursing note reviewed.   Constitutional:       Appearance: She is ill-appearing. She is not diaphoretic.   HENT:      Head: Normocephalic and atraumatic.   Eyes:      General: Scleral icterus present.      Pupils: Pupils are equal, round, and reactive to light.   Neck:      Musculoskeletal: Neck supple.   Cardiovascular:      Rate and Rhythm: Normal rate.      Pulses: Normal pulses.   Pulmonary:      Breath sounds: No wheezing or rales.   Abdominal:      General: There is distension.      Tenderness: There is no tenderness. There is no guarding.   Musculoskeletal:      Right lower leg: Edema present.      Left lower leg: Edema present.   Skin:     General: Skin is warm and dry.      Coloration: Skin is jaundiced.   Neurological:      Cranial Nerves: No cranial nerve deficit.   Psychiatric:      Comments: Encephalopathic         Medications  Current Facility-Administered Medications   Medication Dose Route Frequency Provider Last Rate Last Dose   • oxyCODONE immediate-release (ROXICODONE) tablet 5 mg  5 mg Enteral Tube Q4HRS PRN Scott Novak M.D.       • fentaNYL (SUBLIMAZE) injection 25-50 mcg  25-50 mcg Intravenous Q HOUR PRN Jesus Mcfarlane D.O.       • potassium bicarbonate (KLYTE) effervescent tablet 50 mEq  50 mEq Enteral Tube TID Scott Novak M.D.   50 mEq at 11/22/19 0501   • midodrine (PROAMATINE) tablet 10 mg  10 mg Enteral Tube TID WITH MEALS Scott Novak M.D.   10 mg at 11/22/19 0736   • omeprazole (FIRST-OMEPRAZOLE) 2 mg/mL oral susp 40 mg  40 mg Enteral Tube Q12HRS Scott Novak M.D.   40 mg at 11/22/19 0501   • lactulose 20 GM/30ML solution 30 mL  30 mL Enteral Tube TID Scott Novak M.D.    30 mL at 11/22/19 0501   • riFAXIMin (XIFAXAN) tablet 550 mg  550 mg Enteral Tube BID Scott Novak M.D.   550 mg at 11/22/19 0501   • Pharmacy Consult: Enteral tube insertion - review meds/change route/product selection  1 Each Other PHARMACY TO DOSE Scott Novak M.D.       • dexmedetomidine (PRECEDEX) 400 mcg/100mL NS premix infusion  0.1-1.5 mcg/kg/hr Intravenous Continuous Scott Novak M.D.   Stopped at 11/21/19 1421   • Respiratory Care per Protocol   Nebulization Continuous RT Ambreen Diaz M.D.       • ipratropium-albuterol (DUONEB) nebulizer solution  3 mL Nebulization Q2HRS PRN (RT) Ambreen Diaz M.D.       • MD Alert...ICU Electrolyte Replacement per Pharmacy   Other PHARMACY TO DOSE Ambreen Diaz M.D.       • lidocaine (XYLOCAINE) 1 % injection 1-2 mL  1-2 mL Tracheal Tube Q30 MIN PRN Ambreen Diaz M.D.   2 mL at 11/17/19 0603   • ondansetron (ZOFRAN ODT) dispertab 4 mg  4 mg Enteral Tube Q4HRS PRN Ambreen Diaz M.D.       • levothyroxine (SYNTHROID) tablet 50 mcg  50 mcg Enteral Tube AM ES Ambreen Diaz M.D.   50 mcg at 11/22/19 0501   • zinc sulfate (ZINCATE) capsule 220 mg  220 mg Enteral Tube Q12HRS Ambreen Diaz M.D.   220 mg at 11/22/19 0501   • norepinephrine (LEVOPHED) 8 mg in  mL Infusion  0-30 mcg/min Intravenous Continuous Marlon Zavala M.D.   Stopped at 11/21/19 1243   • ondansetron (ZOFRAN) syringe/vial injection 4 mg  4 mg Intravenous Q4HRS PRN Amol Titus M.D.       • influenza vaccine quad injection 0.5 mL  0.5 mL Intramuscular Once PRN Ezio Louie M.D.           Fluids    Intake/Output Summary (Last 24 hours) at 11/22/2019 0801  Last data filed at 11/22/2019 0600  Gross per 24 hour   Intake 1898.31 ml   Output 4010 ml   Net -2111.69 ml       Laboratory  Recent Labs     11/20/19  0438 11/21/19  0409   ISTATAPH 7.732* 7.487   ISTATAPCO2 19.8* 40.8*   ISTATAPO2 93* 69   ISTATATCO2 27 32   [id number] 99 95   ISTATARTHCO3 26.3* 30.9*   ISTATARTBE 4* 7*    ISTATTEMP 36.6 C 37.0 C   ISTATFIO2 30 30   ISTATSPEC Arterial Arterial   ISTATAPHTC 7.739* 7.487   [id number] 91* 69         Recent Labs     11/20/19  0500  11/21/19  0534 11/21/19  2340 11/22/19  0500   SODIUM 153*  --  155*  --  156*   POTASSIUM 3.4*   < > 3.9 3.5* 3.4*   CHLORIDE 116*  --  116*  --  116*   CO2 27  --  29  --  34*   BUN 35*  --  32*  --  28*   CREATININE 1.05  --  0.86  --  0.69   MAGNESIUM 2.0  --   --   --   --    PHOSPHORUS 2.7  --   --   --   --    CALCIUM 9.8  --  9.1  --  9.0    < > = values in this interval not displayed.     Recent Labs     11/19/19  2335 11/20/19  0500 11/21/19  0534 11/22/19  0500   PREALBUMIN 10.0*  --   --   --    GLUCOSE  --  128* 128* 119*     Recent Labs     11/20/19  0500 11/21/19  0534 11/22/19  0500   WBC 14.9* 13.4* 16.2*   NEUTSPOLYS 78.20* 77.10* 77.60*   LYMPHOCYTES 11.40* 11.30* 10.80*   MONOCYTES 7.80 9.50 9.20   EOSINOPHILS 1.00 1.00 1.20   BASOPHILS 0.10 0.10 0.20     Recent Labs     11/20/19  0500 11/21/19  0534 11/22/19  0500   RBC 2.51* 2.49* 2.46*   HEMOGLOBIN 8.4* 8.4* 8.2*   HEMATOCRIT 27.2* 27.1* 27.3*   PLATELETCT 98* 92* 110*       Imaging  X-Ray:  I have personally reviewed the images and compared with prior images.    Assessment/Plan  * GI bleed- (present on admission)  Assessment & Plan  Conservative transfusion strategy  PPI  Completed 6 days abx for sbp prophy  Status post EGD 11/17 with grade 1 esophageal varices    Acute respiratory failure with hypoxia (HCC)- (present on admission)  Assessment & Plan  Intubated 11/17-21  RT/O2 protocols  continue Precedex with active titration for agitation   Aspiration precaution  Completed course of abx 11/22    HERBERTH (acute kidney injury) (HCC)- (present on admission)  Assessment & Plan  Trending towards improvement  Renally dose medications minimize nephrotoxic substances  Monitor urine output    Portal vein thrombosis- (present on admission)  Assessment & Plan  Anti-coagulation contraindicated, and  indeed, not typically indicated in patients with cirrhosis unless there is evidence of gut ischemia    Alcoholic hepatitis with ascites- (present on admission)  Assessment & Plan  Discriminant function 44.8  Not currently on prednisone secondary to concern of GI bleed  Serial ammonia levels  Minimize hepatotoxic substances  Recommend complete EtOH cessation  Lactulose/rifaximin     Hypotension- (present on admission)  Assessment & Plan  Off pressors  F/u cultures  Complete course of abx  Continue midodrine  Multifactorial with liver dz, ? Infection, active diureses, poss umana    Hypernatremia  Assessment & Plan  increase free water  diuril  Trend    Decompensated hepatic cirrhosis (HCC)- (present on admission)  Assessment & Plan  Reason for decompensation may be portal vein thrombus vs EtOH use.  Was treated for an infection at OSH.  No SBP on paracentesis.      Hypokalemia- (present on admission)  Assessment & Plan  Replace to keep greater than 4    Anemia associated with acute blood loss- (present on admission)  Assessment & Plan  Transfuse <7    Hepatic encephalopathy (HCC)- (present on admission)  Assessment & Plan  Lactulose, rifaximin    Hypothyroidism- (present on admission)  Assessment & Plan  levothyroxine       VTE:  Contraindicated  Ulcer: PPI  Lines: Central Line  Ongoing indication addressed    I have performed a physical exam and reviewed and updated ROS and Plan today (11/22/2019). In review of yesterday's note (11/21/2019), there are no changes except as documented above.     Discussed patient condition and risk of morbidity and/or mortality with Hospitalist, RN, RT and Pharmacy  The patient remains critically ill.  Critical care time = 37 minutes in directly providing and coordinating critical care and extensive data review.  No time overlap and excludes procedures.

## 2019-11-22 NOTE — PALLIATIVE CARE
Palliative Care follow-up  Call placed to family to touch bases and provide support; pt self extubated this AM and made DNR/DNI with family and MD. No answer and message left requesting a call back.       Plan: f/u with family to provide support    Thank you for allowing Palliative Care to participate in this patient's care. Please feel free to call [phone] with any questions or concerns.

## 2019-11-23 PROBLEM — Z66 DNR (DO NOT RESUSCITATE): Status: ACTIVE | Noted: 2019-01-01

## 2019-11-23 NOTE — PROCEDURES
Vascular Access Team    Date of Insertion: 11/23/19  Arm Circumference: n/a  Line Length: 10  Line Size: 20  Vein Occupancy %: 38  Reason for Midline: access  Labs: WBC 18.1, , BUN 27, Cr 0.69, GFR >60, INR 1.73 on 11/17/19    Orders confirmed, vessel patency confirmed with ultrasound. Risks and benefits of procedure explained to patient and education regarding line associated bloodstream infections provided. Questions answered.     PowerGlide Midline placed in LUE per licensed provider order with ultrasound guidance. 20g, 10 cm line placed in brachial vein after 1 attempt(s).  Catheter inserted with brisk blood return. Secured with 0cm external from insertion site.  Line flushed without resistance with 10 mL 0.9% normal saline.  Midline secured with Biopatch and Tegaderm.     Midline placement is confirmed by nurse using ultrasound and ability to flush and draw blood. Midline is appropriate for use at this time.  No X-ray is needed for placement confirmation. Pt tolerated procedure well.  Patient condition relayed to unit RN or ordering physician via this post procedure note in the EMR.    Ultrasound images uploaded to PACS and viewable in the EMR - yes  Ultrasound imaged printed and placed in paper chart - no      BARD PowerGlide Midline ref # [id number], Lot # [id number]

## 2019-11-23 NOTE — CARE PLAN
Problem: Respiratory:  Goal: Respiratory status will improve  Outcome: PROGRESSING AS EXPECTED  Pt on 1-2 L O2, attempting to wean off. Coarse lung sounds with productive cough, requiring suctioning. Will attempt IS use today with pt. Will also try to mobilize.   Problem: Discharge Barriers/Planning  Goal: Patient's continuum of care needs will be met  Outcome: PROGRESSING SLOWER THAN EXPECTED  Pt has orders to transfer to medical floor. No beds available at this time. Unclear long term POC, will f/u with MD, BARBARA and pt family--I.e. SNF vs LTACH vs Hospice.

## 2019-11-23 NOTE — PROGRESS NOTES
Pt up to EOB x10 min, tolerated well. No other changes in pt status. Bedside report given to Murali CLAYTON. POC discussed. Pt resting comfortably in bed. Safety precautions in place.      12 hr chart check

## 2019-11-23 NOTE — PROGRESS NOTES
Hospital Medicine Daily Progress Note    Date of Service  11/23/2019    Chief Complaint  59 y.o. female admitted 11/15/2019 with GI bleed.    Hospital Course    Ms. Torres has a past medical history of hypothyroidism and hypertension as well as alcohol use that was transferred from an outside hospital with decompensated cirrhosis.  She had been found to have portal vein thrombosis treated with a heparin drip developed a GI bleed was transferred here on octreotide.  She required intubation for ARDS on 11/17/2019 and underwent EGD revealing esophagitis and gastric erosions.      Interval Problem Update  11/22/2019: Patient seen and evaluated in the intensive care unit. She had one liter urine over night. She is on 2 liters of nasal cannula oxygen. She finished 5 days of unasyn yesterday. She is tolerating cortrak feeding. She is sleepy and a non-historian.   11/23: Ms. Torres was evaluated and examined in the ICU.  He remains very poor historian.  She is generally quite tremulous though afebrile.  Denies pain.  She continues to have cortrak feeding.   Consultants/Specialty  Critical care, I discussed her condition with Dr. Novak on ICU hot rounds  Gastroenterology    Code Status  DNR/DNI.  This is appropriate.  Her condition is very guarded prognosis overall poor.    Disposition  medical    Review of Systems  Review of Systems   Unable to perform ROS: Mental acuity        Physical Exam  Temp:  [36.2 °C (97.2 °F)-37.6 °C (99.7 °F)] 36.3 °C (97.3 °F)  Pulse:  [] 103  Resp:  [19-30] 20  BP: (101-133)/(49-77) 104/51  SpO2:  [93 %-96 %] 95 %    Physical Exam  Vitals signs and nursing note reviewed.   Constitutional:       General: She is not in acute distress.     Appearance: She is ill-appearing.   HENT:      Head: Normocephalic and atraumatic.      Nose:      Comments: cortrak nares     Mouth/Throat:      Mouth: Mucous membranes are dry.      Pharynx: Oropharynx is clear.   Eyes:      General: Scleral icterus  present.      Conjunctiva/sclera: Conjunctivae normal.   Neck:      Comments: Right IJ central line  Cardiovascular:      Rate and Rhythm: Regular rhythm. Tachycardia present.      Heart sounds: No murmur.   Pulmonary:      Effort: Pulmonary effort is normal. No respiratory distress.      Breath sounds: Normal breath sounds.   Abdominal:      General: There is distension.      Tenderness: There is no tenderness.   Musculoskeletal:         General: Swelling present. No tenderness.      Comments: Left arm midline catheter   Skin:     Coloration: Skin is jaundiced.      Comments: Bruises upper chest and spider angiomata   Neurological:      Comments: Awake, alert  She is poor historian  +encephalopathic  Tremulous    Psychiatric:      Comments: She follows commands         Fluids    Intake/Output Summary (Last 24 hours) at 11/23/2019 0657  Last data filed at 11/23/2019 0600  Gross per 24 hour   Intake 3285 ml   Output 2260 ml   Net 1025 ml       Laboratory  Recent Labs     11/21/19  0534 11/22/19  0500 11/23/19  0416   WBC 13.4* 16.2* 18.1*   RBC 2.49* 2.46* 2.37*   HEMOGLOBIN 8.4* 8.2* 8.0*   HEMATOCRIT 27.1* 27.3* 27.2*   .8* 111.0* 114.8*   MCH 33.7* 33.3* 33.8*   MCHC 31.0* 30.0* 29.4*   RDW 89.2* 106.3* 116.8*   PLATELETCT 92* 110* 132*   MPV 10.7 11.3 11.1     Recent Labs     11/22/19  0500 11/22/19  1549 11/23/19  0416   SODIUM 156* 155* 154*   POTASSIUM 3.4* 4.0 3.7   CHLORIDE 116* 115* 115*   CO2 34* 36* 34*   GLUCOSE 119* 129* 118*   BUN 28* 26* 27*   CREATININE 0.69 0.74 0.69   CALCIUM 9.0 8.9 8.8                   Imaging  DX-CHEST-PORTABLE (1 VIEW)   Final Result         1.  Pulmonary edema and/or infiltrates are identified, which appear somewhat increased since the prior exam.      DX-CHEST-PORTABLE (1 VIEW)   Final Result         1.  Pulmonary edema and/or infiltrates are identified, which are stable since the prior exam.      DX-CHEST-PORTABLE (1 VIEW)   Final Result         1.  Pulmonary edema  and/or infiltrates are identified, which are somewhat decreased since the prior exam.         DX-ABDOMEN FOR TUBE PLACEMENT   Final Result      Cortrak feeding tube tip projects in the region of the mid stomach.      DX-CHEST-PORTABLE (1 VIEW)   Final Result         1.  Pulmonary edema and/or infiltrates are identified, which are stable since the prior exam.      DX-CHEST-PORTABLE (1 VIEW)   Final Result         1.  Pulmonary edema and/or infiltrates are identified, which are stable since the prior exam.      DX-CHEST-PORTABLE (1 VIEW)   Final Result      1.  Endotracheal tube appears appropriately located      2.  Unchanged pulmonary edema      DX-CHEST-PORTABLE (1 VIEW)   Final Result      Development of moderate pulmonary edema      US-ABDOMEN COMPLETE SURVEY   Final Result      1.  There is ascites in the abdomen and pelvis.      2.  Sludge is noted in the gallbladder but no gallstones are identified.      3.  Small left pleural effusion is identified.      4.  Borderline splenomegaly.      5.  Mild dilatation of common bile duct. No intrahepatic ductal dilatation is appreciated.         US-PARACENTESIS, ABD WITH IMAGING   Final Result      1. Ultrasound-guided diagnostic paracentesis of the right lower quadrant of the abdominal wall.      2. 50 mL of fluid withdrawn.      DX-CHEST-PORTABLE (1 VIEW)   Final Result      1.  Left lower lobe atelectasis or pneumonia.      2.  No right lung consolidation.      3.  Right IJV central line in place.      IR-MIDLINE CATHETER INSERTION WO GUIDANCE > AGE 3    (Results Pending)        Assessment/Plan  * GI bleed- (present on admission)  Assessment & Plan  GI bleeding the setting of his anticoagulation due to portal vein thrombosis  She has required blood transfusion  EGD was negative for ulcer or significant esophageal varices  Further anticoagulation is contraindicated      ARDS (adult respiratory distress syndrome) (HCC)- (present on admission)  Assessment &  Plan  Requiring intubation and ICU admission    Acute respiratory failure with hypoxia (HCC)- (present on admission)  Assessment & Plan  In the setting of ARDS  She required intubation on 11/17/2019      HERBERTH (acute kidney injury) (HCC)- (present on admission)  Assessment & Plan  In the setting of hypotension  Creatinine has improved with blood pressure support  Follow urine output  Creatinine level ordered for the morning    Portal vein thrombosis- (present on admission)  Assessment & Plan  Anticoagulation contraindicated with acute GI bleeding    Alcoholic hepatitis with ascites- (present on admission)  Assessment & Plan  Suspect underlying cirrhosis   MELD 11/16/19 -> 24 with a 3 month mortality of 19.6  MELD ETOH Hepatitis 11/16/19 -> 24 with a 3 month mortality of 39%    She had been charted on steroids though these have been held due to GI bleeding  She has had a paracentesis negative for spontaneous bacterial peritonitis    DNR (do not resuscitate)  Assessment & Plan  DNR/DNI is appropriate  Overall prognosis is poor.  She may be a candidate for Hospice and comfort care if her condition does not improve    Hypotension- (present on admission)  Assessment & Plan  Consistent with hypovolemic shock requiring intravenous pressors    Hypernatremia  Assessment & Plan  Sodium remains high at 154 thus free water via cortrak and check BMP in the morning    Decompensated hepatic cirrhosis (HCC)- (present on admission)  Assessment & Plan  Consistent with alcoholic cirrhosis  Gastroenterology has been consulted  Bilirubin is 8    Hypokalemia- (present on admission)  Assessment & Plan  Monitor / replace PRN     Anemia associated with acute blood loss- (present on admission)  Assessment & Plan  Secondary to acute upper GI bleeding  Requiring blood transfusions  Hemoglobin 8.2 standing orders to transfuse 1 unit packed red blood cells for hemoglobin less than 7  CBC ordered for the morning    Hepatic encephalopathy (HCC)-  (present on admission)  Assessment & Plan  Continue lactulose, rifaximin   On 11/18 her ammonia was 51  She remains encephalopathic     Hypothyroidism- (present on admission)  Assessment & Plan  Continue Synthroid 50 mcg, TSH was checked on presentation       VTE prophylaxis: SCDs

## 2019-11-23 NOTE — PROGRESS NOTES
Bedside report received from Murali CLAYTON. Assumed care. POC discussed. Pt resting comfortably in bed. Safety precautions in place.

## 2019-11-23 NOTE — PROGRESS NOTES
Critical Care Progress Note    Date of admission  11/15/2019    Chief Complaint  59 y.o. female admitted 11/15/2019 with respiratory failure    Hospital Course    59 y.o. female who presented 11/15/2019 with h/o alcoholic cirrhosis who presented to OSH with decompensated cirrhosis.  Treated with zosyn and flagyl x7days, was hypotensive in shock.  Found to have a portal vein thrombus and was treated with a heparin gtt.  Also treated for EtOH withdrawal. Developed GIB so was transferred to [facility] for high level of care.  Admitted yesterday and treated with octreotide and PPI.  Overnight pt's Hgb dropped and she was given a unit of blood.  She developed respiratory distress, which per RN reports started before the blood transfusion was initiated.  When I evaluated the patient she was in significant respiratory distress with tachypnea, increase WOB, diffuse ronchi and wheezes.  Requiring facemask oxygen. She was also rigoring and confused.  Brought to ICU where I intubated her for respiratory distress and because she'll need an EGD today and would not be safe for EGD without airway protection.  I also stopped the blood transfusion and had it sent back to the lab for evaluation of transfusion reaction given her rigors.  Prior to intubation patient gave limited history but denied abdominal pain or chest pain, just reported dyspnea.  She was oriented x2 (location and self).       Interval Problem Update  Reviewed last 24 hour events:  T-max 99.7  +1 L over last 24 hours, -8.1 L  since admit  CXR ongoing pulmonary edema  WBC 17- >24->21->14->13->16->18  ->156->154  K 3.7  Procalcitonin 0.68    BC X 11/17 NGTD  BAL 11/17 NGTD  MRSA swab 11/17 NEG  Ascites 11/16 NGTD  BC X 11/16 NGTD    Unasyn 11/17-22  Ceftriaxone 11/15-16    95% on 2 L  Last BM 11/23    Review of Systems  Review of Systems   Unable to perform ROS: Acuity of condition    remains quite encephalopathic    Vital Signs for last 24 hours   Temp:  [36.2 °C  (97.2 °F)-37.6 °C (99.7 °F)] 36.3 °C (97.3 °F)  Pulse:  [] 103  Resp:  [19-23] 20  BP: (101-126)/(49-77) 104/51  SpO2:  [93 %-96 %] 95 %    Hemodynamic parameters for last 24 hours       Respiratory Information for the last 24 hours       Physical Exam   Physical Exam  Vitals signs and nursing note reviewed.   Constitutional:       Appearance: She is ill-appearing. She is not diaphoretic.   HENT:      Head: Normocephalic and atraumatic.   Eyes:      General: Scleral icterus present.      Pupils: Pupils are equal, round, and reactive to light.   Neck:      Musculoskeletal: Neck supple.   Cardiovascular:      Rate and Rhythm: Normal rate.      Pulses: Normal pulses.   Pulmonary:      Breath sounds: No wheezing or rales.   Abdominal:      General: There is distension.      Tenderness: There is no tenderness. There is no guarding.   Musculoskeletal:      Right lower leg: Edema present.      Left lower leg: Edema present.   Skin:     General: Skin is warm and dry.      Coloration: Skin is jaundiced.   Neurological:      Cranial Nerves: No cranial nerve deficit.   Psychiatric:      Comments: Encephalopathic         Medications  Current Facility-Administered Medications   Medication Dose Route Frequency Provider Last Rate Last Dose   • oxyCODONE immediate-release (ROXICODONE) tablet 5 mg  5 mg Enteral Tube Q4HRS PRN Scott Novak M.D.   5 mg at 11/23/19 0412   • potassium bicarbonate (KLYTE) effervescent tablet 50 mEq  50 mEq Enteral Tube TID Scott Novak M.D.   50 mEq at 11/23/19 0516   • midodrine (PROAMATINE) tablet 10 mg  10 mg Enteral Tube TID WITH MEALS Scott Novak M.D.   10 mg at 11/22/19 1753   • omeprazole (FIRST-OMEPRAZOLE) 2 mg/mL oral susp 40 mg  40 mg Enteral Tube Q12HRS Scott Novak M.D.   40 mg at 11/23/19 0516   • lactulose 20 GM/30ML solution 30 mL  30 mL Enteral Tube TID Scott Novak M.D.   30 mL at 11/23/19 0516   • riFAXIMin (XIFAXAN) tablet 550 mg  550 mg Enteral Tube BID  Scott Novak M.D.   550 mg at 11/23/19 0515   • Pharmacy Consult: Enteral tube insertion - review meds/change route/product selection  1 Each Other PHARMACY TO DOSE Scott Novak M.D.       • Respiratory Care per Protocol   Nebulization Continuous RT Ambreen Diaz M.D.       • ipratropium-albuterol (DUONEB) nebulizer solution  3 mL Nebulization Q2HRS PRN (RT) Ambreen iDaz M.D.       • MD Alert...ICU Electrolyte Replacement per Pharmacy   Other PHARMACY TO DOSE Ambreen Diaz M.D.       • lidocaine (XYLOCAINE) 1 % injection 1-2 mL  1-2 mL Tracheal Tube Q30 MIN PRN Ambreen Diaz M.D.   2 mL at 11/17/19 0603   • ondansetron (ZOFRAN ODT) dispertab 4 mg  4 mg Enteral Tube Q4HRS PRN Ambreen Diaz M.D.       • levothyroxine (SYNTHROID) tablet 50 mcg  50 mcg Enteral Tube AM ES Ambreen Diaz M.D.   50 mcg at 11/23/19 0515   • zinc sulfate (ZINCATE) capsule 220 mg  220 mg Enteral Tube Q12HRS Ambreen Diaz M.D.   220 mg at 11/23/19 0516   • ondansetron (ZOFRAN) syringe/vial injection 4 mg  4 mg Intravenous Q4HRS PRN Amol Titus M.D.       • influenza vaccine quad injection 0.5 mL  0.5 mL Intramuscular Once PRN Ezio Louie M.D.           Fluids    Intake/Output Summary (Last 24 hours) at 11/23/2019 0746  Last data filed at 11/23/2019 0600  Gross per 24 hour   Intake 3285 ml   Output 2260 ml   Net 1025 ml       Laboratory  Recent Labs     11/21/19  0409   ISTATAPH 7.487   ISTATAPCO2 40.8*   ISTATAPO2 69   ISTATATCO2 32   HZXQHSV5EAC 95   ISTATARTHCO3 30.9*   ISTATARTBE 7*   ISTATTEMP 37.0 C   ISTATFIO2 30   ISTATSPEC Arterial   ISTATAPHTC 7.487   [id number] 69         Recent Labs     11/22/19  0500 11/22/19  1549 11/23/19  0416   SODIUM 156* 155* 154*   POTASSIUM 3.4* 4.0 3.7   CHLORIDE 116* 115* 115*   CO2 34* 36* 34*   BUN 28* 26* 27*   CREATININE 0.69 0.74 0.69   CALCIUM 9.0 8.9 8.8     Recent Labs     11/22/19  0500 11/22/19  1549 11/23/19  0416   GLUCOSE 119* 129* 118*     Recent Labs      11/21/19  0534 11/22/19  0500 11/23/19  0416   WBC 13.4* 16.2* 18.1*   NEUTSPOLYS 77.10* 77.60* 76.70*   LYMPHOCYTES 11.30* 10.80* 11.30*   MONOCYTES 9.50 9.20 9.50   EOSINOPHILS 1.00 1.20 1.30   BASOPHILS 0.10 0.20 0.30     Recent Labs     11/21/19  0534 11/22/19  0500 11/23/19  0416   RBC 2.49* 2.46* 2.37*   HEMOGLOBIN 8.4* 8.2* 8.0*   HEMATOCRIT 27.1* 27.3* 27.2*   PLATELETCT 92* 110* 132*       Imaging  X-Ray:  I have personally reviewed the images and compared with prior images.    Assessment/Plan  * GI bleed- (present on admission)  Assessment & Plan  Conservative transfusion strategy  PPI  Completed 6 days abx for sbp prophy  Status post EGD 11/17 with grade 1 esophageal varices    Acute respiratory failure with hypoxia (HCC)- (present on admission)  Assessment & Plan  Intubated 11/17-21  RT/O2 protocols  Aspiration precaution  Completed course of abx 11/22  Start PEP, may require NT suctioning    HERBERTH (acute kidney injury) (HCC)- (present on admission)  Assessment & Plan  Trending towards improvement  Renally dose medications minimize nephrotoxic substances  Monitor urine output    Portal vein thrombosis- (present on admission)  Assessment & Plan  Anti-coagulation contraindicated, and indeed, not typically indicated in patients with cirrhosis unless there is evidence of gut ischemia    Alcoholic hepatitis with ascites- (present on admission)  Assessment & Plan  Discriminant function 44.8  Not currently on prednisone secondary to concern of GI bleed  Serial ammonia levels  Minimize hepatotoxic substances  Recommend complete EtOH cessation  Lactulose/rifaximin     Hypotension- (present on admission)  Assessment & Plan  Off pressors  F/u cultures  Complete course of abx  Continue midodrine  Multifactorial with liver dz, ? Infection, active diureses, poss umana  Improving    Hypernatremia  Assessment & Plan  Continue free water  Trend    Decompensated hepatic cirrhosis (HCC)- (present on admission)  Assessment &  Plan  Reason for decompensation may be portal vein thrombus vs EtOH use.  Was treated for an infection at OSH.  No SBP on paracentesis.      Hypokalemia- (present on admission)  Assessment & Plan  Replace to keep greater than 4    Anemia associated with acute blood loss- (present on admission)  Assessment & Plan  Transfuse <7    Hepatic encephalopathy (HCC)- (present on admission)  Assessment & Plan  Lactulose, rifaximin    Hypothyroidism- (present on admission)  Assessment & Plan  levothyroxine       VTE:  Contraindicated  Ulcer: PPI  Lines: Central Line  Ongoing indication addressed    I have performed a physical exam and reviewed and updated ROS and Plan today (11/23/2019). In review of yesterday's note (11/22/2019), there are no changes except as documented above.     Discussed patient condition and risk of morbidity and/or mortality with Hospitalist, RN, RT and Pharmacy

## 2019-11-23 NOTE — PALLIATIVE CARE
Spiritual Care Note    Patient Information     Patient's Name: Emily Torres   MRN: [id number]    YOB: 1960   Age and Gender: 59 y.o. female   Service Area: [facility]   Room (and Bed): [facility]   Ethnicity or Nationality: white   Primary Language: English   Orthodox/Spiritual Preference: none   Place of Residence: [place]   Code Status: DNAR/DNI    Date of Admission: 11/15/2019   Length of Stay: 7 days        Spiritual Care Provider Information:  Title of Spiritual Care Provider:    Name of Spiritual Care Provider:   SKIP Alba  Phone Number:     [phone]   E-Mail:       [email or web address]  Total time:      10 minutes      Spiritual Screen Results:  Is your spiritual health or inner well-being important to you as you cope with your medical condition?:     No  Would you like to receive a visit from our Spiritual Care team or your own Alevism or spiritual leader?:     No  Was spiritual care education provided to the patient?:     Declined     PC Sprituality screening comment:   order placed      Encounter/Request Information  Visited With:      Patient  Nature of the Visit:     Initial, On shift  Crisis Visit:     Critical care  Referral From/Origin of Request:   [facility] nursing      Spiritual Assessment   Observations/Symptoms:    Patient lying in bed, alert, on oxygen w/ mask.  Interacton/Conversation:    Patient indicated that she wanted me to pray for her.  Assessment:      Need  Need:       Seeking Spiritual Assistance and Support  Interventions:      compassionate presence, emotional support  Outcomes:      Spiritual Comfort  Plan:       Visit Upon Request

## 2019-11-23 NOTE — CARE PLAN
Problem: Communication  Goal: The ability to communicate needs accurately and effectively will improve  Outcome: PROGRESSING AS EXPECTED  Note:   Pt. confused, requiring frequent reorientation.  A/O x2

## 2019-11-24 NOTE — PROGRESS NOTES
Pt family at bedside. Dr. Venkata witt, updated. MD states he will come to bedside to discuss further POC. LR bolus infusing per order.

## 2019-11-24 NOTE — PROGRESS NOTES
MD updated on pt's breathing and mentation and low BP 80's-90's/30's. LR bolus ordered. Scheduled midodrine given. Pt family en route. Will call MD back when family arrives.

## 2019-11-24 NOTE — CARE PLAN
Problem: Skin Integrity  Goal: Skin Integrity is maintained  Outcome: PROGRESSING AS EXPECTED  Note:   Pt. turned q2, pillows utilized for support and positioning.       Problem: Pain  Goal: Alleviation of Pain or a reduction in pain to the patient's comfort goal  Outcome: PROGRESSING AS EXPECTED  Note:   Pt.'s pain controlled with current PRN medication.

## 2019-11-24 NOTE — CARE PLAN
Problem: Hyperinflation:  Goal: Prevent or improve atelectasis  11/23/2019 2313 by Beverly Braun, RRT  Outcome: PROGRESSING AS EXPECTED     Problem: Oxygenation:  Goal: Maintain adequate oxygenation dependent on patient condition  11/23/2019 2313 by Beverly Braun, RRT  Outcome: PROGRESSING AS EXPECTED  \     Problem: Bronchopulmonary Hygiene:  Goal: Increase mobilization of retained secretions  Outcome: PROGRESSING AS EXPECTED     Pt started on PEP QID per MD. NT suctioning as needed. 2L O2 oxymask.

## 2019-11-24 NOTE — PROGRESS NOTES
Pt.'s family discussed comfort care during day shift.  Comfort care orders were placed and are currently held.  Family is going to stay the night and is requesting to re-evaluate in the AM.  Pt. remains lethargic, A/O x1 to self only, and lung sounds dim with coarse crackles.  Will continue to monitor.

## 2019-11-24 NOTE — PROGRESS NOTES
Bedside report given to Murali CLAYTON. POC discussed. Pt resting comfortably in bed. Safety precautions in place. Pt family at bedside.  12 hr chart check.

## 2019-11-24 NOTE — PROGRESS NOTES
Updated MD on pt's decline in condition. Pt more lethargic, very coarse breath sounds even after NT and oral suctioning, pt breathing more labored. MD requested RN call pt's family to inform them of her decline and to decide any changes to POC.    Called pt's family, left VM on both numbers listed. Awaiting call back.

## 2019-11-24 NOTE — PROGRESS NOTES
Hospital Medicine Daily Progress Note    Date of Service  11/24/2019    Chief Complaint  59 y.o. female admitted 11/15/2019 with GI bleed.    Hospital Course    Ms. Torres has a past medical history of hypothyroidism and hypertension as well as alcohol use that was transferred from an outside hospital with decompensated cirrhosis.  She had been found to have portal vein thrombosis treated with a heparin drip developed a GI bleed was transferred here on octreotide.  She required intubation for ARDS on 11/17/2019 and underwent EGD revealing esophagitis and gastric erosions.  On 11/23/2019 the patient's status worsened, decision was made to transition [place]by      Interval Problem Update  Patient became more lethargic yesterday, family was contacted and decision was made to transition to comfort care  Patient opens eyes to her name, does not follow commands  She appears in some discomfort, I discussed IV Ativan and IV morphine based on symptoms with the bedside RN    Consultants/Specialty  Critical Care, I discussed the patient's condition with Dr. Novak this morning on ICU Rounds  Gastroenterology    Code Status  DNR/DNI/Comfort Care    Disposition  medical    Review of Systems  Review of Systems   Unable to perform ROS: Patient nonverbal        Physical Exam  Temp:  [36.8 °C (98.3 °F)-37.1 °C (98.8 °F)] 37 °C (98.6 °F)  Pulse:  [] 86  Resp:  [18-24] 24  BP: ()/(29-66) 82/43  SpO2:  [92 %-98 %] 94 %    Physical Exam  Vitals signs and nursing note reviewed.   Constitutional:       General: She is not in acute distress.     Appearance: She is ill-appearing and diaphoretic.   HENT:      Head: Normocephalic and atraumatic.      Nose:      Comments: cortrak nares     Mouth/Throat:      Mouth: Mucous membranes are dry.      Pharynx: Oropharynx is clear.   Eyes:      General: Scleral icterus present.      Conjunctiva/sclera: Conjunctivae normal.   Neck:      Comments: Right IJ central line  Cardiovascular:      Rate  and Rhythm: Regular rhythm. Tachycardia present.      Heart sounds: No murmur.   Pulmonary:      Effort: Pulmonary effort is normal. No respiratory distress.      Breath sounds: Normal breath sounds.   Abdominal:      General: There is distension.      Tenderness: There is no tenderness.   Musculoskeletal:         General: Swelling present. No tenderness.      Comments: Left arm midline catheter   Skin:     Coloration: Skin is jaundiced.      Comments: Bruises upper chest and spider angiomata   Neurological:      Comments: Awake, non verbal         Fluids    Intake/Output Summary (Last 24 hours) at 11/24/2019 0746  Last data filed at 11/24/2019 0600  Gross per 24 hour   Intake 3410 ml   Output 1650 ml   Net 1760 ml       Laboratory  Recent Labs     11/22/19  0500 11/23/19  0416   WBC 16.2* 18.1*   RBC 2.46* 2.37*   HEMOGLOBIN 8.2* 8.0*   HEMATOCRIT 27.3* 27.2*   .0* 114.8*   MCH 33.3* 33.8*   MCHC 30.0* 29.4*   .3* 116.8*   PLATELETCT 110* 132*   MPV 11.3 11.1     Recent Labs     11/22/19  0500 11/22/19  1549 11/23/19  0416   SODIUM 156* 155* 154*   POTASSIUM 3.4* 4.0 3.7   CHLORIDE 116* 115* 115*   CO2 34* 36* 34*   GLUCOSE 119* 129* 118*   BUN 28* 26* 27*   CREATININE 0.69 0.74 0.69   CALCIUM 9.0 8.9 8.8                   Imaging  IR-MIDLINE CATHETER INSERTION WO GUIDANCE > AGE 3   Final Result                  Ultrasound-guided midline placement performed by qualified nursing staff    as above.          DX-CHEST-PORTABLE (1 VIEW)   Final Result         1.  Pulmonary edema and/or infiltrates are identified, which appear somewhat increased since the prior exam.      DX-CHEST-PORTABLE (1 VIEW)   Final Result         1.  Pulmonary edema and/or infiltrates are identified, which are stable since the prior exam.      DX-CHEST-PORTABLE (1 VIEW)   Final Result         1.  Pulmonary edema and/or infiltrates are identified, which are somewhat decreased since the prior exam.         DX-ABDOMEN FOR TUBE  PLACEMENT   Final Result      Cortrak feeding tube tip projects in the region of the mid stomach.      DX-CHEST-PORTABLE (1 VIEW)   Final Result         1.  Pulmonary edema and/or infiltrates are identified, which are stable since the prior exam.      DX-CHEST-PORTABLE (1 VIEW)   Final Result         1.  Pulmonary edema and/or infiltrates are identified, which are stable since the prior exam.      DX-CHEST-PORTABLE (1 VIEW)   Final Result      1.  Endotracheal tube appears appropriately located      2.  Unchanged pulmonary edema      DX-CHEST-PORTABLE (1 VIEW)   Final Result      Development of moderate pulmonary edema      US-ABDOMEN COMPLETE SURVEY   Final Result      1.  There is ascites in the abdomen and pelvis.      2.  Sludge is noted in the gallbladder but no gallstones are identified.      3.  Small left pleural effusion is identified.      4.  Borderline splenomegaly.      5.  Mild dilatation of common bile duct. No intrahepatic ductal dilatation is appreciated.         US-PARACENTESIS, ABD WITH IMAGING   Final Result      1. Ultrasound-guided diagnostic paracentesis of the right lower quadrant of the abdominal wall.      2. 50 mL of fluid withdrawn.      DX-CHEST-PORTABLE (1 VIEW)   Final Result      1.  Left lower lobe atelectasis or pneumonia.      2.  No right lung consolidation.      3.  Right IJV central line in place.           Assessment/Plan  * Alcoholic hepatitis with ascites- (present on admission)  Assessment & Plan  Suspect underlying cirrhosis   MELD 19 -> 24 with a 3 month mortality of 19.6  MELD ETOH Hepatitis 19 -> 24 with a 3 month mortality of 39%    Comfort care  She is expected to  within hours to days  IV morphine and ativan as needed for signs of discomfort are dyspnea    ARDS (adult respiratory distress syndrome) (HCC)- (present on admission)  Assessment & Plan  Comfort care    Acute respiratory failure with hypoxia (HCC)- (present on admission)  Assessment &  Plan  Severe, required intubation  Now comfort care      GI bleed- (present on admission)  Assessment & Plan        HERBERTH (acute kidney injury) (HCC)- (present on admission)  Assessment & Plan  Resolved    Portal vein thrombosis- (present on admission)  Assessment & Plan  Anticoagulation contraindicated with acute GI bleeding    DNR (do not resuscitate)  Assessment & Plan  DNR/DNI  Now comfort care    Hypotension- (present on admission)  Assessment & Plan  Consistent with hypovolemic shock requiring intravenous pressors    Decompensated hepatic cirrhosis (HCC)- (present on admission)  Assessment & Plan  Consistent with alcoholic cirrhosis  Gastroenterology has been consulted  Bilirubin is 8    Anemia associated with acute blood loss- (present on admission)  Assessment & Plan  Secondary to acute upper GI bleeding    Hepatic encephalopathy (HCC)- (present on admission)  Assessment & Plan  Comfort care        VTE prophylaxis: SCDs

## 2019-11-25 NOTE — PROGRESS NOTES
Spiritual Care Note    Spiritual Care Provider Information:  Name of Spiritual Care Provider: Sima Sun   Title of Spiritual Care Provider: Associate Chandra  Phone Number: [phone]  E-mail: [email or web address]  Total time : 45 minutes    Spiritual Screen Results:    Gen Nursing  Spiritual Screen  Is your spiritual health or inner well-being important to you as you cope with your medical condition?: No  Would you like to receive a visit from our Spiritual Care team or your own Congregation or spiritual leader?: No  Was spiritual care education provided to the patient?: Declined     Palliative Care  PC Worship/Spiritual Screening  Is your spiritual health or inner well-being important to you as you cope with your medical condition?: Yes  Would you like to receive a visit from our Spiritual Care team or your own Congregation or spiritual leader?: Yes  Was spiritual care education provided to the patient?: Declined  PC Sprituality screening comment: order placed      Encounter/Request Information  Encounter/Request Type     Visited With: Patient and family together    Nature of the Visit: Follow-up, On shift    Continue Visiting: (UPON REQUEST)    Crisis Visit: Patient actively dying/EOL    Referral From/ Origin of Request: [place] management rounds    Religous Needs/Values  Worship Needs Visit  Worship Needs: Prayer    Spiritual Assessment   Spiritual Care Encounters    Observations/Symptoms: Anxious, Sadness, Tearful(pt unresponsive, mother/father bedside)    Interacton/Conversation:  introduced self to pt's family at BS. Mother of pt very teary. Both mother/father very compassionate presence for daughter. Family shared stories of pt - parents having diffulty in understanding pt's critical health situation - parents did not know how heavily pt was drinking alcohol. Family desiring prayer for pt.  able to locate on phone a passage from a book her daughter was reading - mother had  forgotten book at home. Family grateful for  presence.    Assessment: Distress, Need  Need: Seeking Spiritual Assistance and Support  Distress: Doubt, Disbelief, Conflicted or Challenged Beliefs, Grief/Loss/Bereavement, Upsetting Diagnosis/Prognosis(mother struggling with hoping for God remembering her daught)    Interventions: Compassionate Presence, Reflective Listening, Therapeutic Touch, Prayer    Outcomes: Ability to Communicate with Truth and Honesty, Acceptance, Connectedness with the Holy/with God, Emotional  Release, Love/Belonging/ Compassion/Kindness/Acceptance, Spiritual Comfort, Value/Dignity/Respect, Progress with Grief    Plan: Visit Upon Request    Notes:

## 2019-11-25 NOTE — DIETARY
Brief Nutrition Services Note: TF canceled on 11/24, pt now comfort care. Please consult RD if pt's status changes. RD available PRN.

## 2019-11-25 NOTE — DISCHARGE PLANNING
· This RNCM met with the patient and her family at the bedside   · Difficult times packet provided   · Pt's parents requesting a  to say a prayer  · This RNCM called Chaplain Adan and requested his service   · [facility] selected: [facility], [phone]

## 2019-11-25 NOTE — THERAPY
Occupational Therapy Contact Note  OT eval not completed; pt has transitioned to comfort care and per RN, family/pt doesn't want OT services. Will complete order, but please reconsult if POC changes.  Shoshana WILSON, OTR/L    Pager #[phone]

## 2019-11-26 NOTE — DISCHARGE PLANNING
Medical Social Work    Referral:     Intervention: MSW received a call from nursing staff that pt  and family at bedside is requesting a .  MSW contacted on-call Loco who will respond to bedside as soon as he's able.    Plan: Nothing further at this time.

## 2019-11-26 NOTE — PROGRESS NOTES
Report received from FORREST Huston. Parents at bedside. Pt unresponsive. Pain medication administered for comfort.

## 2019-11-26 NOTE — PROGRESS NOTES
Hospital Medicine Daily Progress Note    Date of Service  11/25/2019    Chief Complaint  59 y.o. female admitted 11/15/2019 with GI bleed.    Hospital Course    Ms. Torres has a past medical history of hypothyroidism and hypertension as well as alcohol use that was transferred from an outside hospital with decompensated cirrhosis.  She had been found to have portal vein thrombosis treated with a heparin drip developed a GI bleed was transferred here on octreotide.  She required intubation for ARDS on 11/17/2019 and underwent EGD revealing esophagitis and gastric erosions.  On 11/23/2019 the patient's status worsened, decision was made to transition to comfort care.      Interval Problem Update  Patient appears to be sleeping, family at the bedside  No signs of distress or dyspnea  Unresponsive thus unable to give interval history    Consultants/Specialty  Critical Care, signed off  Gastroenterology, signed off    Code Status  DNR/DNI/Comfort Care    Disposition  medical    Review of Systems  Review of Systems   Unable to perform ROS: Patient nonverbal        Physical Exam  Pulse:  [91] 91  BP: (60)/(33) 60/33  SpO2:  [63 %] 63 %    Physical Exam  Constitutional:       General: She is not in acute distress.     Appearance: She is ill-appearing.   HENT:      Head: Normocephalic and atraumatic.      Right Ear: External ear normal.      Left Ear: External ear normal.      Nose: Nose normal.      Mouth/Throat:      Mouth: Mucous membranes are moist.      Pharynx: Oropharynx is clear.   Eyes:      General: Scleral icterus present.      Extraocular Movements: Extraocular movements intact.      Conjunctiva/sclera: Conjunctivae normal.      Pupils: Pupils are equal, round, and reactive to light.   Neck:      Musculoskeletal: Normal range of motion and neck supple.   Cardiovascular:      Rate and Rhythm: Normal rate and regular rhythm.      Pulses: Normal pulses.   Pulmonary:      Effort: Pulmonary effort is normal.       Breath sounds: Rales present.   Abdominal:      General: Abdomen is flat. Bowel sounds are normal.      Palpations: Abdomen is soft.   Musculoskeletal: Normal range of motion.   Skin:     General: Skin is warm and dry.      Capillary Refill: Capillary refill takes less than 2 seconds.      Coloration: Skin is jaundiced.   Neurological:      Cranial Nerves: No cranial nerve deficit.      Gait: Gait normal.      Comments: Appears to be sleeping, unresponsive   Psychiatric:         Mood and Affect: Mood normal.         Behavior: Behavior normal.         Fluids  No intake or output data in the 24 hours ending 11/25/19 1702    Laboratory  Recent Labs     11/23/19  0416   WBC 18.1*   RBC 2.37*   HEMOGLOBIN 8.0*   HEMATOCRIT 27.2*   .8*   MCH 33.8*   MCHC 29.4*   .8*   PLATELETCT 132*   MPV 11.1     Recent Labs     11/23/19  0416   SODIUM 154*   POTASSIUM 3.7   CHLORIDE 115*   CO2 34*   GLUCOSE 118*   BUN 27*   CREATININE 0.69   CALCIUM 8.8                   Imaging  IR-MIDLINE CATHETER INSERTION WO GUIDANCE > AGE 3   Final Result                  Ultrasound-guided midline placement performed by qualified nursing staff    as above.          DX-CHEST-PORTABLE (1 VIEW)   Final Result         1.  Pulmonary edema and/or infiltrates are identified, which appear somewhat increased since the prior exam.      DX-CHEST-PORTABLE (1 VIEW)   Final Result         1.  Pulmonary edema and/or infiltrates are identified, which are stable since the prior exam.      DX-CHEST-PORTABLE (1 VIEW)   Final Result         1.  Pulmonary edema and/or infiltrates are identified, which are somewhat decreased since the prior exam.         DX-ABDOMEN FOR TUBE PLACEMENT   Final Result      Cortrak feeding tube tip projects in the region of the mid stomach.      DX-CHEST-PORTABLE (1 VIEW)   Final Result         1.  Pulmonary edema and/or infiltrates are identified, which are stable since the prior exam.      DX-CHEST-PORTABLE (1 VIEW)   Final  Result         1.  Pulmonary edema and/or infiltrates are identified, which are stable since the prior exam.      DX-CHEST-PORTABLE (1 VIEW)   Final Result      1.  Endotracheal tube appears appropriately located      2.  Unchanged pulmonary edema      DX-CHEST-PORTABLE (1 VIEW)   Final Result      Development of moderate pulmonary edema      US-ABDOMEN COMPLETE SURVEY   Final Result      1.  There is ascites in the abdomen and pelvis.      2.  Sludge is noted in the gallbladder but no gallstones are identified.      3.  Small left pleural effusion is identified.      4.  Borderline splenomegaly.      5.  Mild dilatation of common bile duct. No intrahepatic ductal dilatation is appreciated.         US-PARACENTESIS, ABD WITH IMAGING   Final Result      1. Ultrasound-guided diagnostic paracentesis of the right lower quadrant of the abdominal wall.      2. 50 mL of fluid withdrawn.      DX-CHEST-PORTABLE (1 VIEW)   Final Result      1.  Left lower lobe atelectasis or pneumonia.      2.  No right lung consolidation.      3.  Right IJV central line in place.           Assessment/Plan  * Alcoholic hepatitis with ascites- (present on admission)  Assessment & Plan  Suspect underlying cirrhosis   MELD 19 -> 24 with a 3 month mortality of 19.6  MELD ETOH Hepatitis 19 -> 24 with a 3 month mortality of 39%    Comfort care  She is expected to  within hours to days  Discussed symptom control with bedside RN, patient appears comfortable at this time, continue IV morphine and Ativan as needed    ARDS (adult respiratory distress syndrome) (HCC)- (present on admission)  Assessment & Plan  Comfort care    Acute respiratory failure with hypoxia (HCC)- (present on admission)  Assessment & Plan  Severe, required intubation  Now comfort care      GI bleed- (present on admission)  Assessment & Plan        HERBERTH (acute kidney injury) (HCC)- (present on admission)  Assessment & Plan  Resolved    DNR (do not  resuscitate)  Assessment & Plan  DNR/DNI  Now comfort care    Hypotension- (present on admission)  Assessment & Plan  Consistent with hypovolemic shock requiring intravenous pressors    Decompensated hepatic cirrhosis (HCC)- (present on admission)  Assessment & Plan  Consistent with alcoholic cirrhosis    Anemia associated with acute blood loss- (present on admission)  Assessment & Plan  Secondary to acute upper GI bleeding    Hepatic encephalopathy (HCC)- (present on admission)  Assessment & Plan  Comfort care        VTE prophylaxis: SCDs

## 2019-11-26 NOTE — DISCHARGE SUMMARY
Death Summary    Cause of Death  Alcoholic cirrhosis    Comorbid Conditions at the Time of Death  Principal Problem:    Alcoholic hepatitis with ascites POA: Yes  Active Problems:    GI bleed POA: Yes    Acute respiratory failure with hypoxia (HCC) POA: Yes    ARDS (adult respiratory distress syndrome) (HCC) POA: Yes    Rigors POA: Unknown    Portal vein thrombosis POA: Yes    HERBERTH (acute kidney injury) (HCC) POA: Yes    Decompensated hepatic cirrhosis (HCC) POA: Yes    Hypernatremia POA: Unknown    Hypotension POA: Yes    DNR (do not resuscitate) POA: Unknown    Hypothyroidism POA: Yes      Overview: TSH 3.82 - repeat in Sept    Hepatic encephalopathy (HCC) POA: Yes    Anemia associated with acute blood loss POA: Yes    Hypokalemia POA: Yes    Leukocytosis POA: Yes  Resolved Problems:    * No resolved hospital problems. *      History of Presenting Illness and Hospital Course  This is a 59 y.o. female admitted 11/15/2019 with GI bleeding.    Patient with past medical history of hypothyroidism and hypertension as well as alcohol use that was transferred from an outside hospital with decompensated cirrhosis.  She had been found to have portal vein thrombosis treated with a heparin drip developed a GI bleed was transferred here on octreotide.  She required intubation for ARDS on 2019 and underwent EGD revealing varices, esophagitis and gastric erosions.  The patient self extubated on 2019.  She was too confused and lethargic to discuss goals of care, her family elected for DNR status given her poor prognosis.  Patient did not improve and was transitioned to comfort care on 2019.  She was provided with medications to treat signs of discomfort and dyspnea, she  peacefully.    Death Date: 19   Death Time: 535         Pronounced By (RN1): Astrid Mosher  Pronounced By (RN2): Chery Lion
